# Patient Record
Sex: MALE | Race: WHITE | NOT HISPANIC OR LATINO | Employment: OTHER | URBAN - METROPOLITAN AREA
[De-identification: names, ages, dates, MRNs, and addresses within clinical notes are randomized per-mention and may not be internally consistent; named-entity substitution may affect disease eponyms.]

---

## 2017-08-11 RX ORDER — ACETAMINOPHEN 500 MG
500-1000 TABLET ORAL EVERY 6 HOURS PRN
COMMUNITY
End: 2021-01-15

## 2017-08-11 RX ORDER — MV-MINS/FOLIC/LYCOPENE/GINKGO 400-300MCG
1 TABLET ORAL DAILY
COMMUNITY
End: 2021-01-15

## 2017-08-22 ASSESSMENT — PAIN SCALES - GENERAL: PAINLEVEL_OUTOF10: ASSUMED PAIN PRESENT

## 2017-08-23 ENCOUNTER — HOSPITAL ENCOUNTER (INPATIENT)
Facility: MEDICAL CENTER | Age: 52
LOS: 2 days | DRG: 657 | End: 2017-08-25
Attending: UROLOGY | Admitting: UROLOGY
Payer: MEDICAID

## 2017-08-23 PROBLEM — C64.9 RENAL CARCINOMA (HCC): Status: ACTIVE | Noted: 2017-08-23

## 2017-08-23 LAB
ABO GROUP BLD: NORMAL
ABO GROUP BLD: NORMAL
BLD GP AB SCN SERPL QL: NORMAL
RH BLD: NORMAL

## 2017-08-23 PROCEDURE — 86850 RBC ANTIBODY SCREEN: CPT

## 2017-08-23 PROCEDURE — 160002 HCHG RECOVERY MINUTES (STAT): Performed by: UROLOGY

## 2017-08-23 PROCEDURE — 160036 HCHG PACU - EA ADDL 30 MINS PHASE I: Performed by: UROLOGY

## 2017-08-23 PROCEDURE — A9270 NON-COVERED ITEM OR SERVICE: HCPCS | Performed by: UROLOGY

## 2017-08-23 PROCEDURE — 160029 HCHG SURGERY MINUTES - 1ST 30 MINS LEVEL 4: Performed by: UROLOGY

## 2017-08-23 PROCEDURE — 88307 TISSUE EXAM BY PATHOLOGIST: CPT | Mod: 59

## 2017-08-23 PROCEDURE — 700102 HCHG RX REV CODE 250 W/ 637 OVERRIDE(OP): Performed by: UROLOGY

## 2017-08-23 PROCEDURE — A9270 NON-COVERED ITEM OR SERVICE: HCPCS

## 2017-08-23 PROCEDURE — 700102 HCHG RX REV CODE 250 W/ 637 OVERRIDE(OP)

## 2017-08-23 PROCEDURE — 700101 HCHG RX REV CODE 250: Performed by: UROLOGY

## 2017-08-23 PROCEDURE — 160009 HCHG ANES TIME/MIN: Performed by: UROLOGY

## 2017-08-23 PROCEDURE — 700111 HCHG RX REV CODE 636 W/ 250 OVERRIDE (IP)

## 2017-08-23 PROCEDURE — 700101 HCHG RX REV CODE 250

## 2017-08-23 PROCEDURE — 0TT00ZZ RESECTION OF RIGHT KIDNEY, OPEN APPROACH: ICD-10-PCS | Performed by: UROLOGY

## 2017-08-23 PROCEDURE — 160035 HCHG PACU - 1ST 60 MINS PHASE I: Performed by: UROLOGY

## 2017-08-23 PROCEDURE — 06C00ZZ EXTIRPATION OF MATTER FROM INFERIOR VENA CAVA, OPEN APPROACH: ICD-10-PCS | Performed by: UROLOGY

## 2017-08-23 PROCEDURE — 501838 HCHG SUTURE GENERAL: Performed by: UROLOGY

## 2017-08-23 PROCEDURE — 500698 HCHG HEMOCLIP, MEDIUM: Performed by: UROLOGY

## 2017-08-23 PROCEDURE — 0TB60ZZ EXCISION OF RIGHT URETER, OPEN APPROACH: ICD-10-PCS | Performed by: UROLOGY

## 2017-08-23 PROCEDURE — 700105 HCHG RX REV CODE 258: Performed by: UROLOGY

## 2017-08-23 PROCEDURE — 770006 HCHG ROOM/CARE - MED/SURG/GYN SEMI*

## 2017-08-23 PROCEDURE — 501498 HCHG SURG-I-LOOP, MAXI (BLUE): Performed by: UROLOGY

## 2017-08-23 PROCEDURE — 500697 HCHG HEMOCLIP, LARGE (ORANGE): Performed by: UROLOGY

## 2017-08-23 PROCEDURE — 0GB30ZZ EXCISION OF RIGHT ADRENAL GLAND, OPEN APPROACH: ICD-10-PCS | Performed by: UROLOGY

## 2017-08-23 PROCEDURE — 160041 HCHG SURGERY MINUTES - EA ADDL 1 MIN LEVEL 4: Performed by: UROLOGY

## 2017-08-23 PROCEDURE — 86901 BLOOD TYPING SEROLOGIC RH(D): CPT

## 2017-08-23 PROCEDURE — 700111 HCHG RX REV CODE 636 W/ 250 OVERRIDE (IP): Performed by: UROLOGY

## 2017-08-23 PROCEDURE — 07BD0ZZ EXCISION OF AORTIC LYMPHATIC, OPEN APPROACH: ICD-10-PCS | Performed by: UROLOGY

## 2017-08-23 PROCEDURE — 160048 HCHG OR STATISTICAL LEVEL 1-5: Performed by: UROLOGY

## 2017-08-23 PROCEDURE — 500122 HCHG BOVIE, BLADE: Performed by: UROLOGY

## 2017-08-23 PROCEDURE — A6402 STERILE GAUZE <= 16 SQ IN: HCPCS | Performed by: UROLOGY

## 2017-08-23 PROCEDURE — 502647 HCHG SEALANT-FIBRIN EVICEL 5 ML: Performed by: UROLOGY

## 2017-08-23 PROCEDURE — 501561 HCHG TRAY, EPIDURAL SINGLE SHOT: Performed by: UROLOGY

## 2017-08-23 PROCEDURE — 86900 BLOOD TYPING SEROLOGIC ABO: CPT

## 2017-08-23 PROCEDURE — 502704 HCHG DEVICE, LIGASURE IMPACT: Performed by: UROLOGY

## 2017-08-23 PROCEDURE — 501445 HCHG STAPLER, SKIN DISP: Performed by: UROLOGY

## 2017-08-23 RX ORDER — SCOLOPAMINE TRANSDERMAL SYSTEM 1 MG/1
1 PATCH, EXTENDED RELEASE TRANSDERMAL
Status: DISCONTINUED | OUTPATIENT
Start: 2017-08-23 | End: 2017-08-25 | Stop reason: HOSPADM

## 2017-08-23 RX ORDER — SODIUM CHLORIDE, SODIUM LACTATE, POTASSIUM CHLORIDE, CALCIUM CHLORIDE 600; 310; 30; 20 MG/100ML; MG/100ML; MG/100ML; MG/100ML
INJECTION, SOLUTION INTRAVENOUS CONTINUOUS
Status: DISCONTINUED | OUTPATIENT
Start: 2017-08-23 | End: 2017-08-25 | Stop reason: HOSPADM

## 2017-08-23 RX ORDER — DIPHENHYDRAMINE HYDROCHLORIDE 50 MG/ML
25 INJECTION INTRAMUSCULAR; INTRAVENOUS EVERY 6 HOURS PRN
Status: DISCONTINUED | OUTPATIENT
Start: 2017-08-23 | End: 2017-08-23

## 2017-08-23 RX ORDER — LIDOCAINE AND PRILOCAINE 25; 25 MG/G; MG/G
1 CREAM TOPICAL
Status: COMPLETED | OUTPATIENT
Start: 2017-08-23 | End: 2017-08-23

## 2017-08-23 RX ORDER — LIDOCAINE HYDROCHLORIDE 10 MG/ML
INJECTION, SOLUTION INFILTRATION; PERINEURAL
Status: COMPLETED
Start: 2017-08-23 | End: 2017-08-23

## 2017-08-23 RX ORDER — DEXTROSE MONOHYDRATE, SODIUM CHLORIDE, AND POTASSIUM CHLORIDE 50; 1.49; 4.5 G/1000ML; G/1000ML; G/1000ML
INJECTION, SOLUTION INTRAVENOUS CONTINUOUS
Status: DISCONTINUED | OUTPATIENT
Start: 2017-08-23 | End: 2017-08-25 | Stop reason: HOSPADM

## 2017-08-23 RX ORDER — DIPHENHYDRAMINE HCL 25 MG
25 TABLET ORAL NIGHTLY PRN
Status: DISCONTINUED | OUTPATIENT
Start: 2017-08-23 | End: 2017-08-25 | Stop reason: HOSPADM

## 2017-08-23 RX ORDER — NALOXONE HYDROCHLORIDE 0.4 MG/ML
0.1 INJECTION, SOLUTION INTRAMUSCULAR; INTRAVENOUS; SUBCUTANEOUS
Status: DISCONTINUED | OUTPATIENT
Start: 2017-08-23 | End: 2017-08-25 | Stop reason: HOSPADM

## 2017-08-23 RX ORDER — LIDOCAINE HYDROCHLORIDE 10 MG/ML
0.5 INJECTION, SOLUTION INFILTRATION; PERINEURAL
Status: COMPLETED | OUTPATIENT
Start: 2017-08-23 | End: 2017-08-23

## 2017-08-23 RX ORDER — ACETAMINOPHEN 500 MG
TABLET ORAL
Status: DISPENSED
Start: 2017-08-23 | End: 2017-08-23

## 2017-08-23 RX ORDER — FAMOTIDINE 20 MG/1
20 TABLET, FILM COATED ORAL EVERY 12 HOURS
Status: DISCONTINUED | OUTPATIENT
Start: 2017-08-23 | End: 2017-08-25 | Stop reason: HOSPADM

## 2017-08-23 RX ORDER — DEXAMETHASONE SODIUM PHOSPHATE 4 MG/ML
4 INJECTION, SOLUTION INTRA-ARTICULAR; INTRALESIONAL; INTRAMUSCULAR; INTRAVENOUS; SOFT TISSUE
Status: DISCONTINUED | OUTPATIENT
Start: 2017-08-23 | End: 2017-08-25 | Stop reason: HOSPADM

## 2017-08-23 RX ORDER — ONDANSETRON 2 MG/ML
4 INJECTION INTRAMUSCULAR; INTRAVENOUS EVERY 4 HOURS PRN
Status: DISCONTINUED | OUTPATIENT
Start: 2017-08-23 | End: 2017-08-23

## 2017-08-23 RX ORDER — ONDANSETRON 2 MG/ML
4 INJECTION INTRAMUSCULAR; INTRAVENOUS EVERY 6 HOURS PRN
Status: DISCONTINUED | OUTPATIENT
Start: 2017-08-23 | End: 2017-08-25 | Stop reason: HOSPADM

## 2017-08-23 RX ORDER — HALOPERIDOL 5 MG/ML
1 INJECTION INTRAMUSCULAR EVERY 6 HOURS PRN
Status: DISCONTINUED | OUTPATIENT
Start: 2017-08-23 | End: 2017-08-25 | Stop reason: HOSPADM

## 2017-08-23 RX ORDER — OXYCODONE HCL 10 MG/1
TABLET, FILM COATED, EXTENDED RELEASE ORAL
Status: DISPENSED
Start: 2017-08-23 | End: 2017-08-23

## 2017-08-23 RX ORDER — GABAPENTIN 300 MG/1
CAPSULE ORAL
Status: DISPENSED
Start: 2017-08-23 | End: 2017-08-23

## 2017-08-23 RX ORDER — AMOXICILLIN 250 MG
2 CAPSULE ORAL
Status: DISCONTINUED | OUTPATIENT
Start: 2017-08-23 | End: 2017-08-25 | Stop reason: HOSPADM

## 2017-08-23 RX ADMIN — POTASSIUM CHLORIDE, DEXTROSE MONOHYDRATE AND SODIUM CHLORIDE: 150; 5; 450 INJECTION, SOLUTION INTRAVENOUS at 17:10

## 2017-08-23 RX ADMIN — CEFAZOLIN SODIUM 1 G: 1 INJECTION, SOLUTION INTRAVENOUS at 21:32

## 2017-08-23 RX ADMIN — CEFAZOLIN SODIUM 1 G: 1 INJECTION, SOLUTION INTRAVENOUS at 17:41

## 2017-08-23 RX ADMIN — FAMOTIDINE 20 MG: 20 TABLET, FILM COATED ORAL at 21:32

## 2017-08-23 RX ADMIN — LIDOCAINE HYDROCHLORIDE 0.5 ML: 10 INJECTION, SOLUTION INFILTRATION; PERINEURAL at 06:30

## 2017-08-23 RX ADMIN — SODIUM CHLORIDE, SODIUM LACTATE, POTASSIUM CHLORIDE, CALCIUM CHLORIDE: 600; 310; 30; 20 INJECTION, SOLUTION INTRAVENOUS at 06:30

## 2017-08-23 ASSESSMENT — LIFESTYLE VARIABLES
HAVE PEOPLE ANNOYED YOU BY CRITICIZING YOUR DRINKING: NO
EVER_SMOKED: YES
ON A TYPICAL DAY WHEN YOU DRINK ALCOHOL HOW MANY DRINKS DO YOU HAVE: 2
EVER HAD A DRINK FIRST THING IN THE MORNING TO STEADY YOUR NERVES TO GET RID OF A HANGOVER: NO
HOW MANY TIMES IN THE PAST YEAR HAVE YOU HAD 5 OR MORE DRINKS IN A DAY: 2
TOTAL SCORE: 1
TOTAL SCORE: 1
ALCOHOL_USE: YES
EVER FELT BAD OR GUILTY ABOUT YOUR DRINKING: NO
HAVE YOU EVER FELT YOU SHOULD CUT DOWN ON YOUR DRINKING: YES
CONSUMPTION TOTAL: POSITIVE
AVERAGE NUMBER OF DAYS PER WEEK YOU HAVE A DRINK CONTAINING ALCOHOL: 2
TOTAL SCORE: 1

## 2017-08-23 ASSESSMENT — PATIENT HEALTH QUESTIONNAIRE - PHQ9
SUM OF ALL RESPONSES TO PHQ9 QUESTIONS 1 AND 2: 0
SUM OF ALL RESPONSES TO PHQ QUESTIONS 1-9: 0
2. FEELING DOWN, DEPRESSED, IRRITABLE, OR HOPELESS: NOT AT ALL
1. LITTLE INTEREST OR PLEASURE IN DOING THINGS: NOT AT ALL

## 2017-08-23 ASSESSMENT — PAIN SCALES - GENERAL
PAINLEVEL_OUTOF10: 2
PAINLEVEL_OUTOF10: 0
PAINLEVEL_OUTOF10: 2
PAINLEVEL_OUTOF10: 0
PAINLEVEL_OUTOF10: 2
PAINLEVEL_OUTOF10: 3
PAINLEVEL_OUTOF10: 0
PAINLEVEL_OUTOF10: 3
PAINLEVEL_OUTOF10: 3
PAINLEVEL_OUTOF10: 2
PAINLEVEL_OUTOF10: 0
PAINLEVEL_OUTOF10: 2
PAINLEVEL_OUTOF10: 0
PAINLEVEL_OUTOF10: 2
PAINLEVEL_OUTOF10: 2
PAINLEVEL_OUTOF10: 4
PAINLEVEL_OUTOF10: 0
PAINLEVEL_OUTOF10: 3
PAINLEVEL_OUTOF10: 0
PAINLEVEL_OUTOF10: 2
PAINLEVEL_OUTOF10: 3
PAINLEVEL_OUTOF10: 0
PAINLEVEL_OUTOF10: 2

## 2017-08-23 NOTE — OR SURGEON
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    Anesthesiologist/Type of Anesthesia:  Anesthesiologist: Alexandre Luna D.O./General    Surgical Staff:  Circulator: Elidia Child R.N.  Relief Circulator: Chung Lemons R.N.  Scrub Person: Bradford Clarke    Specimens:  Right kidney with surrounding lymph nodes    Estimated Blood Loss: Less than 100 mL    Findings: No gross adenopathy    Complications: None        8/23/2017 10:52 AM Héctor Mims

## 2017-08-23 NOTE — IP AVS SNAPSHOT
8/25/2017    Zachery Heath  [address]  [place] [address]    Dear Zachery:    [facility] wants to ensure your discharge home is safe and you or your loved ones have had all of your questions answered regarding your care after you leave the hospital.    Below is a list of resources and contact information should you have any questions regarding your hospital stay, follow-up instructions, or active medical symptoms.    Questions or Concerns Regarding… Contact   Medical Questions Related to Your Discharge  (7 days a week, 8am-5pm) Contact a Nurse Care Coordinator   [phone]   Medical Questions Not Related to Your Discharge  (24 hours a day / 7 days a week)  Contact the Nurse Health Line   [phone]    Medications or Discharge Instructions Refer to your discharge packet   or contact your [facility] Primary Care Provider   [phone]   Follow-up Appointment(s) Schedule your appointment via [employer]   or contact Scheduling [phone]   Billing Review your statement via [employer]  or contact Billing [phone]   Medical Records Review your records via [employer]   or contact Medical Records [phone]     You may receive a telephone call within two days of discharge. This call is to make certain you understand your discharge instructions and have the opportunity to have any questions answered. You can also easily access your medical information, test results and upcoming appointments via the [employer] free online health management tool. You can learn more and sign up at [employer]. For assistance setting up your [employer] account, please call [phone].    Once again, we want to ensure your discharge home is safe and that you have a clear understanding of any next steps in your care. If you have any questions or concerns, please do not hesitate to contact us, we are here for you. Thank you for choosing [facility] for your healthcare needs.    Sincerely,    Your [facility] Healthcare Team

## 2017-08-23 NOTE — IP AVS SNAPSHOT
Hallspot Access Code: Activation code not generated  Current Hallspot Status: Patient Declined    Your email address is not on file at [facility].  Email Addresses are required for you to sign up for Hallspot, please contact [phone] to verify your personal information and to provide your email address prior to attempting to register for Hallspot.    Zachery Heath  [address]  [place] [address]    path intelligencet  A secure, online tool to manage your health information     [facility]’s Hallspot® is a secure, online tool that connects you to your personalized health information from the privacy of your home -- day or night - making it very easy for you to manage your healthcare. Once the activation process is completed, you can even access your medical information using the Hallspot machelle, which is available for free in the Apple Machelle store or Google Play store.     To learn more about Hallspot, visit [email or web address]    There are two levels of access available (as shown below):   My Chart Features  [facility] Primary Care Doctor [facility]  Specialists [facility]  Urgent  Care Non-[facility] Primary Care Doctor   Email your healthcare team securely and privately 24/7 X X X    Manage appointments: schedule your next appointment; view details of past/upcoming appointments X      Request prescription refills. X      View recent personal medical records, including lab and immunizations X X X X   View health record, including health history, allergies, medications X X X X   Read reports about your outpatient visits, procedures, consult and ER notes X X X X   See your discharge summary, which is a recap of your hospital and/or ER visit that includes your diagnosis, lab results, and care plan X X  X     How to register for path intelligencet:  Once your e-mail address has been verified, follow the following steps to sign up for path intelligencet.     1. Go to  [email or web address]  2. Click on the Sign Up Now box, which takes you to the New  Member Sign Up page. You will need to provide the following information:  a. Enter your [employer] Access Code exactly as it appears at the top of this page. (You will not need to use this code after you’ve completed the sign-up process. If you do not sign up before the expiration date, you must request a new code.)   b. Enter your date of birth.   c. Enter your home email address.   d. Click Submit, and follow the next screen’s instructions.  3. Create a [email or web address] ID. This will be your DefenCall login ID and cannot be changed, so think of one that is secure and easy to remember.  4. Create a DefenCall password. You can change your password at any time.  5. Enter your Password Reset Question and Answer. This can be used at a later time if you forget your password.   6. Enter your e-mail address. This allows you to receive e-mail notifications when new information is available in DefenCall.  7. Click Sign Up. You can now view your health information.    For assistance activating your [employer] account, call [phone]

## 2017-08-23 NOTE — OR NURSING
Pt A&O. Resting comfortably in gurney, eating ice chips. Pt reports mild pain that is tolerable with epidural infusion. Good strength in all extremities, however pt reports numbness from T4-L5. VSS.   Report called to PACU II and pt transferred. Pt's brother notified of transfer.

## 2017-08-23 NOTE — H&P
DATE OF SERVICE:  08/23/2017    REASON FOR ADMISSION:  Right radical nephrectomy.    HISTORY OF PRESENT ILLNESS:  This 52-year-old male has recently been diagnosed   with a large right-sided renal mass.  The mass is a solid and enhancing mass   presumed to be a renal cell carcinoma.  He has been evaluated with CT scan and   with MRI.  The CT scan showed a large right inferior to lower pole renal mass   with what appeared to be a thrombus in the renal vein.  There was question of   extension into the inferior vena cava.  This was subsequently pursued with an   MRI of the abdomen.  That was completed at [facility].  The   MRI confirmed a solid enhancing mass suspicious for renal cell carcinoma.  The   renal vein did appear to be involved and there appeared to be tumor extending   just into the inferior vena cava.    He was initially evaluated for hematuria which prompted the imaging.    PAST MEDICAL HISTORY:  No significant illnesses.    PAST SURGICAL HISTORY:  Denies.    MEDICATIONS:  None.    ALLERGIES:  None.    FAMILY HISTORY:  Diabetes mellitus, coronary artery disease, hypertension,   leukemia.    SOCIAL HISTORY:  He is , not currently employed.  Drinks alcohol   socially.  He smoked 2 packs per day for 35 years, but has quit recently.    PHYSICAL EXAMINATION:     GENERAL:  Revealed a pleasant, cooperative male in no acute distress.  LUNGS:  Clear.  CARDIAC:  Regular rate and rhythm.  ABDOMEN:  Soft, nontender.  There is a nontender mass in the right upper   quadrant.  GENITOURINARY:  Reveals no inguinal abnormalities.  There is a right-sided   hydrocele with moderate tenderness.    LABORATORY DATA:  From 814 at [facility] showed a potassium of   3.8, normal electrolytes, normal liver function tests.  Creatinine is 1.16,   hemoglobin 15.9, hematocrit 46.9, white blood cell count 8.0, platelet count   380.  Urinalysis with no sign of infection.  Other data includes a CT scan  and   MRI as above.  Chest x-ray from 07/25/2017 at [facility]   showed no significant abnormalities.  The heart was not enlarged.  There were   no effusions or signs of pulmonary masses.    IMPRESSION:  1.  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  2.  No significant other medical illnesses.  3.  Significant tobacco abuse history in the past.    PLAN:  The patient is admitted at this time for an open right radical   nephrectomy with vena caval tumor thrombectomy.  The indications, risks,   benefits, and alternatives have been discussed with the patient.  He would   like to proceed.  We will do so at his request at this time.       ____________________________________     MD BENI INFANTE / KENISHA    DD:  08/22/2017 21:12:21  DT:  08/22/2017 21:38:27    D#:  [id number]  Job#:  [id number]

## 2017-08-23 NOTE — OR NURSING
Pt incontinent of large amount of mucous, bloody stool. Dr. Mims paged and notified. Order received to continue monitoring. Will notify receiving RN once pt is transferred.

## 2017-08-23 NOTE — OP REPORT
Operative Report    Date: 8/23/2017    Surgeon: Héctor Mims    Assistant: Castro Barron    Anesthesiologist: Alexandre Luna    Pre-operative Diagnosis: Right renal mass with inferior vena cava tumor thrombus    Post-operative Diagnosis: Same    Procedure: Right radical nephrectomy (subcostal) with vena caval tumor thrombus    Indications:   Presumed right renal cell carcinoma    Findings: Right renal mass with vena caval tumor thrombus, no gross adenopathy    Procedure in detail:    The patient was taken to the operative suite. Epidural catheter was placed. Gen. anesthesia was then administered and the patient placed in a modified right flank position with the right arm supported on an airplane arm board bumps under the right scapula and pressure points padded. Cefzil and was given intravenously. Correct patient and site of surgery was confirmed. A 16 Tamazight urethral Romeo catheter was placed to gravity drainage.    After the patient was shaved and then sterilely prepped and draped a right subcostal incision was performed extending for a short distance as a chevron below the left costal margin. Subcutaneous tissues were divided. The external oblique fascia was divided and the muscle layers divided down to the peritoneum. Peritoneum was opened throughout the length of the incision. Dissection was initiated by mobilizing the right colon and duodenum. The vena cava was exposed. There was significant reaction around the ostium of the right renal vein, junction with the vena cava.    I elected to dissect inferiorly initially freeing the anterior lateral surface of the vena cava down to the origin of the right common iliac vein. Next I gain some mobility above the renal vein on the lateral surface of the vena cava.    With this mobility I was able to dissect along the lateral surface of the vena cava below the renal vein and identify the renal arteries. A lower pole renal artery was 1st encountered and this was  ligated with 0 silk ligature and divided. Further dissection cephalad was difficult so I mobilized and the inferior aspect of his fascia. I divided the gonadal vein between hemoclips. I divided the ureter between Hem-o-nir clips and then the inferior aspect of shortness fascia was divided with the LigaSure device. I carried this dissection along the lateral sidewall using the LigaSure which freed up the lower pole and lateral aspect of the kidney. This allowed me to retract the kidney more anteriorly exposing the posterior renal hilum. A 2nd renal artery was identified and similarly ligated with 0 silk ligature. I was unable to freely mobilize the further superolateral attachments of the kidney and posterior filmy attachments using the LigaSure device.    With the mobility of the kidney established I was unable to retract inferolaterally and exposed the lateral surface of the vena cava above the renal vein. The adrenal vein was identified and ligated with a 3-0 silk ligature and clipped as well. This was then divided. Further fibroareolar attachments to the lateral and posterolateral aspect of the vena cava were taken between hemoclips.    This left the specimen attached only by the renal vein and some attachments posterior to the renal vein. Posteriorattachments were taken down between Hem-o-nir clips.    I was then able to retract the kidney laterally. The tumor vein thrombus within the vena cava was palpable. I was able to place a Satinsky clamp around the origin of the renal vein completely enclosing the tumor thrombus. I then incised the renal vein/IVC junction with Carmichael scissors. The tumor thrombus was brought out through this incision and I was able to then incise the posterior wall of the renal vein junction with the vena cava freeing the specimen with the tumor thrombus intact.    The vena cava was inspected. There was no residual thrombus. This was a free-floating thrombus.    The vena cava was then  repaired using a running 4-0 Prolene suture. Satinsky clamp was removed. There was no bleeding from the vena caval repair.    The retroperitoneum was liberally irrigated with saline. Small sites of bleeding were either coagulated or controlled with hemoclips. AdvaSeal was then placed over the area of the lymph node dissection along the lateral surface of the vena cava and the vena caval repair.    Pre-caval lymph nodes were removed and submitted as a separate specimen.    Bowels were laid back into its anatomic position. The greater omentum was draped over this. Sponge instrument and needle counts were reported as correct. The peritoneum was closed with a running 0 Vicryl suture and fascial layers were repaired with interrupted #1 Vicryl figure-of-eight sutures. Subcutaneous tissues were irrigated. Skin was closed with staples. No intraperitoneal or retroperitoneal drains were left.     EBL: Less than 100 mL    UOP: 200 mL    Drains: Romeo catheter    Dispo: Stable to postanesthesia care unit

## 2017-08-23 NOTE — H&P
DATE OF SERVICE:  08/23/2017    REASON FOR ADMISSION:  Right radical nephrectomy.    HISTORY OF PRESENT ILLNESS:  This 52-year-old male has recently been diagnosed   with a large right-sided renal mass.  The mass is a solid and enhancing mass   presumed to be a renal cell carcinoma.  He has been evaluated with CT scan and   with MRI.  The CT scan showed a large right inferior to lower pole renal mass   with what appeared to be a thrombus in the renal vein.  There was question of   extension into the inferior vena cava.  This was subsequently pursued with an   MRI of the abdomen.  That was completed at [facility].  The   MRI confirmed a solid enhancing mass suspicious for renal cell carcinoma.  The   renal vein did appear to be involved and there appeared to be tumor extending   just into the inferior vena cava.    He was initially evaluated for hematuria which prompted the imaging.    PAST MEDICAL HISTORY:  No significant illnesses.    PAST SURGICAL HISTORY:  Denies.    MEDICATIONS:  None.    ALLERGIES:  None.    FAMILY HISTORY:  Diabetes mellitus, coronary artery disease, hypertension,   leukemia.    SOCIAL HISTORY:  He is , not currently employed.  Drinks alcohol   socially.  He smoked 2 packs per day for 35 years, but has quit recently.    PHYSICAL EXAMINATION:     GENERAL:  Revealed a pleasant, cooperative male in no acute distress.  LUNGS:  Clear.  CARDIAC:  Regular rate and rhythm.  ABDOMEN:  Soft, nontender.  There is a nontender mass in the right upper   quadrant.  GENITOURINARY:  Reveals no inguinal abnormalities.  There is a right-sided   hydrocele with moderate tenderness.    LABORATORY DATA:  From 814 at [facility] showed a potassium of   3.8, normal electrolytes, normal liver function tests.  Creatinine is 1.16,   hemoglobin 15.9, hematocrit 46.9, white blood cell count 8.0, platelet count   380.  Urinalysis with no sign of infection.  Other data includes a CT scan  and   MRI as above.  Chest x-ray from 07/25/2017 at [facility]   showed no significant abnormalities.  The heart was not enlarged.  There were   no effusions or signs of pulmonary masses.    IMPRESSION:  1.  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  2.  No significant other medical illnesses.  3.  Significant tobacco abuse history in the past.    PLAN:  The patient is admitted at this time for an open right radical   nephrectomy with vena caval tumor thrombectomy.  The indications, risks,   benefits, and alternatives have been discussed with the patient.  He would   like to proceed.  We will do so at his request at this time.       ____________________________________     MD BENI INFANTE / KENISHA    DD:  08/22/2017 21:12:21  DT:  08/22/2017 21:38:27    D#:  [id number]  Job#:  [id number]

## 2017-08-23 NOTE — IP AVS SNAPSHOT
" Home Care Instructions                                                                                                                  Name:Zachery Heath  Medical Record Number:[id number]  CSN: [id number]    YOB: 1965   Age: 52 y.o.  Sex: male  HT:1.727 m (5' 8\") WT: 64.3 kg (141 lb 12.1 oz)          Admit Date: 8/23/2017     Discharge Date:   Today's Date: 8/25/2017  Attending Doctor:  Héctor Mims M.D.                  Allergies:  Peanut-derived            Discharge Instructions       Discharge Instructions    Discharged to home by car with relative. Discharged via walking, hospital escort: Refused.  Special equipment needed: Not Applicable    Be sure to schedule a follow-up appointment with your primary care doctor or any specialists as instructed.     Discharge Plan:   Diet Plan: Discussed  Activity Level: Discussed  Smoking Cessation Offered: Patient Counseled  Confirmed Follow up Appointment: Patient to Call and Schedule Appointment  Confirmed Symptoms Management: Discussed  Medication Reconciliation Updated: Yes  Influenza Vaccine Indication: Patient Refuses    I understand that a diet low in cholesterol, fat, and sodium is recommended for good health. Unless I have been given specific instructions below for another diet, I accept this instruction as my diet prescription.   Other diet: Regular    Special Instructions: None    · Is patient discharged on Warfarin / Coumadin?   No     · Is patient Post Blood Transfusion?  No    Open Nephrectomy, Care After  Refer to this sheet in the next few weeks. These instructions provide you with information abput caring for yourself after your procedure. Your health care provider may also give you more specific instructions. Your treatment has been planned according to current medical practices, but problems sometimes occur. Call your health care provider if you have any problems or questions after your procedure.  WHAT TO EXPECT AFTER THE " PROCEDURE  After your procedure, it is common to have:  · Pain in your side and belly (abdomen). Pain may be worse when you cough or breathe deeply.  · Sore and weak muscles. It may be difficult to get up if you are sitting or lying down.  · Trouble finding a comfortable sleeping position.  · Numbness in the area around your incision.  HOME CARE INSTRUCTIONS  · Take medicines only as directed by your health care provider.  · Do not drive or operate heavy machinery while taking pain medicine.  · Do not take baths, swim, or use a hot tub until your health care provider approves.  · There are many different ways to close and cover an incision, including stitches (sutures), skin glue, and adhesive strips. Follow your health care provider's instructions about:  ¨ Incision care.  ¨ Bandage (dressing) changes and removal.  ¨ Incision closure removal.  · Check your incision every day for signs of infection. Watch for:  ¨ Redness, swelling, or pain.  ¨ Fluid, blood, or pus.  · It may help to:  ¨ Hold a pillow over your belly if you need to cough.  ¨ Sleep in a chair until your muscles are stronger and you are less sore.  · Do your deep breathing exercises as directed by your health care provider.  · Try to walk a little bit every day as directed by your health care provider. Avoid any heavy activity. Ask your health care provider when you can return to your normal activities, such as work.  · Do not lift anything that is heavier than a gallon of milk until your health care provider approves.  · Keep all follow-up visits as directed by your health care provider. This is important.  SEEK MEDICAL CARE IF:  · You have a fever.  · Your pain is not controlled with medicine.  · You have a cough.  · You are short of breath.  · You vomit.  · You have constipation or diarrhea.  SEEK IMMEDIATE MEDICAL CARE IF:  · You have severe pain.  · You have chest pain.  · You have difficulty breathing.  · You have redness, swelling, or pain in  your incision area.  · You have fluid, blood, or pus coming from your incision.  · You are unable to urinate.     This information is not intended to replace advice given to you by your health care provider. Make sure you discuss any questions you have with your health care provider.     Document Released: 07/07/2006 Document Revised: 01/08/2016 Document Reviewed: 08/26/2015  [employer] Interactive Patient Education ©2016 [employer]      Depression / Suicide Risk    As you are discharged from this [facility] facility, it is important to learn how to keep safe from harming yourself.    Recognize the warning signs:  · Abrupt changes in personality, positive or negative- including increase in energy   · Giving away possessions  · Change in eating patterns- significant weight changes-  positive or negative  · Change in sleeping patterns- unable to sleep or sleeping all the time   · Unwillingness or inability to communicate  · Depression  · Unusual sadness, discouragement and loneliness  · Talk of wanting to die  · Neglect of personal appearance   · Rebelliousness- reckless behavior  · Withdrawal from people/activities they love  · Confusion- inability to concentrate     If you or a loved one observes any of these behaviors or has concerns about self-harm, here's what you can do:  · Talk about it- your feelings and reasons for harming yourself  · Remove any means that you might use to hurt yourself (examples: pills, rope, extension cords, firearm)  · Get professional help from the community (Mental Health, Substance Abuse, psychological counseling)  · Do not be alone:Call your Safe Contact- someone whom you trust who will be there for you.  · Call your local CRISIS HOTLINE 014-6932 or 403-119-7986  · Call your local Children's Mobile Crisis Response Team [place] (198) 712-4502 or www.AVdirect  · Call the toll free National Suicide Prevention Hotlines   · National Suicide Prevention Lifeline 248-161-DPAC  (6347)  · [facility] 800-SUICIDE (539-2111)        Follow-up Information     1. Follow up with Héctor Mims M.D.. Go in 1 week.    Specialty:  Urology    Why:  For wound re-check    Contact information    [address]  [place] [id number] [phone]           Discharge Medication Instructions:    Below are the medications your physician expects you to take upon discharge:    Review all your home medications and newly ordered medications with your doctor and/or pharmacist. Follow medication instructions as directed by your doctor and/or pharmacist.    Please keep your medication list with you and share with your physician.               Medication List      START taking these medications        Instructions    Morning Afternoon Evening Bedtime    docusate sodium 100 MG Caps   Commonly known as:  COLACE        Take 1 Cap by mouth 2 times a day.   Dose:  100 mg                        oxycodone-acetaminophen  MG Tabs   Last time this was given:  1 Tab on 8/25/2017 11:40 AM   Commonly known as:  PERCOCET-10        Take 1 Tab by mouth every four hours as needed for Moderate Pain.   Dose:  1 Tab                          CONTINUE taking these medications        Instructions    Morning Afternoon Evening Bedtime    acetaminophen 500 MG Tabs   Commonly known as:  TYLENOL        Take 500-1,000 mg by mouth every 6 hours as needed.   Dose:  500-1000 mg                        ALBUTEROL INH        Inhale 1 Puff by mouth as needed.   Dose:  1 Puff                        ONE-A-DAY MENS 50+ ADVANTAGE Tabs        Take 1 Tab by mouth every day.   Dose:  1 Tab                          STOP taking these medications     CIPRO PO                    Where to Get Your Medications      Information about where to get these medications is not yet available     ! Ask your nurse or doctor about these medications    - docusate sodium 100 MG Caps  - oxycodone-acetaminophen  MG Tabs            Instructions            Diet / Nutrition:    Follow any diet instructions given to you by your doctor or the dietician, including how much salt (sodium) you are allowed each day.    If you are overweight, talk to your doctor about a weight reduction plan.    Activity:    Remain physically active following your doctor's instructions about exercise and activity.    Rest often.     Any time you become even a little tired or short of breath, SIT DOWN and rest.    Worsening Symptoms:    Report any of the following signs and symptoms to the doctor's office immediately:    *Pain of jaw, arm, or neck  *Chest pain not relieved by medication                               *Dizziness or loss of consciousness  *Difficulty breathing even when at rest   *More tired than usual                                       *Bleeding drainage or swelling of surgical site  *Swelling of feet, ankles, legs or stomach                 *Fever (>100ºF)  *Pink or blood tinged sputum  *Weight gain (3lbs/day or 5lbs /week)           *Shock from internal defibrillator (if applicable)  *Palpitations or irregular heartbeats                *Cool and/or numb extremities    Stroke Awareness    Common Risk Factors for Stroke include:    Age  Atrial Fibrillation  Carotid Artery Stenosis  Diabetes Mellitus  Excessive alcohol consumption  High blood pressure  Overweight   Physical inactivity  Smoking    Warning signs and symptoms of a stroke include:    *Sudden numbness or weakness of the face, arm or leg (especially on one side of the body).  *Sudden confusion, trouble speaking or understanding.  *Sudden trouble seeing in one or both eyes.  *Sudden trouble walking, dizziness, loss of balance or coordination.Sudden severe headache with no known cause.    It is very important to get treatment quickly when a stroke occurs. If you experience any of the above warning signs, call 911 immediately.                   Disclaimer         Quit Smoking / Tobacco Use:    I understand the use  of any tobacco products increases my chance of suffering from future heart disease or stroke and could cause other illnesses which may shorten my life. Quitting the use of tobacco products is the single most important thing I can do to improve my health. For further information on smoking / tobacco cessation call a Toll Free Quit Line at 1-254.923.5377 (*National Cancer [place]) or 1-328.485.7856 (American Lung Association) or you can access the web based program at www.lungusa.org.    [place] Tobacco Users Help Line:  (491) 963-1614       Toll Free: 1-423.643.8075  Quit Tobacco Program [facility] (735)612-1338    Crisis Hotline:    [place] Crisis Hotline:  2-587-YUTIKEE or 1-217.527.2686    [place] Crisis Hotline:    1-952.889.4737 or 830-790-9765    Discharge Survey:   Thank you for choosing [facility]. We hope we did everything we could to make your hospital stay a pleasant one. You may be receiving a phone survey and we would appreciate your time and participation in answering the questions. Your input is very valuable to us in our efforts to improve our service to our patients and their families.        My signature on this form indicates that:    1. I have reviewed and understand the above information.  2. My questions regarding this information have been answered to my satisfaction.  3. I have formulated a plan with my discharge nurse to obtain my prescribed medications for home.                  Disclaimer         __________________________________                     __________       ________                       Patient Signature                                                 Date                    Time

## 2017-08-23 NOTE — IP AVS SNAPSHOT
" <p align=\"LEFT\"><IMG SRC=\"[email or web address]\" alt=\"Image\" WIDTH=\"50%\" HEIGHT=\"200\" BORDER=\"\"></p>                   Name:Zachery Heath  Medical Record Number:[id number]  CSN: [id number]    YOB: 1965   Age: 52 y.o.  Sex: male  HT:1.727 m (5' 8\") WT: 64.3 kg (141 lb 12.1 oz)          Admit Date: 8/23/2017     Discharge Date:   Today's Date: 8/25/2017  Attending Doctor:  Héctor Mims M.D.                  Allergies:  Peanut-derived          Follow-up Information     1. Follow up with Héctor Mims M.D.. Go in 1 week.    Specialty:  Urology    Why:  For wound re-check    Contact information    [address]  [facility] [address] [phone]           Medication List      Take these Medications        Instructions    acetaminophen 500 MG Tabs   Commonly known as:  TYLENOL    Take 500-1,000 mg by mouth every 6 hours as needed.   Dose:  500-1000 mg       ALBUTEROL INH    Inhale 1 Puff by mouth as needed.   Dose:  1 Puff       docusate sodium 100 MG Caps   Commonly known as:  COLACE    Take 1 Cap by mouth 2 times a day.   Dose:  100 mg       ONE-A-DAY MENS 50+ ADVANTAGE Tabs    Take 1 Tab by mouth every day.   Dose:  1 Tab       oxycodone-acetaminophen  MG Tabs   Commonly known as:  PERCOCET-10    Take 1 Tab by mouth every four hours as needed for Moderate Pain.   Dose:  1 Tab         "

## 2017-08-24 LAB
ANION GAP SERPL CALC-SCNC: 6 MMOL/L (ref 0–11.9)
BUN SERPL-MCNC: 14 MG/DL (ref 8–22)
CALCIUM SERPL-MCNC: 8.5 MG/DL (ref 8.5–10.5)
CHLORIDE SERPL-SCNC: 102 MMOL/L (ref 96–112)
CO2 SERPL-SCNC: 26 MMOL/L (ref 20–33)
CREAT SERPL-MCNC: 1.26 MG/DL (ref 0.5–1.4)
ERYTHROCYTE [DISTWIDTH] IN BLOOD BY AUTOMATED COUNT: 49.9 FL (ref 35.9–50)
GFR SERPL CREATININE-BSD FRML MDRD: 60 ML/MIN/1.73 M 2
GLUCOSE SERPL-MCNC: 130 MG/DL (ref 65–99)
HCT VFR BLD AUTO: 36.2 % (ref 42–52)
HGB BLD-MCNC: 12.2 G/DL (ref 14–18)
MCH RBC QN AUTO: 32.2 PG (ref 27–33)
MCHC RBC AUTO-ENTMCNC: 33.7 G/DL (ref 33.7–35.3)
MCV RBC AUTO: 95.5 FL (ref 81.4–97.8)
PLATELET # BLD AUTO: 319 K/UL (ref 164–446)
PMV BLD AUTO: 8.8 FL (ref 9–12.9)
POTASSIUM SERPL-SCNC: 4.9 MMOL/L (ref 3.6–5.5)
RBC # BLD AUTO: 3.79 M/UL (ref 4.7–6.1)
SODIUM SERPL-SCNC: 134 MMOL/L (ref 135–145)
WBC # BLD AUTO: 10.2 K/UL (ref 4.8–10.8)

## 2017-08-24 PROCEDURE — 80048 BASIC METABOLIC PNL TOTAL CA: CPT

## 2017-08-24 PROCEDURE — 85027 COMPLETE CBC AUTOMATED: CPT

## 2017-08-24 PROCEDURE — 700102 HCHG RX REV CODE 250 W/ 637 OVERRIDE(OP): Performed by: NURSE PRACTITIONER

## 2017-08-24 PROCEDURE — 700111 HCHG RX REV CODE 636 W/ 250 OVERRIDE (IP): Performed by: NURSE PRACTITIONER

## 2017-08-24 PROCEDURE — A9270 NON-COVERED ITEM OR SERVICE: HCPCS | Performed by: NURSE PRACTITIONER

## 2017-08-24 PROCEDURE — 700102 HCHG RX REV CODE 250 W/ 637 OVERRIDE(OP): Performed by: UROLOGY

## 2017-08-24 PROCEDURE — 700101 HCHG RX REV CODE 250: Performed by: UROLOGY

## 2017-08-24 PROCEDURE — 770006 HCHG ROOM/CARE - MED/SURG/GYN SEMI*

## 2017-08-24 PROCEDURE — 36415 COLL VENOUS BLD VENIPUNCTURE: CPT

## 2017-08-24 PROCEDURE — A9270 NON-COVERED ITEM OR SERVICE: HCPCS | Performed by: UROLOGY

## 2017-08-24 RX ORDER — OXYCODONE AND ACETAMINOPHEN 10; 325 MG/1; MG/1
1 TABLET ORAL EVERY 4 HOURS PRN
Status: DISCONTINUED | OUTPATIENT
Start: 2017-08-24 | End: 2017-08-25 | Stop reason: HOSPADM

## 2017-08-24 RX ADMIN — ENOXAPARIN SODIUM 40 MG: 100 INJECTION SUBCUTANEOUS at 20:14

## 2017-08-24 RX ADMIN — POTASSIUM CHLORIDE, DEXTROSE MONOHYDRATE AND SODIUM CHLORIDE: 150; 5; 450 INJECTION, SOLUTION INTRAVENOUS at 17:55

## 2017-08-24 RX ADMIN — POTASSIUM CHLORIDE, DEXTROSE MONOHYDRATE AND SODIUM CHLORIDE: 150; 5; 450 INJECTION, SOLUTION INTRAVENOUS at 09:56

## 2017-08-24 RX ADMIN — FAMOTIDINE 20 MG: 20 TABLET, FILM COATED ORAL at 09:00

## 2017-08-24 RX ADMIN — FAMOTIDINE 20 MG: 20 TABLET, FILM COATED ORAL at 20:13

## 2017-08-24 RX ADMIN — POTASSIUM CHLORIDE, DEXTROSE MONOHYDRATE AND SODIUM CHLORIDE: 150; 5; 450 INJECTION, SOLUTION INTRAVENOUS at 05:05

## 2017-08-24 RX ADMIN — OXYCODONE HYDROCHLORIDE AND ACETAMINOPHEN 1 TABLET: 10; 325 TABLET ORAL at 11:35

## 2017-08-24 RX ADMIN — OXYCODONE HYDROCHLORIDE AND ACETAMINOPHEN 1 TABLET: 10; 325 TABLET ORAL at 15:33

## 2017-08-24 RX ADMIN — OXYCODONE HYDROCHLORIDE AND ACETAMINOPHEN 1 TABLET: 10; 325 TABLET ORAL at 20:13

## 2017-08-24 ASSESSMENT — PAIN SCALES - GENERAL
PAINLEVEL_OUTOF10: 6
PAINLEVEL_OUTOF10: ASSUMED PAIN PRESENT
PAINLEVEL_OUTOF10: 0
PAINLEVEL_OUTOF10: 6
PAINLEVEL_OUTOF10: 0
PAINLEVEL_OUTOF10: 4
PAINLEVEL_OUTOF10: ASSUMED PAIN PRESENT
PAINLEVEL_OUTOF10: ASSUMED PAIN PRESENT
PAINLEVEL_OUTOF10: 5

## 2017-08-24 NOTE — CARE PLAN
Problem: Communication  Goal: The ability to communicate needs accurately and effectively will improve  POC discussed with pt this shift.    Problem: Pain Management  Goal: Pain level will decrease to patient’s comfort goal  Pain assessed, PCEA utilized as needed, educated pt regarding use and s/e

## 2017-08-24 NOTE — PROGRESS NOTES
"Updated Shiloh Silver that patients epidural catheter was removed and asked for prn pain medication ordered.  Also updated ogden was removed prior to epidural removal, \"ok just bladder scan if trouble to void\".  Updated that lovenox was ok to give per Dr. Hager anesthesiology this evening.  States she will order later.   "

## 2017-08-24 NOTE — PROGRESS NOTES
Dr Hager at bedside, patient's epidural was removed.  Medication was removed from the PCEA pump and wasted with another RN.  Patient describes pain as 2/10 at this time, educated on notifying RN when pain begins to increase.  Continuous IV fluids were moved to IV in the patient's left hand to ease movement, and IV bag replaced. Patient's SCDs were placed back on his legs, CNA asked to find a SCD machine.  Encouraged patient to ambulate and sit up to the chair for all of his meals.  Bed is locked and in lowest position, call light is within reach.  All needs are met at this time.

## 2017-08-24 NOTE — DISCHARGE PLANNING
I received an order to discuss the patient's insurance and financial services with him.  I went to his room and talked with him.  He was concerned about his hospital bill.  I told him that Medicaid FFS will be billed and that he should not get an additional bill.  That seemed to be his biggest concern.  He also said that he filed for Social Security Disability about a year ago and still hasn't heard anything.  He has a social security  on the case.  I told him that it could take another year before he gets an answer but I encouraged him to make sure to get the information from this hospitalization to his .  He said he has already signed a Release of Information and included Social Security Disability.  I told him not to assume that Social Security Disability will ask for the information just because he has them listed, that his  needs to get the information sent to them.  He said he didn't know that and he will make sure that his  sends Social Security Disability all the information from this hospitalization.

## 2017-08-24 NOTE — DISCHARGE PLANNING
The patient told me that when he is discharged he will need a ride home through [facility], as he has Medicaid.  He lives in [place].

## 2017-08-24 NOTE — PROGRESS NOTES
Patient voided 125mL of leni urine.   Patient has been drinking water and walking miller.   PO pain control effective at this time.   IS max pull 1500

## 2017-08-24 NOTE — PROGRESS NOTES
"Urology Progress Note    Post op Day # 1. 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  No flatus. Nursing removed ogden catheter at 0530 this AM. Epidural was removed later in the morning. Still has not urinated. PVR <200cc. Ambulating. Pain controlled with oral medication.    O:   Blood pressure 123/76, pulse 87, temperature 35.9 °C (96.6 °F), resp. rate 17, height 1.727 m (5' 8\"), weight 64.3 kg (141 lb 12.1 oz), SpO2 91 %.  Recent Labs      08/24/17   0203   SODIUM  134*   POTASSIUM  4.9   CHLORIDE  102   CO2  26   GLUCOSE  130*   BUN  14   CREATININE  1.26   CALCIUM  8.5     Recent Labs      08/24/17   0203   WBC  10.2   RBC  3.79*   HEMOGLOBIN  12.2*   HEMATOCRIT  36.2*   MCV  95.5   MCH  32.2   MCHC  33.7   RDW  49.9   PLATELETCT  319   MPV  8.8*         Intake/Output Summary (Last 24 hours) at 08/24/17 1421  Last data filed at 08/24/17 1310   Gross per 24 hour   Intake   2030 ml   Output    875 ml   Net   1155 ml       Exam:  Abdomen soft, benign.  Incisions clean, dry, intact.         A/P:    Active Hospital Problems    Diagnosis   • Renal carcinoma (CMS-HCC) [C64.9]       Stable.   Ambulate, IS.  Labs in AM  Ok per anesthesiologist to start lovenox this pm  If unable to urinate place ogden catheter  "

## 2017-08-24 NOTE — PROGRESS NOTES
Received pt resting well in bed, stable with no apparent distress noted, no AMS/A&Ox4, VSS. Denied pain on initial encounter, PCA used as needed, educated pt regarding PCEA use and s/e and possibility of transitioning to oral pain meds sooner. Breathing even/unlabored, noted LCTAB, on RA/sats at low 90s, educated pt regarding IS use, able to pull 2000/denied SOB. Abdomen slightly tender s/p sx, (+)normoactiveBSx4, (-)N/V, per pt last BM was PTA/unable to pass flatus at this time. Dressing to sx incision C/D/I. PIV intact/patent x IVFat 125, epidural in place. Educated pt regarding early ambulation to increase GI motility. Romeo in-situ and draining to gravity. Care provided. Needs attended. No concerns at this time. Will continue with POC.

## 2017-08-24 NOTE — PROGRESS NOTES
Patient arrived to unit via gurney.  Slid over by self.     Epidural in place, scant bloody drainage around site.   Verified rate with Charge RN Yoselin.   Educated patient on epidural pain control.   Romeo in place per active order.   Tolerating clears at this time.   Dressing to abdomen with sang shadowing at top.   Cold pack to abdomen.   States pain controlled with epidural and cold pack, at 3 which is his comfort level.   IVF running per mar   in place per orders.- sat >90% on RA.   Looking for SCD and called for one.     Educated on phone, unit policies, call light, white board, call before fall, how to report concerns, non smoking policy, & plan of care.  Patient verbalizes understanding.   Call light and belongings within reach.   Brother at bedside, updated on plan of care.   All questions answered.   Phone at bedside, denies having home medications, denied safe keeping and password.

## 2017-08-24 NOTE — DISCHARGE PLANNING
Care Transition Team Assessment    Met with pt at bedside, pt states he will discharge home with no needs. Pt has family in area and drives.    Information Source  Orientation : Oriented x 4  Information Given By: Patient  Informant's Name: Zachery Heath  Who is responsible for making decisions for patient? : Patient    Readmission Evaluation  Is this a readmission?: No    Elopement Risk  Legal Hold: No  Ambulatory or Self Mobile in Wheelchair: Yes  Disoriented: No  Psychiatric Symptoms: None  History of Wandering: No  Elopement this Admit: No  Vocalizing Wanting to Leave: No  Displays Behaviors, Body Language Wanting to Leave: No-Not at Risk for Elopement  Elopement Risk: Not at Risk for Elopement    Interdisciplinary Discharge Planning  Does Admitting Nurse Feel This Could be a Complex Discharge?: No  Primary Care Physician: jenny  Lives with - Patient's Self Care Capacity: Alone and Able to Care For Self, Other (Comments) (family available)  Patient or legal guardian wants to designate a caregiver (see row info): No  Support Systems: Family Member(s)  Housing / Facility: Mobile Home, Other (Comments) (2 stairs)  Able to Return to Previous ADL's: No  Mobility Issues: No  Prior Services: None  Patient Expects to be Discharged to:: home  Assistance Needed: Yes  Durable Medical Equipment: Not Applicable    Discharge Preparedness  What is your plan after discharge?: Home with help  What are your discharge supports?: Parent  Prior Functional Level: Ambulatory, Drives Self, Independent with Activities of Daily Living, Independent with Medication Management  Difficulity with ADLs: None  Difficulity with IADLs: None    Functional Assesment  Prior Functional Level: Ambulatory, Drives Self, Independent with Activities of Daily Living, Independent with Medication Management    Finances  Financial Barriers to Discharge: No  Prescription Coverage: Yes ([facility])    Vision / Hearing Impairment  Vision  Impairment : Yes  Right Eye Vision: Wears Glasses  Left Eye Vision: Wears Glasses  Hearing Impairment : No    Values / Beliefs / Concerns  Values / Beliefs Concerns : No  Spiritual Requests During Hospitalization: No    Advance Directive  Advance Directive?: None  Advance Directive offered?: AD Booklet refused    Domestic Abuse  Have you ever been the victim of abuse or violence?: No  Physical Abuse or Sexual Abuse: No  Verbal Abuse or Emotional Abuse: No  Possible Abuse Reported to:: Not Applicable    Psychological Assessment  History of Substance Abuse: None  History of Psychiatric Problems: No  Non-compliant with Treatment: No  Newly Diagnosed Illness: No    Discharge Risks or Barriers  Discharge risks or barriers?: No    Anticipated Discharge Information  Anticipated discharge disposition: Home  Discharge Address: [address] [address]   Discharge Contact Phone Number: [phone]

## 2017-08-25 VITALS
WEIGHT: 141.76 LBS | DIASTOLIC BLOOD PRESSURE: 102 MMHG | HEART RATE: 70 BPM | BODY MASS INDEX: 21.48 KG/M2 | RESPIRATION RATE: 18 BRPM | SYSTOLIC BLOOD PRESSURE: 139 MMHG | OXYGEN SATURATION: 91 % | HEIGHT: 68 IN | TEMPERATURE: 97.6 F

## 2017-08-25 LAB
ALBUMIN SERPL BCP-MCNC: 3.4 G/DL (ref 3.2–4.9)
ALBUMIN/GLOB SERPL: 1.3 G/DL
ALP SERPL-CCNC: 69 U/L (ref 30–99)
ALT SERPL-CCNC: 19 U/L (ref 2–50)
ANION GAP SERPL CALC-SCNC: 7 MMOL/L (ref 0–11.9)
AST SERPL-CCNC: 46 U/L (ref 12–45)
BASOPHILS # BLD AUTO: 0.9 % (ref 0–1.8)
BASOPHILS # BLD: 0.05 K/UL (ref 0–0.12)
BILIRUB SERPL-MCNC: 0.6 MG/DL (ref 0.1–1.5)
BUN SERPL-MCNC: 11 MG/DL (ref 8–22)
CALCIUM SERPL-MCNC: 8.8 MG/DL (ref 8.5–10.5)
CHLORIDE SERPL-SCNC: 101 MMOL/L (ref 96–112)
CO2 SERPL-SCNC: 26 MMOL/L (ref 20–33)
CREAT SERPL-MCNC: 1.13 MG/DL (ref 0.5–1.4)
EOSINOPHIL # BLD AUTO: 0.16 K/UL (ref 0–0.51)
EOSINOPHIL NFR BLD: 2.7 % (ref 0–6.9)
ERYTHROCYTE [DISTWIDTH] IN BLOOD BY AUTOMATED COUNT: 48.1 FL (ref 35.9–50)
GFR SERPL CREATININE-BSD FRML MDRD: >60 ML/MIN/1.73 M 2
GLOBULIN SER CALC-MCNC: 2.6 G/DL (ref 1.9–3.5)
GLUCOSE SERPL-MCNC: 98 MG/DL (ref 65–99)
HCT VFR BLD AUTO: 38.3 % (ref 42–52)
HGB BLD-MCNC: 12.8 G/DL (ref 14–18)
IMM GRANULOCYTES # BLD AUTO: 0.01 K/UL (ref 0–0.11)
IMM GRANULOCYTES NFR BLD AUTO: 0.2 % (ref 0–0.9)
LYMPHOCYTES # BLD AUTO: 1.12 K/UL (ref 1–4.8)
LYMPHOCYTES NFR BLD: 19.1 % (ref 22–41)
MCH RBC QN AUTO: 31.4 PG (ref 27–33)
MCHC RBC AUTO-ENTMCNC: 33.4 G/DL (ref 33.7–35.3)
MCV RBC AUTO: 93.9 FL (ref 81.4–97.8)
MONOCYTES # BLD AUTO: 0.43 K/UL (ref 0–0.85)
MONOCYTES NFR BLD AUTO: 7.3 % (ref 0–13.4)
NEUTROPHILS # BLD AUTO: 4.09 K/UL (ref 1.82–7.42)
NEUTROPHILS NFR BLD: 69.8 % (ref 44–72)
NRBC # BLD AUTO: 0 K/UL
NRBC BLD AUTO-RTO: 0 /100 WBC
PLATELET # BLD AUTO: 289 K/UL (ref 164–446)
PMV BLD AUTO: 8.9 FL (ref 9–12.9)
POTASSIUM SERPL-SCNC: 4.6 MMOL/L (ref 3.6–5.5)
PROT SERPL-MCNC: 6 G/DL (ref 6–8.2)
RBC # BLD AUTO: 4.08 M/UL (ref 4.7–6.1)
SODIUM SERPL-SCNC: 134 MMOL/L (ref 135–145)
WBC # BLD AUTO: 5.9 K/UL (ref 4.8–10.8)

## 2017-08-25 PROCEDURE — 700101 HCHG RX REV CODE 250: Performed by: UROLOGY

## 2017-08-25 PROCEDURE — A9270 NON-COVERED ITEM OR SERVICE: HCPCS | Performed by: UROLOGY

## 2017-08-25 PROCEDURE — 85025 COMPLETE CBC W/AUTO DIFF WBC: CPT

## 2017-08-25 PROCEDURE — 80053 COMPREHEN METABOLIC PANEL: CPT

## 2017-08-25 PROCEDURE — A9270 NON-COVERED ITEM OR SERVICE: HCPCS | Performed by: NURSE PRACTITIONER

## 2017-08-25 PROCEDURE — 700102 HCHG RX REV CODE 250 W/ 637 OVERRIDE(OP): Performed by: NURSE PRACTITIONER

## 2017-08-25 PROCEDURE — 700102 HCHG RX REV CODE 250 W/ 637 OVERRIDE(OP): Performed by: UROLOGY

## 2017-08-25 PROCEDURE — 36415 COLL VENOUS BLD VENIPUNCTURE: CPT

## 2017-08-25 RX ORDER — OXYCODONE AND ACETAMINOPHEN 10; 325 MG/1; MG/1
1 TABLET ORAL EVERY 4 HOURS PRN
Qty: 20 TAB | Refills: 0 | Status: ON HOLD | OUTPATIENT
Start: 2017-08-25 | End: 2021-01-15

## 2017-08-25 RX ORDER — DOCUSATE SODIUM 100 MG/1
100 CAPSULE, LIQUID FILLED ORAL 2 TIMES DAILY
Qty: 60 CAP | Refills: 0 | Status: ON HOLD | OUTPATIENT
Start: 2017-08-25 | End: 2021-07-29

## 2017-08-25 RX ADMIN — FAMOTIDINE 20 MG: 20 TABLET, FILM COATED ORAL at 09:52

## 2017-08-25 RX ADMIN — OXYCODONE HYDROCHLORIDE AND ACETAMINOPHEN 1 TABLET: 10; 325 TABLET ORAL at 05:51

## 2017-08-25 RX ADMIN — OXYCODONE HYDROCHLORIDE AND ACETAMINOPHEN 1 TABLET: 10; 325 TABLET ORAL at 11:40

## 2017-08-25 RX ADMIN — POTASSIUM CHLORIDE, DEXTROSE MONOHYDRATE AND SODIUM CHLORIDE: 150; 5; 450 INJECTION, SOLUTION INTRAVENOUS at 05:50

## 2017-08-25 ASSESSMENT — PAIN SCALES - GENERAL
PAINLEVEL_OUTOF10: 3
PAINLEVEL_OUTOF10: ASSUMED PAIN PRESENT
PAINLEVEL_OUTOF10: 4

## 2017-08-25 NOTE — PROGRESS NOTES
Doing very well  +flatus, BM  No n/v.  Anxious to go home  DC home  Office to arrange f/u  Discharge summary dictated    Shiloh Silver APRN

## 2017-08-25 NOTE — DISCHARGE INSTRUCTIONS
Discharge Instructions    Discharged to home by car with relative. Discharged via walking, hospital escort: Refused.  Special equipment needed: Not Applicable    Be sure to schedule a follow-up appointment with your primary care doctor or any specialists as instructed.     Discharge Plan:   Diet Plan: Discussed  Activity Level: Discussed  Smoking Cessation Offered: Patient Counseled  Confirmed Follow up Appointment: Patient to Call and Schedule Appointment  Confirmed Symptoms Management: Discussed  Medication Reconciliation Updated: Yes  Influenza Vaccine Indication: Patient Refuses    I understand that a diet low in cholesterol, fat, and sodium is recommended for good health. Unless I have been given specific instructions below for another diet, I accept this instruction as my diet prescription.   Other diet: Regular    Special Instructions: None    · Is patient discharged on Warfarin / Coumadin?   No     · Is patient Post Blood Transfusion?  No    Open Nephrectomy, Care After  Refer to this sheet in the next few weeks. These instructions provide you with information abput caring for yourself after your procedure. Your health care provider may also give you more specific instructions. Your treatment has been planned according to current medical practices, but problems sometimes occur. Call your health care provider if you have any problems or questions after your procedure.  WHAT TO EXPECT AFTER THE PROCEDURE  After your procedure, it is common to have:  · Pain in your side and belly (abdomen). Pain may be worse when you cough or breathe deeply.  · Sore and weak muscles. It may be difficult to get up if you are sitting or lying down.  · Trouble finding a comfortable sleeping position.  · Numbness in the area around your incision.  HOME CARE INSTRUCTIONS  · Take medicines only as directed by your health care provider.  · Do not drive or operate heavy machinery while taking pain medicine.  · Do not take baths, swim,  or use a hot tub until your health care provider approves.  · There are many different ways to close and cover an incision, including stitches (sutures), skin glue, and adhesive strips. Follow your health care provider's instructions about:  ¨ Incision care.  ¨ Bandage (dressing) changes and removal.  ¨ Incision closure removal.  · Check your incision every day for signs of infection. Watch for:  ¨ Redness, swelling, or pain.  ¨ Fluid, blood, or pus.  · It may help to:  ¨ Hold a pillow over your belly if you need to cough.  ¨ Sleep in a chair until your muscles are stronger and you are less sore.  · Do your deep breathing exercises as directed by your health care provider.  · Try to walk a little bit every day as directed by your health care provider. Avoid any heavy activity. Ask your health care provider when you can return to your normal activities, such as work.  · Do not lift anything that is heavier than a gallon of milk until your health care provider approves.  · Keep all follow-up visits as directed by your health care provider. This is important.  SEEK MEDICAL CARE IF:  · You have a fever.  · Your pain is not controlled with medicine.  · You have a cough.  · You are short of breath.  · You vomit.  · You have constipation or diarrhea.  SEEK IMMEDIATE MEDICAL CARE IF:  · You have severe pain.  · You have chest pain.  · You have difficulty breathing.  · You have redness, swelling, or pain in your incision area.  · You have fluid, blood, or pus coming from your incision.  · You are unable to urinate.     This information is not intended to replace advice given to you by your health care provider. Make sure you discuss any questions you have with your health care provider.     Document Released: 07/07/2006 Document Revised: 01/08/2016 Document Reviewed: 08/26/2015  [employer] Interactive Patient Education ©2016 [employer]      Depression / Suicide Risk    As you are discharged from this [facility], [place],  it is important to learn how to keep safe from harming yourself.    Recognize the warning signs:  · Abrupt changes in personality, positive or negative- including increase in energy   · Giving away possessions  · Change in eating patterns- significant weight changes-  positive or negative  · Change in sleeping patterns- unable to sleep or sleeping all the time   · Unwillingness or inability to communicate  · Depression  · Unusual sadness, discouragement and loneliness  · Talk of wanting to die  · Neglect of personal appearance   · Rebelliousness- reckless behavior  · Withdrawal from people/activities they love  · Confusion- inability to concentrate     If you or a loved one observes any of these behaviors or has concerns about self-harm, here's what you can do:  · Talk about it- your feelings and reasons for harming yourself  · Remove any means that you might use to hurt yourself (examples: pills, rope, extension cords, firearm)  · Get professional help from the community (Mental Health, Substance Abuse, psychological counseling)  · Do not be alone:Call your Safe Contact- someone whom you trust who will be there for you.  · Call your local CRISIS HOTLINE 576-9974 or 923-394-7508  · Call your local Children's Mobile Crisis Response Team [place] (980) 857-6424 or www.vMobo  · Call the toll free National Suicide Prevention Hotlines   · National Suicide Prevention Lifeline 676-177-JZTQ (6230)  · National Hope Line Network 800-SUICIDE (819-1587)

## 2017-08-25 NOTE — PROGRESS NOTES
At baseline this shift, no apparent distress, no AMS/A&Ox4, VSS. C/o pain on initial encounter at 6/10, medicated per MAR. Breathing even/unlabored, noted dim lung sounds, on RA/denied SOB, reminded pt to use IS frequently, smoking cessation edu done. Abdomen tender s/p sx, (+)hyperBSx4, tolerating current diet well, per pt able to pass flatus, no BM yet at this time. Sx dressing in place, C/D/I. PIV intact/patent x IVF at 125, encouraged pt to increase clear fluid intake. Independent/ambulatory/upself/steady gait. Care provided. Needs attended. No concerns at this time.

## 2017-08-25 NOTE — PROGRESS NOTES
"Report received from NOC shift RN. Patient is A/O X 4 and on room air. Lung sounds diminished. VSS. /102 mmHg  Pulse 70  Temp(Src) 36.4 °C (97.6 °F)  Resp 18  Ht 1.727 m (5' 8\")  Wt 64.3 kg (141 lb 12.1 oz)  BMI 21.56 kg/m2  SpO2 91% Labs reviewed. BS normoactive X 4. Patient denies abdominal pain at this time. Lateral abdominal incision present. Dressing requires change. +BM. +void. Patient ambulating independently. Patient tolerating regular diet. No reports of N&V. PIV on right FA flushed and SL. Call light at bedside.     "

## 2017-08-25 NOTE — PROGRESS NOTES
Discussed discharge and follow up instructions with patient Patient given discharge scripts. PIVs and  ID band removed from patient. Patient was able to ambulate to vehicle.

## 2017-08-25 NOTE — CARE PLAN
Problem: Pain Management  Goal: Pain level will decrease to patient’s comfort goal  Outcome: PROGRESSING AS EXPECTED    08/25/17 0551 08/25/17 1002   OTHER   Nurse Pain Scale 0 - 10  4 --    Non Verbal Scale  --  Calm;Unlabored Breathing         Problem: Urinary Elimination:  Goal: Ability to reestablish a normal urinary elimination pattern will improve  Outcome: PROGRESSING AS EXPECTED  Patient voiding without issue. 350 mL at 0750.     Problem: Mobility  Goal: Risk for activity intolerance will decrease  Outcome: PROGRESSING AS EXPECTED  Intervention: Encourage patient to increase activity level in collaboration with Interdisciplinary Team  Patient ambulating independently and easily around unit ad deshawn.

## 2017-08-25 NOTE — CARE PLAN
Problem: Venous Thromboembolism (VTW)/Deep Vein Thrombosis (DVT) Prevention:  Goal: Patient will participate in Venous Thrombosis (VTE)/Deep Vein Thrombosis (DVT)Prevention Measures  DVT prophylaxis given, encouraged pt to ambulate frequently.    Problem: Pain Management  Goal: Pain level will decrease to patient’s comfort goal  Pain assessed, medicated per MAR.

## 2017-08-25 NOTE — PROGRESS NOTES
Anesthesia Pain Service    Patient is POD # 1 from a right nephrectomy with Dr Mims.  When seen on the floor today he is with his nurse and claims to be quite comfortable.  He states that he would like to try things without the epidural and desires it to be removed.  He asked me if I thought it was reasonable to which I answered in the positive.  We discussed the PO and IV alternatives and adjuncts to the epidural medicine and he still wished to proceed with the removal which was accomplished without incident.  The epidural insertion site was clean and dry.  The anesthesia pain service will sign off this patient but remain immediately available should any need arise.    Scooter Hager MD

## 2017-08-26 NOTE — DISCHARGE SUMMARY
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    SURGICAL PROCEDURE:  Right radical nephrectomy with vena caval tumor thrombus.    SURGEON:  Manny Mims MD    OPERATIVE COURSE:  Please see the operative report.    HOSPITAL COURSE:  He has done extremely well postoperatively.  He is   ambulating in the miller without difficulty, tolerating a regular diet.  He has   had a bowel movement and pain is controlled.  No nausea, vomiting.  No fever   or chills.    PHYSICAL EXAMINATION:  VITAL SIGNS:  Temperature is 97.6, pulse 70, respirations 18, blood pressure   139/102.  GENERAL:  He is awake, alert and oriented, in no acute distress.  Dressing is   clean, dry, and intact.    LABORATORY DATA:  WBC is 5.9, hemoglobin 12.8, hematocrit 38.3.  Sodium 134,   potassium 4.6, BUN 11, creatinine 1.13.    ASSESSMENT AND PLAN:  He will discharge home today with a prescription for   pain medication and stool softeners.  We discussed activity and diet along   with taking the stool softeners to not become constipated.  Our office will   arrange follow up with Dr. Mims.       ____________________________________     MAIRA HALL / KENISHA    DD:  08/25/2017 11:41:49  DT:  08/25/2017 22:49:02    D#:  [id number]  Job#:  [id number]    cc: MANNY MIMS MD

## 2020-12-30 ENCOUNTER — HOSPITAL ENCOUNTER (OUTPATIENT)
Dept: RADIOLOGY | Facility: MEDICAL CENTER | Age: 55
End: 2020-12-30
Payer: MEDICAID

## 2021-01-08 RX ORDER — SODIUM CHLORIDE 9 MG/ML
INJECTION, SOLUTION INTRAVENOUS CONTINUOUS
Status: CANCELLED | OUTPATIENT
Start: 2021-01-15

## 2021-01-12 ENCOUNTER — PRE-ADMISSION TESTING (OUTPATIENT)
Dept: ADMISSIONS | Facility: MEDICAL CENTER | Age: 56
End: 2021-01-12
Attending: UROLOGY
Payer: MEDICAID

## 2021-01-12 DIAGNOSIS — Z01.812 PRE-OPERATIVE LABORATORY EXAMINATION: ICD-10-CM

## 2021-01-12 LAB
COVID ORDER STATUS COVID19: NORMAL
ERYTHROCYTE [DISTWIDTH] IN BLOOD BY AUTOMATED COUNT: 52.8 FL (ref 35.9–50)
HCT VFR BLD AUTO: 46.3 % (ref 42–52)
HGB BLD-MCNC: 16.9 G/DL (ref 14–18)
INR PPP: 0.95 (ref 0.87–1.13)
MCH RBC QN AUTO: 38.9 PG (ref 27–33)
MCHC RBC AUTO-ENTMCNC: 36.5 G/DL (ref 33.7–35.3)
MCV RBC AUTO: 106.4 FL (ref 81.4–97.8)
PLATELET # BLD AUTO: 273 K/UL (ref 164–446)
PMV BLD AUTO: 8.2 FL (ref 9–12.9)
PROTHROMBIN TIME: 13 SEC (ref 12–14.6)
RBC # BLD AUTO: 4.35 M/UL (ref 4.7–6.1)
SARS-COV-2 RNA RESP QL NAA+PROBE: NOTDETECTED
SPECIMEN SOURCE: NORMAL
WBC # BLD AUTO: 8.1 K/UL (ref 4.8–10.8)

## 2021-01-12 PROCEDURE — 85610 PROTHROMBIN TIME: CPT

## 2021-01-12 PROCEDURE — 36415 COLL VENOUS BLD VENIPUNCTURE: CPT

## 2021-01-12 PROCEDURE — U0003 INFECTIOUS AGENT DETECTION BY NUCLEIC ACID (DNA OR RNA); SEVERE ACUTE RESPIRATORY SYNDROME CORONAVIRUS 2 (SARS-COV-2) (CORONAVIRUS DISEASE [COVID-19]), AMPLIFIED PROBE TECHNIQUE, MAKING USE OF HIGH THROUGHPUT TECHNOLOGIES AS DESCRIBED BY CMS-2020-01-R: HCPCS

## 2021-01-12 PROCEDURE — U0005 INFEC AGEN DETEC AMPLI PROBE: HCPCS

## 2021-01-12 PROCEDURE — 85027 COMPLETE CBC AUTOMATED: CPT

## 2021-01-12 PROCEDURE — C9803 HOPD COVID-19 SPEC COLLECT: HCPCS

## 2021-01-12 RX ORDER — SERTRALINE HYDROCHLORIDE 100 MG/1
100 TABLET, FILM COATED ORAL DAILY
COMMUNITY
Start: 2020-12-28

## 2021-01-14 NOTE — OR NURSING
COVID-19 Pre-Surgery Screenin. Do you have an undiagnosed respiratory illness or symptoms such as coughing or sneezing? **NO*     • Onset of Sx: *NO**    • Acute vs. chronic respiratory illness: *PT HAS COPD**     2. Do you have an unexplained fever greater than 100.4 degrees Fahrenheit or 38 degrees Celsius? *NO**  ?  3. Have you had direct exposure to a patient who tested positive for Covid-19? **NO*    4. Patient informed of current visitation and mask policies by this RN.  BOTH PATIENT AND ACCOMPANIED ADULT ARE AWARE OF MASK AND VISITOR POLICY.

## 2021-01-15 ENCOUNTER — APPOINTMENT (OUTPATIENT)
Dept: RADIOLOGY | Facility: MEDICAL CENTER | Age: 56
End: 2021-01-15
Attending: INTERNAL MEDICINE
Payer: MEDICAID

## 2021-01-15 ENCOUNTER — APPOINTMENT (OUTPATIENT)
Dept: RADIOLOGY | Facility: MEDICAL CENTER | Age: 56
End: 2021-01-15
Attending: UROLOGY
Payer: MEDICAID

## 2021-01-15 ENCOUNTER — HOSPITAL ENCOUNTER (OUTPATIENT)
Facility: MEDICAL CENTER | Age: 56
End: 2021-01-15
Attending: UROLOGY | Admitting: RADIOLOGY
Payer: MEDICAID

## 2021-01-15 ENCOUNTER — APPOINTMENT (OUTPATIENT)
Dept: RADIOLOGY | Facility: MEDICAL CENTER | Age: 56
End: 2021-01-15
Attending: EMERGENCY MEDICINE
Payer: MEDICAID

## 2021-01-15 ENCOUNTER — HOSPITAL ENCOUNTER (EMERGENCY)
Facility: MEDICAL CENTER | Age: 56
End: 2021-01-15
Attending: EMERGENCY MEDICINE | Admitting: INTERNAL MEDICINE
Payer: MEDICAID

## 2021-01-15 VITALS
TEMPERATURE: 97 F | OXYGEN SATURATION: 100 % | DIASTOLIC BLOOD PRESSURE: 97 MMHG | RESPIRATION RATE: 24 BRPM | SYSTOLIC BLOOD PRESSURE: 127 MMHG | BODY MASS INDEX: 19.01 KG/M2 | WEIGHT: 125.44 LBS | HEIGHT: 68 IN | HEART RATE: 83 BPM

## 2021-01-15 VITALS
OXYGEN SATURATION: 94 % | DIASTOLIC BLOOD PRESSURE: 85 MMHG | BODY MASS INDEX: 21.98 KG/M2 | RESPIRATION RATE: 22 BRPM | SYSTOLIC BLOOD PRESSURE: 121 MMHG | WEIGHT: 145 LBS | HEART RATE: 100 BPM | TEMPERATURE: 97.6 F | HEIGHT: 68 IN

## 2021-01-15 DIAGNOSIS — J90 PLEURAL EFFUSION ON RIGHT: ICD-10-CM

## 2021-01-15 DIAGNOSIS — R91.8 LUNG MASS: ICD-10-CM

## 2021-01-15 PROBLEM — E87.1 HYPONATREMIA: Status: ACTIVE | Noted: 2021-01-15

## 2021-01-15 LAB
ALBUMIN SERPL BCP-MCNC: 4.2 G/DL (ref 3.2–4.9)
ALBUMIN/GLOB SERPL: 1.6 G/DL
ALP SERPL-CCNC: 141 U/L (ref 30–99)
ALT SERPL-CCNC: 20 U/L (ref 2–50)
ANION GAP SERPL CALC-SCNC: 20 MMOL/L (ref 7–16)
APPEARANCE FLD: NORMAL
APTT PPP: 28.9 SEC (ref 24.7–36)
AST SERPL-CCNC: 31 U/L (ref 12–45)
BASOPHILS # BLD AUTO: 0.3 % (ref 0–1.8)
BASOPHILS # BLD: 0.02 K/UL (ref 0–0.12)
BILIRUB SERPL-MCNC: 0.7 MG/DL (ref 0.1–1.5)
BODY FLD TYPE: NORMAL
BUN SERPL-MCNC: 12 MG/DL (ref 8–22)
CALCIUM SERPL-MCNC: 9.2 MG/DL (ref 8.5–10.5)
CHLORIDE SERPL-SCNC: 87 MMOL/L (ref 96–112)
CO2 SERPL-SCNC: 20 MMOL/L (ref 20–33)
COLOR FLD: NORMAL
CREAT SERPL-MCNC: 0.82 MG/DL (ref 0.5–1.4)
CSF COMMENTS 1658: NORMAL
CYTOLOGY REG CYTOL: NORMAL
EOSINOPHIL # BLD AUTO: 0.05 K/UL (ref 0–0.51)
EOSINOPHIL NFR BLD: 0.7 % (ref 0–6.9)
ERYTHROCYTE [DISTWIDTH] IN BLOOD BY AUTOMATED COUNT: 52 FL (ref 35.9–50)
GLOBULIN SER CALC-MCNC: 2.7 G/DL (ref 1.9–3.5)
GLUCOSE FLD-MCNC: 105 MG/DL
GLUCOSE SERPL-MCNC: 109 MG/DL (ref 65–99)
GRAM STN SPEC: NORMAL
HCT VFR BLD AUTO: 46.9 % (ref 42–52)
HGB BLD-MCNC: 17.1 G/DL (ref 14–18)
IMM GRANULOCYTES # BLD AUTO: 0.05 K/UL (ref 0–0.11)
IMM GRANULOCYTES NFR BLD AUTO: 0.7 % (ref 0–0.9)
INR PPP: 0.92 (ref 0.87–1.13)
LDH FLD L TO P-CCNC: 273 U/L
LDH SERPL L TO P-CCNC: 246 U/L (ref 107–266)
LYMPHOCYTES # BLD AUTO: 0.54 K/UL (ref 1–4.8)
LYMPHOCYTES NFR BLD: 7.9 % (ref 22–41)
LYMPHOCYTES NFR FLD: 11 %
MCH RBC QN AUTO: 37.6 PG (ref 27–33)
MCHC RBC AUTO-ENTMCNC: 36.5 G/DL (ref 33.7–35.3)
MCV RBC AUTO: 103.1 FL (ref 81.4–97.8)
MESOTHL CELL NFR FLD: 1 %
MONOCYTES # BLD AUTO: 0.72 K/UL (ref 0–0.85)
MONOCYTES NFR BLD AUTO: 10.5 % (ref 0–13.4)
MONONUC CELLS NFR FLD: 1 %
NEUTROPHILS # BLD AUTO: 5.47 K/UL (ref 1.82–7.42)
NEUTROPHILS NFR BLD: 79.9 % (ref 44–72)
NEUTROPHILS NFR FLD: 87 %
NRBC # BLD AUTO: 0 K/UL
NRBC BLD-RTO: 0 /100 WBC
PH FLD: 8 [PH]
PLATELET # BLD AUTO: 224 K/UL (ref 164–446)
PMV BLD AUTO: 8.4 FL (ref 9–12.9)
POTASSIUM SERPL-SCNC: 3.6 MMOL/L (ref 3.6–5.5)
PROT FLD-MCNC: 5.6 G/DL
PROT SERPL-MCNC: 6.9 G/DL (ref 6–8.2)
PROT SERPL-MCNC: 7 G/DL (ref 6–8.2)
PROTHROMBIN TIME: 12.6 SEC (ref 12–14.6)
RBC # BLD AUTO: 4.55 M/UL (ref 4.7–6.1)
RBC # FLD: NORMAL CELLS/UL
SARS-COV-2 RDRP RESP QL NAA+PROBE: NOTDETECTED
SARS-COV-2 RNA RESP QL NAA+PROBE: NOTDETECTED
SIGNIFICANT IND 70042: NORMAL
SITE SITE: NORMAL
SODIUM SERPL-SCNC: 127 MMOL/L (ref 135–145)
SOURCE SOURCE: NORMAL
SPECIMEN SOURCE: NORMAL
SPECIMEN SOURCE: NORMAL
WBC # BLD AUTO: 6.9 K/UL (ref 4.8–10.8)
WBC # FLD: 1572 CELLS/UL

## 2021-01-15 PROCEDURE — 82945 GLUCOSE OTHER FLUID: CPT

## 2021-01-15 PROCEDURE — 84155 ASSAY OF PROTEIN SERUM: CPT | Mod: XU

## 2021-01-15 PROCEDURE — 84157 ASSAY OF PROTEIN OTHER: CPT

## 2021-01-15 PROCEDURE — 89051 BODY FLUID CELL COUNT: CPT

## 2021-01-15 PROCEDURE — 700102 HCHG RX REV CODE 250 W/ 637 OVERRIDE(OP): Performed by: INTERNAL MEDICINE

## 2021-01-15 PROCEDURE — 32555 ASPIRATE PLEURA W/ IMAGING: CPT | Mod: LT

## 2021-01-15 PROCEDURE — A9270 NON-COVERED ITEM OR SERVICE: HCPCS | Performed by: INTERNAL MEDICINE

## 2021-01-15 PROCEDURE — 85025 COMPLETE CBC W/AUTO DIFF WBC: CPT

## 2021-01-15 PROCEDURE — 700105 HCHG RX REV CODE 258: Performed by: NURSE PRACTITIONER

## 2021-01-15 PROCEDURE — 87070 CULTURE OTHR SPECIMN AEROBIC: CPT

## 2021-01-15 PROCEDURE — U0003 INFECTIOUS AGENT DETECTION BY NUCLEIC ACID (DNA OR RNA); SEVERE ACUTE RESPIRATORY SYNDROME CORONAVIRUS 2 (SARS-COV-2) (CORONAVIRUS DISEASE [COVID-19]), AMPLIFIED PROBE TECHNIQUE, MAKING USE OF HIGH THROUGHPUT TECHNOLOGIES AS DESCRIBED BY CMS-2020-01-R: HCPCS

## 2021-01-15 PROCEDURE — 87075 CULTR BACTERIA EXCEPT BLOOD: CPT

## 2021-01-15 PROCEDURE — 87205 SMEAR GRAM STAIN: CPT

## 2021-01-15 PROCEDURE — 71045 X-RAY EXAM CHEST 1 VIEW: CPT

## 2021-01-15 PROCEDURE — 88341 IMHCHEM/IMCYTCHM EA ADD ANTB: CPT | Mod: 91

## 2021-01-15 PROCEDURE — 83615 LACTATE (LD) (LDH) ENZYME: CPT | Mod: 91

## 2021-01-15 PROCEDURE — 88305 TISSUE EXAM BY PATHOLOGIST: CPT

## 2021-01-15 PROCEDURE — 88112 CYTOPATH CELL ENHANCE TECH: CPT

## 2021-01-15 PROCEDURE — 85730 THROMBOPLASTIN TIME PARTIAL: CPT

## 2021-01-15 PROCEDURE — 99284 EMERGENCY DEPT VISIT MOD MDM: CPT

## 2021-01-15 PROCEDURE — 87186 SC STD MICRODIL/AGAR DIL: CPT

## 2021-01-15 PROCEDURE — U0004 COV-19 TEST NON-CDC HGH THRU: HCPCS

## 2021-01-15 PROCEDURE — 99253 IP/OBS CNSLTJ NEW/EST LOW 45: CPT | Performed by: INTERNAL MEDICINE

## 2021-01-15 PROCEDURE — 80053 COMPREHEN METABOLIC PANEL: CPT

## 2021-01-15 PROCEDURE — 36415 COLL VENOUS BLD VENIPUNCTURE: CPT

## 2021-01-15 PROCEDURE — 87077 CULTURE AEROBIC IDENTIFY: CPT

## 2021-01-15 PROCEDURE — 88342 IMHCHEM/IMCYTCHM 1ST ANTB: CPT

## 2021-01-15 PROCEDURE — U0005 INFEC AGEN DETEC AMPLI PROBE: HCPCS

## 2021-01-15 PROCEDURE — 85610 PROTHROMBIN TIME: CPT

## 2021-01-15 PROCEDURE — 83986 ASSAY PH BODY FLUID NOS: CPT

## 2021-01-15 PROCEDURE — 99223 1ST HOSP IP/OBS HIGH 75: CPT | Performed by: INTERNAL MEDICINE

## 2021-01-15 RX ORDER — ONDANSETRON 4 MG/1
4 TABLET, ORALLY DISINTEGRATING ORAL EVERY 4 HOURS PRN
Status: DISCONTINUED | OUTPATIENT
Start: 2021-01-15 | End: 2021-01-15 | Stop reason: HOSPADM

## 2021-01-15 RX ORDER — PROMETHAZINE HYDROCHLORIDE 25 MG/1
12.5-25 TABLET ORAL EVERY 4 HOURS PRN
Status: DISCONTINUED | OUTPATIENT
Start: 2021-01-15 | End: 2021-01-15 | Stop reason: HOSPADM

## 2021-01-15 RX ORDER — POLYETHYLENE GLYCOL 3350 17 G/17G
1 POWDER, FOR SOLUTION ORAL
Status: DISCONTINUED | OUTPATIENT
Start: 2021-01-15 | End: 2021-01-15 | Stop reason: HOSPADM

## 2021-01-15 RX ORDER — SODIUM CHLORIDE 9 MG/ML
INJECTION, SOLUTION INTRAVENOUS CONTINUOUS
Status: DISCONTINUED | OUTPATIENT
Start: 2021-01-15 | End: 2021-01-15 | Stop reason: HOSPADM

## 2021-01-15 RX ORDER — PROMETHAZINE HYDROCHLORIDE 25 MG/1
12.5-25 SUPPOSITORY RECTAL EVERY 4 HOURS PRN
Status: DISCONTINUED | OUTPATIENT
Start: 2021-01-15 | End: 2021-01-15 | Stop reason: HOSPADM

## 2021-01-15 RX ORDER — SODIUM CHLORIDE 9 MG/ML
500 INJECTION, SOLUTION INTRAVENOUS
Status: DISCONTINUED | OUTPATIENT
Start: 2021-01-15 | End: 2021-01-15 | Stop reason: HOSPADM

## 2021-01-15 RX ORDER — MIDAZOLAM HYDROCHLORIDE 1 MG/ML
INJECTION INTRAMUSCULAR; INTRAVENOUS
Status: DISCONTINUED
Start: 2021-01-15 | End: 2021-01-15 | Stop reason: HOSPADM

## 2021-01-15 RX ORDER — FLUMAZENIL 0.1 MG/ML
INJECTION INTRAVENOUS
Status: DISCONTINUED
Start: 2021-01-15 | End: 2021-01-15 | Stop reason: HOSPADM

## 2021-01-15 RX ORDER — ACETAMINOPHEN 325 MG/1
650 TABLET ORAL EVERY 6 HOURS PRN
Status: DISCONTINUED | OUTPATIENT
Start: 2021-01-15 | End: 2021-01-15 | Stop reason: HOSPADM

## 2021-01-15 RX ORDER — MIDAZOLAM HYDROCHLORIDE 1 MG/ML
.5-2 INJECTION INTRAMUSCULAR; INTRAVENOUS PRN
Status: DISCONTINUED | OUTPATIENT
Start: 2021-01-15 | End: 2021-01-15 | Stop reason: HOSPADM

## 2021-01-15 RX ORDER — AMOXICILLIN 250 MG
2 CAPSULE ORAL 2 TIMES DAILY
Status: DISCONTINUED | OUTPATIENT
Start: 2021-01-15 | End: 2021-01-15 | Stop reason: HOSPADM

## 2021-01-15 RX ORDER — ONDANSETRON 2 MG/ML
4 INJECTION INTRAMUSCULAR; INTRAVENOUS EVERY 4 HOURS PRN
Status: DISCONTINUED | OUTPATIENT
Start: 2021-01-15 | End: 2021-01-15 | Stop reason: HOSPADM

## 2021-01-15 RX ORDER — HEPARIN SODIUM 5000 [USP'U]/ML
5000 INJECTION, SOLUTION INTRAVENOUS; SUBCUTANEOUS EVERY 8 HOURS
Status: DISCONTINUED | OUTPATIENT
Start: 2021-01-15 | End: 2021-01-15 | Stop reason: HOSPADM

## 2021-01-15 RX ORDER — ALBUTEROL SULFATE 90 UG/1
1-2 AEROSOL, METERED RESPIRATORY (INHALATION) EVERY 4 HOURS PRN
COMMUNITY
End: 2021-01-27

## 2021-01-15 RX ORDER — SERTRALINE HYDROCHLORIDE 100 MG/1
100 TABLET, FILM COATED ORAL DAILY
Status: DISCONTINUED | OUTPATIENT
Start: 2021-01-15 | End: 2021-01-15 | Stop reason: HOSPADM

## 2021-01-15 RX ORDER — BISACODYL 10 MG
10 SUPPOSITORY, RECTAL RECTAL
Status: DISCONTINUED | OUTPATIENT
Start: 2021-01-15 | End: 2021-01-15 | Stop reason: HOSPADM

## 2021-01-15 RX ORDER — ONDANSETRON 2 MG/ML
4 INJECTION INTRAMUSCULAR; INTRAVENOUS PRN
Status: DISCONTINUED | OUTPATIENT
Start: 2021-01-15 | End: 2021-01-15 | Stop reason: HOSPADM

## 2021-01-15 RX ORDER — NALOXONE HYDROCHLORIDE 0.4 MG/ML
INJECTION, SOLUTION INTRAMUSCULAR; INTRAVENOUS; SUBCUTANEOUS
Status: DISCONTINUED
Start: 2021-01-15 | End: 2021-01-15 | Stop reason: HOSPADM

## 2021-01-15 RX ORDER — PROCHLORPERAZINE EDISYLATE 5 MG/ML
5-10 INJECTION INTRAMUSCULAR; INTRAVENOUS EVERY 4 HOURS PRN
Status: DISCONTINUED | OUTPATIENT
Start: 2021-01-15 | End: 2021-01-15 | Stop reason: HOSPADM

## 2021-01-15 RX ADMIN — SODIUM CHLORIDE: 9 INJECTION, SOLUTION INTRAVENOUS at 06:58

## 2021-01-15 RX ADMIN — SERTRALINE HYDROCHLORIDE 100 MG: 100 TABLET ORAL at 12:56

## 2021-01-15 ASSESSMENT — ENCOUNTER SYMPTOMS
NEUROLOGICAL NEGATIVE: 1
STRIDOR: 0
PALPITATIONS: 0
RESPIRATORY NEGATIVE: 1
COUGH: 0
EYE REDNESS: 0
FEVER: 0
DIARRHEA: 0
WEIGHT LOSS: 0
EYE PAIN: 0
EYE DISCHARGE: 0
VOMITING: 0
ABDOMINAL PAIN: 0
NAUSEA: 0
CONSTITUTIONAL NEGATIVE: 1
FOCAL WEAKNESS: 0
SEIZURES: 0
NERVOUS/ANXIOUS: 0
DIZZINESS: 0
BACK PAIN: 0
NECK PAIN: 0
INSOMNIA: 0
GASTROINTESTINAL NEGATIVE: 1
MUSCULOSKELETAL NEGATIVE: 1
BLURRED VISION: 0
SHORTNESS OF BREATH: 1
MYALGIAS: 0
DEPRESSION: 0
HEADACHES: 0
SPUTUM PRODUCTION: 0
EYES NEGATIVE: 1
ORTHOPNEA: 0
HEARTBURN: 0
CHILLS: 0
CARDIOVASCULAR NEGATIVE: 1
PSYCHIATRIC NEGATIVE: 1

## 2021-01-15 ASSESSMENT — LIFESTYLE VARIABLES
CONSUMPTION TOTAL: NEGATIVE
EVER HAD A DRINK FIRST THING IN THE MORNING TO STEADY YOUR NERVES TO GET RID OF A HANGOVER: NO
AVERAGE NUMBER OF DAYS PER WEEK YOU HAVE A DRINK CONTAINING ALCOHOL: 0
TOTAL SCORE: 0
ON A TYPICAL DAY WHEN YOU DRINK ALCOHOL HOW MANY DRINKS DO YOU HAVE: 0
TOTAL SCORE: 0
EVER FELT BAD OR GUILTY ABOUT YOUR DRINKING: NO
TOTAL SCORE: 0
HAVE YOU EVER FELT YOU SHOULD CUT DOWN ON YOUR DRINKING: NO
HOW MANY TIMES IN THE PAST YEAR HAVE YOU HAD 5 OR MORE DRINKS IN A DAY: 0
DO YOU DRINK ALCOHOL: NO
HAVE PEOPLE ANNOYED YOU BY CRITICIZING YOUR DRINKING: NO

## 2021-01-15 ASSESSMENT — PAIN DESCRIPTION - PAIN TYPE: TYPE: ACUTE PAIN

## 2021-01-15 NOTE — ASSESSMENT & PLAN NOTE
New onset  History of renal cell carcinoma in the past  Concerning for malignancy related  I ordered ultrasound-guided thoracentesis  Pleural fluid analysis: Ordered  Pulmonary following  If positive for malignancy, to consult oncology

## 2021-01-15 NOTE — H&P
History and Physical    Date: 1/15/2021    PCP: Luis Carlos Wall M.D.      CC: Bilateralpulmonary nodules    HPI: This is a 55 y.o. male who is presenting with above    Past Medical History:   Diagnosis Date   • Cancer (HCC) 7/2017    right kidney   • COPD (chronic obstructive pulmonary disease) (HCC)     mild   • Emphysema of lung (HCC)    • Hypertension    • Pain     right testicle, bladder   • Psychiatric problem     anxiety w/MRI; claustrophobic   • Urinary bladder disorder     bloody urine due to cancer       Past Surgical History:   Procedure Laterality Date   • NEPHRECTOMY RADICAL Right 8/23/2017    Procedure: NEPHRECTOMY WITH TUMOR THROMBUS RESECTION ;  Surgeon: Héctor Mims M.D.;  Location: SURGERY [facility];  Service:        Current Facility-Administered Medications   Medication Dose Route Frequency Provider Last Rate Last Admin   • NS infusion   Intravenous Continuous Nadiya BECCA Son, A.P.N. 125 mL/hr at 01/15/21 0658 New Bag at 01/15/21 0658   • NS (BOLUS) infusion 500 mL  500 mL Intravenous Once PRN Dotnae Randall M.D.       • fentaNYL (SUBLIMAZE) injection 12.5-50 mcg  12.5-50 mcg Intravenous PRN Dontae Randall M.D.       • midazolam (VERSED) injection 0.5-2 mg  0.5-2 mg Intravenous PRN Dontae Randall M.D.       • ondansetron (ZOFRAN) syringe/vial injection 4 mg  4 mg Intravenous PRN Dontae Randall M.D.            Social History     Socioeconomic History   • Marital status:      Spouse name: Not on file   • Number of children: Not on file   • Years of education: Not on file   • Highest education level: Not on file   Occupational History   • Not on file   Social Needs   • Financial resource strain: Not on file   • Food insecurity     Worry: Not on file     Inability: Not on file   • Transportation needs     Medical: Not on file     Non-medical: Not on file   Tobacco Use   • Smoking status: Current Every Day Smoker     Packs/day: 0.25     Years: 30.00     Pack years: 7.50      Types: Cigarettes   • Smokeless tobacco: Never Used   • Tobacco comment: 2 cig/day   Substance and Sexual Activity   • Alcohol use: Yes     Comment: 2-3 per week   • Drug use: Yes     Types: Inhaled     Comment: marijuana- last dose 5 days ago, uses occasionally   • Sexual activity: Not on file   Lifestyle   • Physical activity     Days per week: Not on file     Minutes per session: Not on file   • Stress: Not on file   Relationships   • Social connections     Talks on phone: Not on file     Gets together: Not on file     Attends [employer] service: Not on file     Active member of club or organization: Not on file     Attends meetings of clubs or organizations: Not on file     Relationship status: Not on file   • Intimate partner violence     Fear of current or ex partner: Not on file     Emotionally abused: Not on file     Physically abused: Not on file     Forced sexual activity: Not on file   Other Topics Concern   • Not on file   Social History Narrative   • Not on file       History reviewed. No pertinent family history.    Allergies:  Patient has no known allergies.    Review of Systems:  Negative except for chronic dyspnea    Physical Exam   Constitutional: He is oriented to person, place, and time. He appears well-developed and well-nourished.   HENT:   Head: Normocephalic and atraumatic.   Eyes: Conjunctivae are normal. Right eye exhibits no discharge. Left eye exhibits no discharge. No scleral icterus.   Neck: Neck supple.   Cardiovascular: Regular rhythm.   Pulmonary/Chest: Effort normal. No respiratory distress.   Abdominal: Soft.   Neurological: He is alert and oriented to person, place, and time.   Skin: Skin is warm and dry.   Psychiatric: He has a normal mood and affect. His behavior is normal. Judgment and thought content normal.       Vital Signs  Blood Pressure: 130/93   Temperature: 36.1 °C (97 °F)   Pulse: 92   Respiration: (!) 24   Pulse Oximetry: 98 %        Labs:  Recent Labs      01/12/21  0919   WBC 8.1   RBC 4.35*   HEMOGLOBIN 16.9   HEMATOCRIT 46.3   .4*   MCH 38.9*   MCHC 36.5*   RDW 52.8*   PLATELETCT 273   MPV 8.2*         Recent Labs     01/12/21  0919   INR 0.95     Recent Labs     01/12/21 0919   INR 0.95       Radiology:  No orders to display             Assessment and Plan:This is a 55 y.o. here for LEFT lung nodule biopsy under CT

## 2021-01-15 NOTE — CONSULTS
Pulmonary Consultation    Date of consult: 1/15/2021    Referring Physician  Won Farrar M.D.    Reason for Consultation  Pleural effusion    History of Presenting Illness  55 y.o. male who presented 1/15/2021 with COPD on 1-2 l of oxygen,  hx of right renal carcinoma dx in 2017 and underwent a right radical nephrectomy with vena caval thrombus.  Found to have a pulmonary nodule on the left and was referred for IR bx but was tachypneac and found to have a large left pleural effusion.  Underwent drainage by IR later this am and feeling better and on RA    Code Status  Full Code    Review of Systems  Review of Systems   Constitutional: Negative.    HENT: Negative.    Eyes: Negative.    Respiratory: Negative.    Cardiovascular: Negative.    Gastrointestinal: Negative.    Genitourinary: Negative.    Musculoskeletal: Negative.    Skin: Negative.    Neurological: Negative.    Endo/Heme/Allergies: Negative.    Psychiatric/Behavioral: Negative.        Past Medical History   has a past medical history of Cancer (HCC) (7/2017), COPD (chronic obstructive pulmonary disease) (HCC), Emphysema of lung (HCC), Hypertension, Pain, Psychiatric problem, and Urinary bladder disorder.    Surgical History   has a past surgical history that includes nephrectomy radical (Right, 8/23/2017).    Family History  family history is not on file.    Social History   reports that he has been smoking cigarettes. He has a 7.50 pack-year smoking history. He has never used smokeless tobacco. He reports current alcohol use. He reports current drug use. Drug: Inhaled.    Medications  Home Medications     Reviewed by Malachi Gallardo (Pharmacy Tech) on 01/15/21 at 0942  Med List Status: Complete   Medication Last Dose Status   albuterol 108 (90 Base) MCG/ACT Aero Soln inhalation aerosol FEW DAYS AGO Active   docusate sodium (COLACE) 100 MG Cap NOT TAKING Active   sertraline (ZOLOFT) 100 MG Tab 1/14/2021 Active              Current Facility-Administered  Medications   Medication Dose Route Frequency Provider Last Rate Last Admin   • sertraline (Zoloft) tablet 100 mg  100 mg Oral DAILY Won Farrar M.D.   100 mg at 01/15/21 1256   • senna-docusate (PERICOLACE or SENOKOT S) 8.6-50 MG per tablet 2 Tab  2 Tab Oral BID Won Farrar M.D.        And   • polyethylene glycol/lytes (MIRALAX) PACKET 1 Packet  1 Packet Oral QDAY PRN Won Farrar M.D.        And   • magnesium hydroxide (MILK OF MAGNESIA) suspension 30 mL  30 mL Oral QDAY PRN Won Farrar M.D.        And   • bisacodyl (DULCOLAX) suppository 10 mg  10 mg Rectal QDAY PRN Won Farrar M.D.       • heparin injection 5,000 Units  5,000 Units Subcutaneous Q8HRS Won Farrar M.D.       • acetaminophen (Tylenol) tablet 650 mg  650 mg Oral Q6HRS PRN Won Farrar M.D.       • ondansetron (ZOFRAN) syringe/vial injection 4 mg  4 mg Intravenous Q4HRS PRN Won Farrar M.D.       • ondansetron (ZOFRAN ODT) dispertab 4 mg  4 mg Oral Q4HRS PRN Won Farrar M.D.       • promethazine (PHENERGAN) tablet 12.5-25 mg  12.5-25 mg Oral Q4HRS PRN Won Farrar M.D.       • promethazine (PHENERGAN) suppository 12.5-25 mg  12.5-25 mg Rectal Q4HRS PRN Won Farrar M.D.       • prochlorperazine (COMPAZINE) injection 5-10 mg  5-10 mg Intravenous Q4HRS PRN Won Farrar M.D.         Current Outpatient Medications   Medication Sig Dispense Refill   • albuterol 108 (90 Base) MCG/ACT Aero Soln inhalation aerosol Inhale 1-2 Puffs every four hours as needed for Shortness of Breath.     • sertraline (ZOLOFT) 100 MG Tab Take 100 mg by mouth every day.     • docusate sodium (COLACE) 100 MG Cap Take 1 Cap by mouth 2 times a day. (Patient not taking: Reported on 1/15/2021) 60 Cap 0       Allergies  No Known Allergies    Vital Signs last 24 hours  Temp:  [36.3 °C (97.3 °F)-36.4 °C (97.6 °F)] 36.4 °C (97.6 °F)  Pulse:  [] 100  Resp:  [18-25] 22  BP: (116-135)/(85-99) 121/85  SpO2:  [92 %-96 %] 94 %    Physical Exam  Physical Exam  Constitutional:       Appearance:  Normal appearance.   HENT:      Head: Normocephalic.      Mouth/Throat:      Mouth: Mucous membranes are moist.   Eyes:      Extraocular Movements: Extraocular movements intact.      Pupils: Pupils are equal, round, and reactive to light.   Cardiovascular:      Rate and Rhythm: Normal rate.      Pulses: Normal pulses.   Pulmonary:      Effort: Pulmonary effort is normal. No respiratory distress.      Breath sounds: Normal breath sounds. No wheezing.   Abdominal:      General: Abdomen is flat.      Palpations: Abdomen is soft.   Musculoskeletal: Normal range of motion.   Skin:     General: Skin is warm and dry.   Neurological:      General: No focal deficit present.      Mental Status: He is alert and oriented to person, place, and time.   Psychiatric:         Mood and Affect: Mood normal.         Behavior: Behavior normal.         Thought Content: Thought content normal.         Judgment: Judgment normal.         Fluids  No intake or output data in the 24 hours ending 01/15/21 1630    Laboratory  Recent Results (from the past 48 hour(s))   CBC WITH DIFFERENTIAL    Collection Time: 01/15/21  8:33 AM   Result Value Ref Range    WBC 6.9 4.8 - 10.8 K/uL    RBC 4.55 (L) 4.70 - 6.10 M/uL    Hemoglobin 17.1 14.0 - 18.0 g/dL    Hematocrit 46.9 42.0 - 52.0 %    .1 (H) 81.4 - 97.8 fL    MCH 37.6 (H) 27.0 - 33.0 pg    MCHC 36.5 (H) 33.7 - 35.3 g/dL    RDW 52.0 (H) 35.9 - 50.0 fL    Platelet Count 224 164 - 446 K/uL    MPV 8.4 (L) 9.0 - 12.9 fL    Neutrophils-Polys 79.90 (H) 44.00 - 72.00 %    Lymphocytes 7.90 (L) 22.00 - 41.00 %    Monocytes 10.50 0.00 - 13.40 %    Eosinophils 0.70 0.00 - 6.90 %    Basophils 0.30 0.00 - 1.80 %    Immature Granulocytes 0.70 0.00 - 0.90 %    Nucleated RBC 0.00 /100 WBC    Neutrophils (Absolute) 5.47 1.82 - 7.42 K/uL    Lymphs (Absolute) 0.54 (L) 1.00 - 4.80 K/uL    Monos (Absolute) 0.72 0.00 - 0.85 K/uL    Eos (Absolute) 0.05 0.00 - 0.51 K/uL    Baso (Absolute) 0.02 0.00 - 0.12 K/uL     Immature Granulocytes (abs) 0.05 0.00 - 0.11 K/uL    NRBC (Absolute) 0.00 K/uL   Comp Metabolic Panel    Collection Time: 01/15/21  8:33 AM   Result Value Ref Range    Sodium 127 (L) 135 - 145 mmol/L    Potassium 3.6 3.6 - 5.5 mmol/L    Chloride 87 (L) 96 - 112 mmol/L    Co2 20 20 - 33 mmol/L    Anion Gap 20.0 (H) 7.0 - 16.0    Glucose 109 (H) 65 - 99 mg/dL    Bun 12 8 - 22 mg/dL    Creatinine 0.82 0.50 - 1.40 mg/dL    Calcium 9.2 8.5 - 10.5 mg/dL    AST(SGOT) 31 12 - 45 U/L    ALT(SGPT) 20 2 - 50 U/L    Alkaline Phosphatase 141 (H) 30 - 99 U/L    Total Bilirubin 0.7 0.1 - 1.5 mg/dL    Albumin 4.2 3.2 - 4.9 g/dL    Total Protein 6.9 6.0 - 8.2 g/dL    Globulin 2.7 1.9 - 3.5 g/dL    A-G Ratio 1.6 g/dL   Prothrombin Time    Collection Time: 01/15/21  8:33 AM   Result Value Ref Range    PT 12.6 12.0 - 14.6 sec    INR 0.92 0.87 - 1.13   APTT    Collection Time: 01/15/21  8:33 AM   Result Value Ref Range    APTT 28.9 24.7 - 36.0 sec   ESTIMATED GFR    Collection Time: 01/15/21  8:33 AM   Result Value Ref Range    GFR If African American >60 >60 mL/min/1.73 m 2    GFR If Non African American >60 >60 mL/min/1.73 m 2   LDH    Collection Time: 01/15/21  8:33 AM   Result Value Ref Range    LDH Total 246 107 - 266 U/L   Total Protein    Collection Time: 01/15/21  8:33 AM   Result Value Ref Range    Total Protein 7.0 6.0 - 8.2 g/dL   SARS-CoV-2 PCR (24 hour In-House): Collect NP swab in [facility]    Collection Time: 01/15/21 10:00 AM    Specimen: Respirate   Result Value Ref Range    SARS-CoV-2 Source NP Swab     SARS-CoV-2 by PCR NotDetected    SARS-CoV-2, PCR (In-House)    Collection Time: 01/15/21 10:00 AM   Result Value Ref Range    SARS-CoV-2 Source Nasal Swab     SARS-CoV-2 (RdRp gene) NotDetected    Cytology    Collection Time: 01/15/21  1:08 PM   Result Value Ref Range    Cytology Reg See Path Report        Imaging  cxr post thoracentesis   RLL nodule and all effusion is drained. No pneumothorax    Assessment/Plan  Pleural  effusion on left- (present on admission)  Assessment & Plan  Concern for malignancy especially in light of hx of renal ca  Underwent a thoracentesis this am (Was going to have a CT guided bx but then a large pleural effusion was seen)  Will arrange follow up in pulmonary clinic for results Likely Monday pm versus Tuesday  D/w Dr. Farrar      Discussed patient with Dr. Farrar

## 2021-01-15 NOTE — ED PROVIDER NOTES
ED Provider Note    CHIEF COMPLAINT  Chief Complaint   Patient presents with   • Sent by MD     From outpatient CT scan in this ER for increased L pleural effusion since last scan possibly mid december. Pt was in CT for a biopsy of nodules. Procedure was not started. Sent here for possible intervention for effusion. Covid negative three days ago, hx kidney cancer with removal X 1 and COPD, home O2 1-2L baseline, not requiring extra O2 at this time.        HPI  Zachery Heath is a 55 y.o. male PMH right renal cancer s/p nephrectomy, who presents with tachypnea and significant shortness of breath.  He was brought in from outpatient pulmonology, was undergoing CT-guided biopsy of new lung nodules, but when CT was taken for procedure was noted to have significantly large left-sided pleural effusion and transferred to the ED for evaluation.    Per patient, has history of COPD, developed worsening shortness of breath over the past month associated with cough productive of significant amount of mucus.  He has been evaluated several times for this and was on home oxygen at 1-2 L, this was when pulmonary nodules were noted on outside imaging.  Last imaging was performed 12/30 with no evidence of pleural effusion.  Patient denies fever, chills, weight loss, chest pain, lightheadedness, dizziness, nausea/vomiting, abdominal pain.      ALLERGIES  No Known Allergies    CURRENT MEDICATIONS  Home Medications     Reviewed by Malachi Gallardo (Pharmacy Tech) on 01/15/21 at 0942  Med List Status: Complete   Medication Last Dose Status   albuterol 108 (90 Base) MCG/ACT Aero Soln inhalation aerosol FEW DAYS AGO Active   docusate sodium (COLACE) 100 MG Cap NOT TAKING Active   sertraline (ZOLOFT) 100 MG Tab 1/14/2021 Active                PAST MEDICAL HISTORY   has a past medical history of Cancer (HCC) (7/2017), COPD (chronic obstructive pulmonary disease) (HCC), Emphysema of lung (HCC), Hypertension, Pain, Psychiatric problem,  "and Urinary bladder disorder.    SURGICAL HISTORY   has a past surgical history that includes nephrectomy radical (Right, 8/23/2017).    SOCIAL HISTORY  Social History     Tobacco Use   • Smoking status: Current Every Day Smoker     Packs/day: 0.25     Years: 30.00     Pack years: 7.50     Types: Cigarettes   • Smokeless tobacco: Never Used   • Tobacco comment: 2 cig/day   Substance and Sexual Activity   • Alcohol use: Yes     Comment: 2-3 per week   • Drug use: Yes     Types: Inhaled     Comment: marijuana- last dose 5 days ago, uses occasionally   • Sexual activity: Not on file       Family Hx:  Denies contributory family history      REVIEW OF SYSTEMS  See HPI for further details.  All other systems are negative except as above in HPI.    PHYSICAL EXAM  VITAL SIGNS: /98   Pulse 91   Temp 36.3 °C (97.3 °F) (Temporal)   Resp (!) 22   Ht 1.727 m (5' 8\")   Wt 65.8 kg (145 lb)   SpO2 96%   BMI 22.05 kg/m²     General:  WDWN, nontoxic appearing in moderate acute distress; A+Ox3; V/S as above   Skin: warm and dry; good color; no rash  HEENT: NCAT; EOMs intact; PERRL; no scleral icterus   Neck: FROM; no meningismus, no LAD  Cardiovascular: Tachycardic heart rate and regular rhythm.  No murmurs, rubs, or gallops; pulses 2+ bilaterally radially.  Lungs: Diminished breath sounds over left middle and lower lobes, otherwise clear to auscultation, mild end expiratory wheezing throughout audible lung fields.  Abdomen: BS present; soft; mild midepigastric tenderness to palpation, ND; no rebound, guarding, or rigidity.  No organomegaly or pulsatile mass; no CVAT   Extremities: ARGUETA x 4; no e/o trauma; no pedal edema; neg Charis's  Neurologic: CNs III-XII grossly intact; speech clear; distal sensation intact; strength 5/5 UE/LEs  Psychiatric: Appropriate affect, normal mood    LABS  Results for orders placed or performed during the hospital encounter of 01/15/21   CBC WITH DIFFERENTIAL   Result Value Ref Range    WBC " 6.9 4.8 - 10.8 K/uL    RBC 4.55 (L) 4.70 - 6.10 M/uL    Hemoglobin 17.1 14.0 - 18.0 g/dL    Hematocrit 46.9 42.0 - 52.0 %    .1 (H) 81.4 - 97.8 fL    MCH 37.6 (H) 27.0 - 33.0 pg    MCHC 36.5 (H) 33.7 - 35.3 g/dL    RDW 52.0 (H) 35.9 - 50.0 fL    Platelet Count 224 164 - 446 K/uL    MPV 8.4 (L) 9.0 - 12.9 fL    Neutrophils-Polys 79.90 (H) 44.00 - 72.00 %    Lymphocytes 7.90 (L) 22.00 - 41.00 %    Monocytes 10.50 0.00 - 13.40 %    Eosinophils 0.70 0.00 - 6.90 %    Basophils 0.30 0.00 - 1.80 %    Immature Granulocytes 0.70 0.00 - 0.90 %    Nucleated RBC 0.00 /100 WBC    Neutrophils (Absolute) 5.47 1.82 - 7.42 K/uL    Lymphs (Absolute) 0.54 (L) 1.00 - 4.80 K/uL    Monos (Absolute) 0.72 0.00 - 0.85 K/uL    Eos (Absolute) 0.05 0.00 - 0.51 K/uL    Baso (Absolute) 0.02 0.00 - 0.12 K/uL    Immature Granulocytes (abs) 0.05 0.00 - 0.11 K/uL    NRBC (Absolute) 0.00 K/uL   Comp Metabolic Panel   Result Value Ref Range    Sodium 127 (L) 135 - 145 mmol/L    Potassium 3.6 3.6 - 5.5 mmol/L    Chloride 87 (L) 96 - 112 mmol/L    Co2 20 20 - 33 mmol/L    Anion Gap 20.0 (H) 7.0 - 16.0    Glucose 109 (H) 65 - 99 mg/dL    Bun 12 8 - 22 mg/dL    Creatinine 0.82 0.50 - 1.40 mg/dL    Calcium 9.2 8.5 - 10.5 mg/dL    AST(SGOT) 31 12 - 45 U/L    ALT(SGPT) 20 2 - 50 U/L    Alkaline Phosphatase 141 (H) 30 - 99 U/L    Total Bilirubin 0.7 0.1 - 1.5 mg/dL    Albumin 4.2 3.2 - 4.9 g/dL    Total Protein 6.9 6.0 - 8.2 g/dL    Globulin 2.7 1.9 - 3.5 g/dL    A-G Ratio 1.6 g/dL   Prothrombin Time   Result Value Ref Range    PT 12.6 12.0 - 14.6 sec    INR 0.92 0.87 - 1.13   APTT   Result Value Ref Range    APTT 28.9 24.7 - 36.0 sec   SARS-CoV-2 PCR (24 hour In-House): Collect NP swab in VTM    Specimen: Respirate   Result Value Ref Range    SARS-CoV-2 Source NP Swab    ESTIMATED GFR   Result Value Ref Range    GFR If African American >60 >60 mL/min/1.73 m 2    GFR If Non African American >60 >60 mL/min/1.73 m 2   SARS-CoV-2, PCR (In-House)   Result  Value Ref Range    SARS-CoV-2 Source Nasal Swab     SARS-CoV-2 (RdRp gene) NotDetected            IMAGING  CT Chest for aborted CT lung biopsy demonstrating moderate-large R pleural effusion ~50% of lung space.    PROCEDURES  Will need thoracentesis with Pulm or IR    MEDICAL RECORD  I have reviewed patient's medical record and pertinent results are listed below.      COURSE & MEDICAL DECISION MAKING  I have reviewed any medical record information, laboratory studies and radiographic results as noted.    Zachery Heath is a 55 y.o. male who presents complaining of shortness of breath and cough with CT imaging evidence of significant left-sided pleural effusion occupying approximately 50% lung volume.  Patient was evaluated at bedside after urgent transport from outpatient procedure.  In moderate acute distress with tachypnea and shortness of breath, but able to talk and communicate effectively.    Large pleural effusion has developed over the last 2 weeks, as outside imaging performed 12/30 demonstrated no pleural effusion.  Primary concern is metastatic renal cancer to the lungs, causing malignant pleural effusion.  DDx also includes infectious source, cardiac source, GI source; however these are less likely due to patient is afebrile, effusion is unilateral.  Patient will need thoracentesis with fluid studies - call placed for pulmonology.  Eval with CBC, CMP, coag studies ordered.    Appropriate PPE was worn at all times while interacting with the patient, including goggles, N95 mask, and surgical mask.    When room air saturation measured, patient maintain saturation 95% greater in room air, so patient was left off of oxygen.    Pt was re-evaluated at 09:55.  Discussed with pulmonology, they recommend for admission and thoracentesis with them or IR.  Pulmonology will consult on the case.  Discussed plan of care with patient, who is still short of breath, but no longer is tachypneic.  He is understanding of  plan.    Labs significant for hyponatremia, hypochloremia, AG, elevated alk phos.  Not consistent with infectious etiology.  Hospitalist accepted admission for management and workup.    10:56 AM  Discussed with Dr. Farrar who agrees to hospitalize the patient.      FINAL IMPRESSION  1. Pleural effusion on right           I independently evaluated the patient and repeated the important components of the history and physical. I discussed the management with the resident. I have reviewed and agree with the pertinent clinical information above including history, exam, study findings, and recommendations.     Electronically signed by: Vesna Calderón M.D., 1/15/2021 10:57 AM

## 2021-01-15 NOTE — PROCEDURES
Patient brought from ER to US via WC     Left Thoracentesis with 1840cc fluid removed from Left back   Patient tolerated procedure well     Patient ret to ER R12   S/w TYRESE Almendarez  for hand off

## 2021-01-15 NOTE — OR SURGEON
Immediate Post- Operative Note        PostOp Diagnosis: pulmonary nodules, new moderate LEFT pleural effusion      Procedure(s): limited chest CT  No biopsy performed  Needs further medical evaluation  Discussed with pt and he appreciates the situation  Please feel free to contact IR if thoracentesis or other therapy required      Estimated Blood Loss: Less than 5 ml        Complications: None            1/15/2021     8:23 AM     Dontae Randall M.D.

## 2021-01-15 NOTE — DISCHARGE SUMMARY
Discharge Summary    CHIEF COMPLAINT ON ADMISSION  Chief Complaint   Patient presents with   • Sent by MD     From outpatient CT scan in this ER for increased L pleural effusion since last scan possibly mid december. Pt was in CT for a biopsy of nodules. Procedure was not started. Sent here for possible intervention for effusion. Covid negative three days ago, hx kidney cancer with removal X 1 and COPD, home O2 1-2L baseline, not requiring extra O2 at this time.        Reason for Admission  Other     Admission Date  1/15/2021    CODE STATUS  Full Code    HPI & HOSPITAL COURSE  55 y.o. male with past medical history of renal cell carcinoma who presented 1/15/2021 with shortness of breath.  The patient was initiated to have biopsy of her pulmonary nodule and he was found to have new left-sided pleural effusion and the procedure was postponed and he was referred to ER for further evaluation and management.  He has a history of COPD and use 1 to 2 L of oxygen at home.  In the ER pulmonary was consulted also he had a thoracentesis done and able to have 1.8 L of pleural fluid.  Currently the patient is feeling much better after the procedure.  Pulmonologist saw the patient and recommended that the patient can be discharged and follow-up with them as an outpatient and they will follow patient for outpatient follow-up visit.  I saw and examined the patient upon discharge.  Please call [phone] to schedule PCP appointment for patient.    Required specialty appointments include:     As below    Therefore, he is discharged in fair and stable condition to home with close outpatient follow-up.    The patient met 2-midnight criteria for an inpatient stay at the time of discharge.    Discharge Date  01/15/21      FOLLOW UP ITEMS POST DISCHARGE      DISCHARGE DIAGNOSES  Active Problems:    Renal carcinoma (HCC) POA: Yes    Pleural effusion on left POA: Yes    Hyponatremia POA: Yes  Resolved Problems:    * No resolved hospital  problems. *      FOLLOW UP  No future appointments.  Luis Carlos Wall M.D.  [address]  [place] [address] [phone]    In 1 week      Marques Krause M.D.  [address]  [address]  [place] [id number] [phone]    In 1 week        MEDICATIONS ON DISCHARGE     Medication List      CONTINUE taking these medications      Instructions   albuterol 108 (90 Base) MCG/ACT Aers inhalation aerosol   Inhale 1-2 Puffs every four hours as needed for Shortness of Breath.  Dose: 1-2 Puff     docusate sodium 100 MG Caps  Commonly known as: COLACE   Take 1 Cap by mouth 2 times a day.  Dose: 100 mg     sertraline 100 MG Tabs  Commonly known as: Zoloft   Take 100 mg by mouth every day.  Dose: 100 mg            Allergies  No Known Allergies    DIET  Orders Placed This Encounter   Procedures   • Diet Order Diet: Regular     Standing Status:   Standing     Number of Occurrences:   1     Order Specific Question:   Diet:     Answer:   Regular [1]       ACTIVITY  As tolerated.  Weight bearing as tolerated    CONSULTATIONS  pulmonary    PROCEDURES      LABORATORY  Lab Results   Component Value Date    SODIUM 127 (L) 01/15/2021    POTASSIUM 3.6 01/15/2021    CHLORIDE 87 (L) 01/15/2021    CO2 20 01/15/2021    GLUCOSE 109 (H) 01/15/2021    BUN 12 01/15/2021    CREATININE 0.82 01/15/2021        Lab Results   Component Value Date    WBC 6.9 01/15/2021    HEMOGLOBIN 17.1 01/15/2021    HEMATOCRIT 46.9 01/15/2021    PLATELETCT 224 01/15/2021        Total time of the discharge process exceeds 56 minutes.

## 2021-01-15 NOTE — ED NOTES
Pt resting in bed, VSS on 1L NC baseline, GCS 15, NAD, updated POC to admit, will continue to monitor.

## 2021-01-15 NOTE — ED NOTES
Per hospitalist and pulmonology, pt no longer needs to be admitted and will be discharged from the ER, awaiting dc paperwork. Called Wilian XIONG to update and she is contacting bed control.

## 2021-01-15 NOTE — ED TRIAGE NOTES
"Chief Complaint   Patient presents with   • Sent by MD     From outpatient CT scan in this ER for increased L pleural effusion since last scan possibly mid december. Pt was in CT for a biopsy of nodules. Procedure was not started. Sent here for possible intervention for effusion. Covid negative three days ago, hx kidney cancer with removal X 1 and COPD, home O2 1-2L baseline, not requiring extra O2 at this time.      Pt arrives by RN from CT for above complaint. VSS on RA, GCS 15, NAD.    Denies all s/sx of covid, denies recent travel, denies fevers.    /97   Pulse 91   Temp 36.3 °C (97.3 °F) (Temporal)   Resp (!) 24   Ht 1.727 m (5' 8\")   Wt 65.8 kg (145 lb)   SpO2 94%   BMI 22.05 kg/m²      "

## 2021-01-15 NOTE — OR SURGEON
EXAMINATION:  1/15/2021 11:33 AM    HISTORY/REASON FOR EXAM:  Left pleural effusion         TECHNIQUE/EXAM DESCRIPTION:   Ultrasound-guided thoracentesis.    Indication:  LEFT pleural fluid collection.    COMPARISON:  None    PROCEDURE:     Informed consent was obtained. A timeout was taken. A left pleural effusion was localized with real-time ultrasound guidance. The left posterior chest wall was prepped and draped in a sterile manner. Following local anesthesia with 1% lidocaine, a 5 [place] Yueh pigtail catheter was advanced into the pleural space with trocar technique and pleural fluid was drained. The patient tolerated the procedure well without evidence of complication. A post thoracentesis chest radiograph is forthcoming.    FINDINGS:         Fluid was sent to the laboratory. Specimen 40 mL. No blood loss.    Fluid character: bloody    IMPRESSION:  1. Ultrasound guided left sided diagnostic and therapeutic thoracentesis.    2. 1800 mL of fluid withdrawn.

## 2021-01-15 NOTE — ED NOTES
Pt resting in bed, VSS on RA, placed on 1L for comfort r/t COPD and home O2, GCS 15, waiting from POC from MD, will continue to monitor.

## 2021-01-15 NOTE — ED NOTES
X-ray at bedside, pt resting in bed, NAD, VSS on 1L NC for comfort, GCS 15. Pt aware awaiting POC from pulmonology.

## 2021-01-15 NOTE — DISCHARGE PLANNING
Care Transition Team Assessment  Met with patient for ctt assessment. Lives alone in [address]. Has assist if needed at discharge.  Has a cane and walker at home. Only uses the cane once in awhile when needed.       Information Source  Orientation : Oriented x 4  Information Given By: Patient  Informant's Name: Zachery  Who is responsible for making decisions for patient? : Patient       Interdisciplinary Discharge Planning  Primary Care Physician: Luis Carlos Wall   Lives with - Patient's Self Care Capacity: Alone and Able to Care For Self  Durable Medical Equipment: Walker, Other - Specify(cane)    Discharge Preparedness  What is your plan after discharge?: Uncertain - pending medical team collaboration  What are your discharge supports?: Parent    Finances  Financial Barriers to Discharge: No  Prescription Coverage: Yes

## 2021-01-15 NOTE — H&P
Hospital Medicine History & Physical Note    Date of Service  1/15/2021    Primary Care Physician  Luis Carlos Wall M.D.    Consultants  pulmonary    Code Status  Full Code    Chief Complaint  Chief Complaint   Patient presents with   • Sent by MD     From outpatient CT scan in this ER for increased L pleural effusion since last scan possibly mid december. Pt was in CT for a biopsy of nodules. Procedure was not started. Sent here for possible intervention for effusion. Covid negative three days ago, hx kidney cancer with removal X 1 and COPD, home O2 1-2L baseline, not requiring extra O2 at this time.        History of Presenting Illness  55 y.o. male with past medical history of renal cell carcinoma who presented 1/15/2021 with shortness of breath.  The patient was initiated to have biopsy of her pulmonary nodule and he was found to have new left-sided pleural effusion and the procedure was postponed and he was referred to ER for further evaluation and management.  He has a history of COPD and use 1 to 2 L of oxygen at home.  In the ER pulmonary was consulted and they will follow the patient along during hospitalization.  He will be admitted for further management of his new onset left pleural effusion.    Review of Systems  Review of Systems   Constitutional: Negative for chills, fever and weight loss.   HENT: Negative for congestion and nosebleeds.    Eyes: Negative for blurred vision, pain, discharge and redness.   Respiratory: Positive for shortness of breath. Negative for cough, sputum production and stridor.    Cardiovascular: Negative for chest pain, palpitations and orthopnea.   Gastrointestinal: Negative for abdominal pain, diarrhea, heartburn, nausea and vomiting.   Genitourinary: Negative for dysuria, frequency and urgency.   Musculoskeletal: Negative for back pain, myalgias and neck pain.   Skin: Negative for itching and rash.   Neurological: Negative for dizziness, focal weakness, seizures and headaches.    Psychiatric/Behavioral: Negative for depression. The patient is not nervous/anxious and does not have insomnia.        Past Medical History   has a past medical history of Cancer (HCC) (7/2017), COPD (chronic obstructive pulmonary disease) (HCC), Emphysema of lung (HCC), Hypertension, Pain, Psychiatric problem, and Urinary bladder disorder.    Surgical History   has a past surgical history that includes nephrectomy radical (Right, 8/23/2017).     Family History  Reviewed and no pertinent family history    Social History   reports that he has been smoking cigarettes. He has a 7.50 pack-year smoking history. He has never used smokeless tobacco. He reports current alcohol use. He reports current drug use. Drug: Inhaled.    Allergies  No Known Allergies    Medications  Prior to Admission Medications   Prescriptions Last Dose Informant Patient Reported? Taking?   albuterol 108 (90 Base) MCG/ACT Aero Soln inhalation aerosol FEW DAYS AGO at PRN Patient Yes Yes   Sig: Inhale 1-2 Puffs every four hours as needed for Shortness of Breath.   docusate sodium (COLACE) 100 MG Cap NOT TAKING at NOT TAKING Patient No No   Sig: Take 1 Cap by mouth 2 times a day.   Patient not taking: Reported on 1/15/2021   sertraline (ZOLOFT) 100 MG Tab 1/14/2021 at AM Patient Yes No   Sig: Take 100 mg by mouth every day.      Facility-Administered Medications: None       Physical Exam  Temp:  [36.3 °C (97.3 °F)] 36.3 °C (97.3 °F)  Pulse:  [84-91] 91  Resp:  [22-24] 22  BP: (132-135)/(94-98) 132/98  SpO2:  [94 %-96 %] 96 %    Physical Exam  Vitals signs reviewed.   Constitutional:       General: He is not in acute distress.     Appearance: Normal appearance.   HENT:      Head: Normocephalic and atraumatic.      Nose: No congestion or rhinorrhea.   Eyes:      Extraocular Movements: Extraocular movements intact.      Pupils: Pupils are equal, round, and reactive to light.   Neck:      Musculoskeletal: Normal range of motion and neck supple.    Cardiovascular:      Rate and Rhythm: Normal rate and regular rhythm.      Pulses: Normal pulses.   Pulmonary:      Effort: Pulmonary effort is normal. No respiratory distress.      Comments: Decrease breath sound on the left  Abdominal:      General: Bowel sounds are normal. There is no distension.      Palpations: Abdomen is soft.      Tenderness: There is no abdominal tenderness.   Musculoskeletal:         General: No swelling or tenderness.   Skin:     General: Skin is warm.      Findings: No erythema.   Neurological:      General: No focal deficit present.      Mental Status: He is alert and oriented to person, place, and time.         Laboratory:  Recent Labs     01/15/21  0833   WBC 6.9   RBC 4.55*   HEMOGLOBIN 17.1   HEMATOCRIT 46.9   .1*   MCH 37.6*   MCHC 36.5*   RDW 52.0*   PLATELETCT 224   MPV 8.4*     Recent Labs     01/15/21  0833   SODIUM 127*   POTASSIUM 3.6   CHLORIDE 87*   CO2 20   GLUCOSE 109*   BUN 12   CREATININE 0.82   CALCIUM 9.2     Recent Labs     01/15/21  0833   ALTSGPT 20   ASTSGOT 31   ALKPHOSPHAT 141*   TBILIRUBIN 0.7   GLUCOSE 109*     Recent Labs     01/15/21  0833   APTT 28.9   INR 0.92     No results for input(s): NTPROBNP in the last 72 hours.      No results for input(s): TROPONINT in the last 72 hours.    Imaging:  DX-CHEST-PORTABLE (1 VIEW)   Final Result         1.  Left pleural effusion is identified.      2.  Opacification of volume loss are noted in the left lung base. Collapse of the left lower pulmonary lobe could be present. Endobronchial lesion such as carcinoma is a potential underlying cause. Pneumonia is also possible.      US-THORACENTESIS PUNCTURE LEFT    (Results Pending)         Assessment/Plan:  I anticipate this patient will require at least two midnights for appropriate medical management, necessitating inpatient admission.    Hyponatremia- (present on admission)  Assessment & Plan  Likely related to SIADH from possible underlying malignancy  Follow  CMP in the morning    Pleural effusion on left- (present on admission)  Assessment & Plan  New onset  History of renal cell carcinoma in the past  Concerning for malignancy related  I ordered ultrasound-guided thoracentesis  Pleural fluid analysis: Ordered  Pulmonary following  If positive for malignancy, to consult oncology    Renal carcinoma (HCC)- (present on admission)  Assessment & Plan  History of right nephrectomy

## 2021-01-16 NOTE — ASSESSMENT & PLAN NOTE
Concern for malignancy especially in light of hx of renal ca  Underwent a thoracentesis this am (Was going to have a CT guided bx but then a large pleural effusion was seen)  Will arrange follow up in pulmonary clinic for results Likely Monday pm versus Tuesday  D/w Dr. Farrar

## 2021-01-16 NOTE — ED NOTES
There is a full patient chart/binder left here in the ED that looks like it was for surgery. In patient chart to find where to return it. Called Pre-op/Post-op to let them know we have it here at the [facility] desk.

## 2021-01-18 ENCOUNTER — TELEPHONE (OUTPATIENT)
Dept: SLEEP MEDICINE | Facility: MEDICAL CENTER | Age: 56
End: 2021-01-18

## 2021-01-18 DIAGNOSIS — J90 PLEURAL EFFUSION ON LEFT: ICD-10-CM

## 2021-01-18 NOTE — TELEPHONE ENCOUNTER
Called and spoke with pt. Pt doesn't want to come into [facility]. Pt lives in [place]. Offered pt if he would like to do a virtual appt. Pt declined.  Offered pt a telemedicine appt. Pt agreed.   (pt will be seeing his PCP the end of this week or beginning of next week)    Pended Order [place].       Pt would like to know if you would call him with his results of his Thoracentesis. Pt said he is feeling much better.

## 2021-01-18 NOTE — TELEPHONE ENCOUNTER
Marques Krause M.D.  Pinky Whitley, Med Ass't             That's fine for trena to see    Previous Messages    ----- Message -----   From: Pinky Whitley, Med Ass't   Sent: 1/16/2021   1:04 PM PST   To: Pinky Osborn, Med Ass't, *   Subject: RE: followup virtual                             Hi Dr Matt,   The only provider that has an opening is Trena Cabrera PA-C.  Would it be okay to schedule pt to see her? Or MD?   I can talk to Dr Hussein to see if I can Overbook.   ThanksPinky   ----- Message -----   From: Marques Krause M.D.   Sent: 1/15/2021   4:30 PM PST   To: Pinky Osborn, Med Ass't   Subject: followup virtual                                 Hi   Pt had a thoracentesis on Friday and needs virtual follow up of results in the afternoon if anyone is available I hope   If no openings thendorina scott can we talk to Jovita about adding him on for Tuesday but still call pt as he will be quite nervous.   Thanks   Marques

## 2021-01-19 NOTE — ED NOTES
ED Positive Culture Follow-up/Notification Note:    Date: 1/20/2021     Patient seen in the ED on 1/15/2021 for  tachypnea and shortness of breath. Sent by pulmonology for pleural effusion noted on CT.     1. Pleural effusion on right       Discharge Medication List as of 1/15/2021  3:25 PM          Allergies: Patient has no known allergies.     Vitals:    01/15/21 1300 01/15/21 1400 01/15/21 1500 01/15/21 1501   BP: 131/99 116/85 121/85    Pulse: 85 87  100   Resp: 18 (!) 25  (!) 22   Temp:    36.4 °C (97.6 °F)   TempSrc:    Temporal   SpO2:  92%  94%   Weight:       Height:           Final cultures:   Results     Procedure Component Value Units Date/Time    Fluid Culture W/Gram Stain (pleural fluid) [[id number]]  (Abnormal)  (Susceptibility) Collected: 01/15/21 1210    Order Status: Completed Specimen: Body Fluid from Pleural Fluid Updated: 01/20/21 0957     Significant Indicator POS     Source BF     Site Thoracentesis Fluid     Culture Result Growth noted after further incubation, see below for  organism identification.       Gram Stain Result Few WBCs.  No organisms seen.       Culture Result Staphylococcus epidermidis  Isolated from enrichment broth only, please correlate with  clinical condition.        Staphylococcus saccharolyticus  Isolated from enrichment broth only, please correlate with  clinical condition.      Susceptibility     Staphylococcus epidermidis (1)     Antibiotic Interpretation Microscan Method Status    Amoxicillin/CA [*]  Sensitive <=4/2 mcg/mL OLGA Final    Azithromycin Resistant >4 mcg/mL OLGA Final    Ceftriaxone [*]  Sensitive <=8 mcg/mL OLGA Final    Clindamycin Sensitive <=0.5 mcg/mL OLGA Final    Cefazolin Sensitive <=8 mcg/mL OLGA Final    Ciprofloxacin [*]  Sensitive <=1 mcg/mL OLGA Final    Daptomycin Sensitive <=1 mcg/mL OLGA Final    Erythromycin Resistant >4 mcg/mL OLGA Final    Gentamicin [*]  Sensitive <=4 mcg/mL OLGA Final    Ampicillin/sulbactam Sensitive <=8/4 mcg/mL OLGA Final     "Inducible Clindamycin Test [*]  Negative <=4/0.5 mcg/mL OLGA Final    Vancomycin Sensitive <=0.5 mcg/mL OLGA Final    Levofloxacin [*]  Sensitive <=1 mcg/mL OLGA Final    Linezolid [*]  Sensitive 4 mcg/mL OLGA Final    Meropenem [*]  Sensitive <=4 mcg/mL OLGA Final    Oxacillin Sensitive <=0.25 mcg/mL OLGA Final    Penicillin [*]  Sensitive <=0.03 mcg/mL OLGA Final    Pip/Tazobactam Sensitive <=4 mcg/mL OLGA Final    Rifampin [*]  Sensitive <=1 mcg/mL OLGA Final    Trimeth/Sulfa Sensitive <=0.5/9.5 mcg/mL OLGA Final    Tetracycline Sensitive <=4 mcg/mL OLGA Final           [*]   Suppressed Antibiotic                 Anaerobic Culture (pleural fluid) [[id number]] Collected: 01/15/21 1210    Order Status: Completed Specimen: Body Fluid from Pleural Fluid Updated: 01/20/21 0957     Significant Indicator NEG     Source BF     Site Thoracentesis Fluid     Culture Result No Anaerobes isolated.    GRAM STAIN [[id number]] Collected: 01/15/21 1210    Order Status: Completed Specimen: Body Fluid Updated: 01/15/21 1940     Significant Indicator .     Source BF     Site Thoracentesis Fluid     Gram Stain Result Few WBCs.  No organisms seen.      SARS-CoV-2 PCR (24 hour In-House): Collect NP swab in VTM [[id number]] Collected: 01/15/21 1000    Order Status: Completed Specimen: Respirate Updated: 01/15/21 1609     SARS-CoV-2 Source NP Swab     SARS-CoV-2 by PCR NotDetected     Comment: PATIENTS: Important information regarding your results and instructions can  be found at [email or web address]   \"After your  Covid-19 Test\"  [facility] providers: PLEASE REFER TO DE-ESCALATION AND RETESTING PROTOCOL  on [email or web address]  **The TaqPath COVID-19 SARS-CoV-2 test has been made available for use under  the Emergency Use Authorization (EUA) only.         Narrative:      Have you been in close contact with a person who is suspected  or known to be positive for COVID-19 within the last 30 days  (e.g. last seen that person < 30 " "days ago)->Unknown    SARS-CoV-2, PCR (In-House) [[id number]] Collected: 01/15/21 1000    Order Status: Completed Updated: 01/15/21 1034     SARS-CoV-2 Source Nasal Swab     SARS-CoV-2 (RdRp gene) NotDetected     Comment: PATIENTS: Important information regarding your results and instructions can  be found at [email or web address]   \"After your  Covid-19 Test\"  [facility] providers: PLEASE REFER TO DE-ESCALATION AND RETESTING PROTOCOL  on [email or web address]  **The Abbott ID Now COVID-19 Test has been made available for use under the  Emergency Use Authorization (EUA) only.         Narrative:      Have you been in close contact with a person who is suspected  or known to be positive for COVID-19 within the last 30 days  (e.g. last seen that person < 30 days ago)->Unknown          Plan:   Thoracentesis preformed in ED. Exudative based on fluid studies (Protein ratio 0.8 LDH ratio 1.1). Malignancy vs infection, patient does have hx of renal cancer.   Patient not discharged on any antibiotics, likely contaminate given two different species of coagulase negative staph.   Pulmonology has followed up with patient, they are suggesting it possibly malignant. No changes to therapy at this time.   Cole Bentley, PharmD    "

## 2021-01-20 DIAGNOSIS — J90 PLEURAL EFFUSION ON LEFT: ICD-10-CM

## 2021-01-20 LAB
BACTERIA FLD AEROBE CULT: ABNORMAL
BACTERIA SPEC ANAEROBE CULT: NORMAL
GRAM STN SPEC: ABNORMAL
SIGNIFICANT IND 70042: ABNORMAL
SIGNIFICANT IND 70042: NORMAL
SITE SITE: ABNORMAL
SITE SITE: NORMAL
SOURCE SOURCE: ABNORMAL
SOURCE SOURCE: NORMAL

## 2021-01-20 NOTE — PROGRESS NOTES
Called and updated patient about his left pleural fluid cytology showing atypical cells. Discussed about the possibility of being malignant given his CT chest findings and remote history of renal cell carcinoma. At this time, will obtain repeat CXR and if the left pleural effusion recur then will proceed with repeat thoracentesis to increased the diagnostic yield. If pleural effusion is small then will proceed with Veran navigational bronchoscopy. He verbalized understanding, however, he is unable to come in for a regular office clinic visit or do any form virtual clinic visit but agrees to get all the testing as above.     Kirby Hussein MD   Pulmonary Critical Care   [facility]

## 2021-01-27 ENCOUNTER — HOSPITAL ENCOUNTER (INPATIENT)
Facility: MEDICAL CENTER | Age: 56
LOS: 2 days | DRG: 186 | End: 2021-01-29
Attending: EMERGENCY MEDICINE | Admitting: HOSPITALIST
Payer: MEDICAID

## 2021-01-27 ENCOUNTER — APPOINTMENT (OUTPATIENT)
Dept: RADIOLOGY | Facility: MEDICAL CENTER | Age: 56
DRG: 186 | End: 2021-01-27
Attending: EMERGENCY MEDICINE
Payer: MEDICAID

## 2021-01-27 ENCOUNTER — HOSPITAL ENCOUNTER (OUTPATIENT)
Dept: RADIOLOGY | Facility: MEDICAL CENTER | Age: 56
End: 2021-01-27
Payer: MEDICAID

## 2021-01-27 DIAGNOSIS — R06.02 SHORTNESS OF BREATH: ICD-10-CM

## 2021-01-27 DIAGNOSIS — J90 PLEURAL EFFUSION: ICD-10-CM

## 2021-01-27 DIAGNOSIS — J43.9 PULMONARY EMPHYSEMA, UNSPECIFIED EMPHYSEMA TYPE (HCC): ICD-10-CM

## 2021-01-27 PROBLEM — R91.1 PULMONARY NODULE: Status: ACTIVE | Noted: 2021-01-27

## 2021-01-27 PROBLEM — Z72.0 TOBACCO ABUSE: Status: ACTIVE | Noted: 2021-01-27

## 2021-01-27 PROBLEM — J44.9 COPD (CHRONIC OBSTRUCTIVE PULMONARY DISEASE) (HCC): Status: ACTIVE | Noted: 2021-01-27

## 2021-01-27 LAB
ALBUMIN SERPL BCP-MCNC: 3.7 G/DL (ref 3.2–4.9)
ALBUMIN/GLOB SERPL: 1.3 G/DL
ALP SERPL-CCNC: 168 U/L (ref 30–99)
ALT SERPL-CCNC: 17 U/L (ref 2–50)
AMYLASE FLD-CCNC: 38 U/L
ANION GAP SERPL CALC-SCNC: 21 MMOL/L (ref 7–16)
APPEARANCE FLD: NORMAL
APTT PPP: 28.5 SEC (ref 24.7–36)
AST SERPL-CCNC: 26 U/L (ref 12–45)
BASOPHILS # BLD AUTO: 0.8 % (ref 0–1.8)
BASOPHILS # BLD: 0.07 K/UL (ref 0–0.12)
BILIRUB SERPL-MCNC: 0.6 MG/DL (ref 0.1–1.5)
BLOOD CULTURE HOLD CXBCH: NORMAL
BLOOD CULTURE HOLD CXBCH: NORMAL
BODY FLD TYPE: NORMAL
BUN SERPL-MCNC: 8 MG/DL (ref 8–22)
CALCIUM SERPL-MCNC: 9.1 MG/DL (ref 8.5–10.5)
CHLORIDE SERPL-SCNC: 96 MMOL/L (ref 96–112)
CO2 SERPL-SCNC: 16 MMOL/L (ref 20–33)
COLOR FLD: NORMAL
CREAT SERPL-MCNC: 0.74 MG/DL (ref 0.5–1.4)
CSF COMMENTS 1658: NORMAL
CYTOLOGY REG CYTOL: NORMAL
EKG IMPRESSION: NORMAL
EOSINOPHIL # BLD AUTO: 0.13 K/UL (ref 0–0.51)
EOSINOPHIL NFR BLD: 1.4 % (ref 0–6.9)
ERYTHROCYTE [DISTWIDTH] IN BLOOD BY AUTOMATED COUNT: 54.1 FL (ref 35.9–50)
GLOBULIN SER CALC-MCNC: 2.8 G/DL (ref 1.9–3.5)
GLUCOSE FLD-MCNC: 70 MG/DL
GLUCOSE SERPL-MCNC: 67 MG/DL (ref 65–99)
HCT VFR BLD AUTO: 44.3 % (ref 42–52)
HGB BLD-MCNC: 15.6 G/DL (ref 14–18)
HISTIOCYTES NFR FLD: 23 %
IMM GRANULOCYTES # BLD AUTO: 0.06 K/UL (ref 0–0.11)
IMM GRANULOCYTES NFR BLD AUTO: 0.7 % (ref 0–0.9)
INR PPP: 0.93 (ref 0.87–1.13)
LACTATE BLD-SCNC: 1 MMOL/L (ref 0.5–2)
LDH FLD L TO P-CCNC: 247 U/L
LYMPHOCYTES # BLD AUTO: 0.87 K/UL (ref 1–4.8)
LYMPHOCYTES NFR BLD: 9.5 % (ref 22–41)
LYMPHOCYTES NFR FLD: 46 %
MCH RBC QN AUTO: 38 PG (ref 27–33)
MCHC RBC AUTO-ENTMCNC: 35.2 G/DL (ref 33.7–35.3)
MCV RBC AUTO: 107.8 FL (ref 81.4–97.8)
MESOTHL CELL NFR FLD: 4 %
MONOCYTES # BLD AUTO: 0.81 K/UL (ref 0–0.85)
MONOCYTES NFR BLD AUTO: 8.9 % (ref 0–13.4)
MONONUC CELLS NFR FLD: 24 %
NEUTROPHILS # BLD AUTO: 7.19 K/UL (ref 1.82–7.42)
NEUTROPHILS NFR BLD: 78.7 % (ref 44–72)
NEUTROPHILS NFR FLD: 3 %
NRBC # BLD AUTO: 0 K/UL
NRBC BLD-RTO: 0 /100 WBC
NT-PROBNP SERPL IA-MCNC: 210 PG/ML (ref 0–125)
PH FLD: 7 [PH]
PLATELET # BLD AUTO: 373 K/UL (ref 164–446)
PMV BLD AUTO: 8.4 FL (ref 9–12.9)
POTASSIUM SERPL-SCNC: 4.2 MMOL/L (ref 3.6–5.5)
PROT FLD-MCNC: 4 G/DL
PROT SERPL-MCNC: 6.5 G/DL (ref 6–8.2)
PROTHROMBIN TIME: 12.7 SEC (ref 12–14.6)
RBC # BLD AUTO: 4.11 M/UL (ref 4.7–6.1)
RBC # FLD: NORMAL CELLS/UL
SODIUM SERPL-SCNC: 133 MMOL/L (ref 135–145)
TROPONIN T SERPL-MCNC: 17 NG/L (ref 6–19)
WBC # BLD AUTO: 9.1 K/UL (ref 4.8–10.8)
WBC # FLD: 281 CELLS/UL

## 2021-01-27 PROCEDURE — 93005 ELECTROCARDIOGRAM TRACING: CPT | Performed by: EMERGENCY MEDICINE

## 2021-01-27 PROCEDURE — 83615 LACTATE (LD) (LDH) ENZYME: CPT

## 2021-01-27 PROCEDURE — 83605 ASSAY OF LACTIC ACID: CPT

## 2021-01-27 PROCEDURE — 82945 GLUCOSE OTHER FLUID: CPT

## 2021-01-27 PROCEDURE — 88305 TISSUE EXAM BY PATHOLOGIST: CPT

## 2021-01-27 PROCEDURE — 93005 ELECTROCARDIOGRAM TRACING: CPT

## 2021-01-27 PROCEDURE — 88112 CYTOPATH CELL ENHANCE TECH: CPT

## 2021-01-27 PROCEDURE — 87116 MYCOBACTERIA CULTURE: CPT

## 2021-01-27 PROCEDURE — 80053 COMPREHEN METABOLIC PANEL: CPT

## 2021-01-27 PROCEDURE — 83880 ASSAY OF NATRIURETIC PEPTIDE: CPT

## 2021-01-27 PROCEDURE — 87015 SPECIMEN INFECT AGNT CONCNTJ: CPT

## 2021-01-27 PROCEDURE — 99285 EMERGENCY DEPT VISIT HI MDM: CPT

## 2021-01-27 PROCEDURE — 83986 ASSAY PH BODY FLUID NOS: CPT

## 2021-01-27 PROCEDURE — 84157 ASSAY OF PROTEIN OTHER: CPT

## 2021-01-27 PROCEDURE — 89051 BODY FLUID CELL COUNT: CPT

## 2021-01-27 PROCEDURE — 82150 ASSAY OF AMYLASE: CPT

## 2021-01-27 PROCEDURE — 4410569 US-THORACENTESIS PUNCTURE

## 2021-01-27 PROCEDURE — 87102 FUNGUS ISOLATION CULTURE: CPT

## 2021-01-27 PROCEDURE — 36415 COLL VENOUS BLD VENIPUNCTURE: CPT

## 2021-01-27 PROCEDURE — 85730 THROMBOPLASTIN TIME PARTIAL: CPT

## 2021-01-27 PROCEDURE — 87070 CULTURE OTHR SPECIMN AEROBIC: CPT

## 2021-01-27 PROCEDURE — 770006 HCHG ROOM/CARE - MED/SURG/GYN SEMI*

## 2021-01-27 PROCEDURE — 84484 ASSAY OF TROPONIN QUANT: CPT

## 2021-01-27 PROCEDURE — 87205 SMEAR GRAM STAIN: CPT

## 2021-01-27 PROCEDURE — 85610 PROTHROMBIN TIME: CPT

## 2021-01-27 PROCEDURE — 85025 COMPLETE CBC W/AUTO DIFF WBC: CPT

## 2021-01-27 PROCEDURE — 99223 1ST HOSP IP/OBS HIGH 75: CPT | Performed by: HOSPITALIST

## 2021-01-27 PROCEDURE — 71045 X-RAY EXAM CHEST 1 VIEW: CPT

## 2021-01-27 PROCEDURE — 0W9B3ZZ DRAINAGE OF LEFT PLEURAL CAVITY, PERCUTANEOUS APPROACH: ICD-10-PCS | Performed by: RADIOLOGY

## 2021-01-27 RX ORDER — ONDANSETRON 2 MG/ML
4 INJECTION INTRAMUSCULAR; INTRAVENOUS EVERY 4 HOURS PRN
Status: DISCONTINUED | OUTPATIENT
Start: 2021-01-27 | End: 2021-01-29 | Stop reason: HOSPADM

## 2021-01-27 RX ORDER — MORPHINE SULFATE 4 MG/ML
2 INJECTION, SOLUTION INTRAMUSCULAR; INTRAVENOUS
Status: DISCONTINUED | OUTPATIENT
Start: 2021-01-27 | End: 2021-01-29 | Stop reason: HOSPADM

## 2021-01-27 RX ORDER — ACETAMINOPHEN 325 MG/1
650 TABLET ORAL EVERY 6 HOURS PRN
Status: DISCONTINUED | OUTPATIENT
Start: 2021-01-27 | End: 2021-01-29 | Stop reason: HOSPADM

## 2021-01-27 RX ORDER — SERTRALINE HYDROCHLORIDE 100 MG/1
100 TABLET, FILM COATED ORAL DAILY
Status: DISCONTINUED | OUTPATIENT
Start: 2021-01-28 | End: 2021-01-29 | Stop reason: HOSPADM

## 2021-01-27 RX ORDER — PROMETHAZINE HYDROCHLORIDE 25 MG/1
12.5-25 SUPPOSITORY RECTAL EVERY 4 HOURS PRN
Status: DISCONTINUED | OUTPATIENT
Start: 2021-01-27 | End: 2021-01-29 | Stop reason: HOSPADM

## 2021-01-27 RX ORDER — LEVALBUTEROL TARTRATE 45 UG/1
1-2 AEROSOL, METERED ORAL
COMMUNITY

## 2021-01-27 RX ORDER — AMOXICILLIN 250 MG
2 CAPSULE ORAL 2 TIMES DAILY
Status: DISCONTINUED | OUTPATIENT
Start: 2021-01-28 | End: 2021-01-29 | Stop reason: HOSPADM

## 2021-01-27 RX ORDER — OXYCODONE HYDROCHLORIDE 5 MG/1
2.5 TABLET ORAL
Status: DISCONTINUED | OUTPATIENT
Start: 2021-01-27 | End: 2021-01-29 | Stop reason: HOSPADM

## 2021-01-27 RX ORDER — ONDANSETRON 4 MG/1
4 TABLET, ORALLY DISINTEGRATING ORAL EVERY 4 HOURS PRN
Status: DISCONTINUED | OUTPATIENT
Start: 2021-01-27 | End: 2021-01-29 | Stop reason: HOSPADM

## 2021-01-27 RX ORDER — PROCHLORPERAZINE EDISYLATE 5 MG/ML
5-10 INJECTION INTRAMUSCULAR; INTRAVENOUS EVERY 4 HOURS PRN
Status: DISCONTINUED | OUTPATIENT
Start: 2021-01-27 | End: 2021-01-29 | Stop reason: HOSPADM

## 2021-01-27 RX ORDER — PROMETHAZINE HYDROCHLORIDE 25 MG/1
12.5-25 TABLET ORAL EVERY 4 HOURS PRN
Status: DISCONTINUED | OUTPATIENT
Start: 2021-01-27 | End: 2021-01-29 | Stop reason: HOSPADM

## 2021-01-27 RX ORDER — LEVALBUTEROL TARTRATE 45 UG/1
1-2 AEROSOL, METERED ORAL
Status: DISCONTINUED | OUTPATIENT
Start: 2021-01-27 | End: 2021-01-27

## 2021-01-27 RX ORDER — BISACODYL 10 MG
10 SUPPOSITORY, RECTAL RECTAL
Status: DISCONTINUED | OUTPATIENT
Start: 2021-01-27 | End: 2021-01-29 | Stop reason: HOSPADM

## 2021-01-27 RX ORDER — M-VIT,TX,IRON,MINS/CALC/FOLIC 27MG-0.4MG
1 TABLET ORAL
Status: ON HOLD | COMMUNITY
End: 2021-07-29

## 2021-01-27 RX ORDER — POLYETHYLENE GLYCOL 3350 17 G/17G
1 POWDER, FOR SOLUTION ORAL
Status: DISCONTINUED | OUTPATIENT
Start: 2021-01-27 | End: 2021-01-29 | Stop reason: HOSPADM

## 2021-01-27 RX ORDER — OXYCODONE HYDROCHLORIDE 5 MG/1
5 TABLET ORAL
Status: DISCONTINUED | OUTPATIENT
Start: 2021-01-27 | End: 2021-01-29 | Stop reason: HOSPADM

## 2021-01-27 RX ORDER — ALBUTEROL SULFATE 90 UG/1
2 AEROSOL, METERED RESPIRATORY (INHALATION)
Status: DISCONTINUED | OUTPATIENT
Start: 2021-01-27 | End: 2021-01-29 | Stop reason: HOSPADM

## 2021-01-27 ASSESSMENT — PATIENT HEALTH QUESTIONNAIRE - PHQ9
1. LITTLE INTEREST OR PLEASURE IN DOING THINGS: NOT AT ALL
SUM OF ALL RESPONSES TO PHQ9 QUESTIONS 1 AND 2: 0
2. FEELING DOWN, DEPRESSED, IRRITABLE, OR HOPELESS: NOT AT ALL

## 2021-01-27 ASSESSMENT — LIFESTYLE VARIABLES
ALCOHOL_USE: YES
EVER HAD A DRINK FIRST THING IN THE MORNING TO STEADY YOUR NERVES TO GET RID OF A HANGOVER: NO
ON A TYPICAL DAY WHEN YOU DRINK ALCOHOL HOW MANY DRINKS DO YOU HAVE: 1
AVERAGE NUMBER OF DAYS PER WEEK YOU HAVE A DRINK CONTAINING ALCOHOL: 1
TOTAL SCORE: 0
HAVE YOU EVER FELT YOU SHOULD CUT DOWN ON YOUR DRINKING: NO
HAVE PEOPLE ANNOYED YOU BY CRITICIZING YOUR DRINKING: NO
TOTAL SCORE: 0
HOW MANY TIMES IN THE PAST YEAR HAVE YOU HAD 5 OR MORE DRINKS IN A DAY: 0
TOTAL SCORE: 0
EVER FELT BAD OR GUILTY ABOUT YOUR DRINKING: NO
CONSUMPTION TOTAL: NEGATIVE
DOES PATIENT WANT TO STOP DRINKING: NO

## 2021-01-27 ASSESSMENT — ENCOUNTER SYMPTOMS
ABDOMINAL PAIN: 0
BACK PAIN: 0
NERVOUS/ANXIOUS: 0
PALPITATIONS: 0
SPEECH CHANGE: 0
STRIDOR: 0
MYALGIAS: 0
CHILLS: 0
COUGH: 1
SHORTNESS OF BREATH: 1
SORE THROAT: 0
HEADACHES: 0
NAUSEA: 0
FEVER: 0

## 2021-01-27 ASSESSMENT — FIBROSIS 4 INDEX
FIB4 SCORE: 1.73
FIB4 SCORE: 0.95

## 2021-01-27 ASSESSMENT — COGNITIVE AND FUNCTIONAL STATUS - GENERAL
MOBILITY SCORE: 24
DAILY ACTIVITIY SCORE: 24
SUGGESTED CMS G CODE MODIFIER MOBILITY: CH
SUGGESTED CMS G CODE MODIFIER DAILY ACTIVITY: CH

## 2021-01-27 NOTE — ED PROVIDER NOTES
"ED Provider Note    CHIEF COMPLAINT  Chief Complaint   Patient presents with   • Shortness of Breath     Transferred from [facility] for a worsening recurrent pleural effusion \"2/3 of my Lung is full\"        HPI  Zachery Heath is a 55 y.o. male with a history of renal cancer status post right nephrectomy, hypertension, oxygen dependent COPD, recent development of a large left pleural effusion status post thoracentesis 2 weeks ago, who presents by EMS on transfer from [facility] with increasing shortness of breath over the past week.  The patient was seen at the outside hospital and chest x-ray showed a recurrent left pleural effusion.  Patient was transferred to this facility for further care.  The patient did receive a nebulizer treatment without much improvement.  The says the shortness of breath has been progressively worsening over the past week.  He denies any fever, shaking chills, sore throat, productive cough, vomiting, or diarrhea.    REVIEW OF SYSTEMS  See HPI for further details. All other systems are negative.     PAST MEDICAL HISTORY  Past Medical History:   Diagnosis Date   • Cancer (HCC) 7/2017    right kidney   • COPD (chronic obstructive pulmonary disease) ([facility])     mild   • Emphysema of lung (HCC)    • Hypertension    • Pain     right testicle, bladder   • Psychiatric problem     anxiety w/MRI; claustrophobic   • Urinary bladder disorder     bloody urine due to cancer       FAMILY HISTORY  No family history on file.    SOCIAL HISTORY  Social History     Tobacco Use   • Smoking status: Current Every Day Smoker     Packs/day: 0.25     Years: 30.00     Pack years: 7.50     Types: Cigarettes   • Smokeless tobacco: Never Used   • Tobacco comment: 2 cig/day   Substance Use Topics   • Alcohol use: Yes     Comment: 2-3 per week   • Drug use: Yes     Types: Inhaled     Comment: marijuana- last dose 5 days ago, uses occasionally      Social History     Substance and " "Sexual Activity   Drug Use Yes   • Types: Inhaled    Comment: marijuana- last dose 5 days ago, uses occasionally       SURGICAL HISTORY  Past Surgical History:   Procedure Laterality Date   • NEPHRECTOMY RADICAL Right 8/23/2017    Procedure: NEPHRECTOMY WITH TUMOR THROMBUS RESECTION ;  Surgeon: Héctor Mims M.D.;  Location: SURGERY [facility];  Service:        CURRENT MEDICATIONS  Home Medications     Reviewed by Paris Paulson ([facility]) on 01/27/21 at 1611  Med List Status: Complete    Medication Last Dose Status    docusate sodium (COLACE) 100 MG Cap Not Taking Active    levalbuterol (XOPENEX HFA) 45 MCG/ACT inhaler 1/26/2021 Active    nicotine polacrilex (NICORETTE) 4 MG gum >7 DAYS Active    sertraline (ZOLOFT) 100 MG Tab 1/27/2021 Active    therapeutic multivitamin-minerals (THERAGRAN-M) Tab >14 DAYS Active                ALLERGIES  No Known Allergies    PHYSICAL EXAM0  VITAL SIGNS: Blood Pressure 116/79   Pulse 85   Temperature 36.4 °C (97.6 °F) (Temporal)   Respiration (Abnormal) 23   Height 1.727 m (5' 8\")   Weight 67.6 kg (149 lb)   Oxygen Saturation 92%   Body Mass Index 22.66 kg/m²   Constitutional: Awake, alert, in no acute distress, Non-toxic appearance.  Appears mildly tachypneic, with some increased work of breathing.  HENT: Atraumatic. Bilateral external ears normal, mucous membranes moist, throat nonerythematous without exudates, nose is normal.  Eyes: PERRL, EOMI, conjunctiva moist, noninjected.  Neck: Nontender, Normal range of motion, No nuchal rigidity, No stridor.   Lymphatic: No lymphadenopathy noted.   Cardiovascular: Regular rate and rhythm, no murmurs, rubs, gallops.  Thorax & Lungs:  Good breath sounds on the right, diminished about three quarters up from the bases on the left, with crackles to the left base, no wheezes.  There are intercostal retractions or use of accessory muscles.    Abdomen: Bowel sounds normal, Soft, nontender, nondistended, no rebound, " guarding, masses.  Back: No CVA or spinal tenderness.  Extremities: Intact distal pulses, No edema, No tenderness.   Skin: Warm, Dry, No rashes.   Musculoskeletal: No joint swelling or tenderness.  Neurologic: Alert & oriented x 3, sensory and motor function normal. No focal deficits.   Psychiatric: Affect normal, Judgment normal, Mood normal.     EKG  EKG Interpretation:  Interpreted by me    Rhythm:  Normal sinus rhythm   Rate: 96  Intervals: Normal  Axis: normal  Ectopy: none  Conduction: normal  ST Segments: no evidence of elevation or depression  T Waves: no acute change  Q Waves: none  Clinical Impression: No acute injury or ischemic pattern.   Comparison to previous EKG: None available      LABS  Labs Reviewed   CBC WITH DIFFERENTIAL - Abnormal; Notable for the following components:       Result Value    RBC 4.11 (*)     .8 (*)     MCH 38.0 (*)     RDW 54.1 (*)     MPV 8.4 (*)     Neutrophils-Polys 78.70 (*)     Lymphocytes 9.50 (*)     Lymphs (Absolute) 0.87 (*)     All other components within normal limits    Narrative:     Indicate which anticoagulants the patient is on:->UNKNOWN   COMP METABOLIC PANEL - Abnormal; Notable for the following components:    Sodium 133 (*)     Co2 16 (*)     Anion Gap 21.0 (*)     Alkaline Phosphatase 168 (*)     All other components within normal limits    Narrative:     Indicate which anticoagulants the patient is on:->UNKNOWN   PROBRAIN NATRIURETIC PEPTIDE, NT - Abnormal; Notable for the following components:    NT-proBNP 210 (*)     All other components within normal limits    Narrative:     Indicate which anticoagulants the patient is on:->UNKNOWN   TROPONIN    Narrative:     Indicate which anticoagulants the patient is on:->UNKNOWN   LACTIC ACID   PROTHROMBIN TIME    Narrative:     Indicate which anticoagulants the patient is on:->UNKNOWN   APTT    Narrative:     Indicate which anticoagulants the patient is on:->UNKNOWN   FLUID AMYLASE   FLUID PH   FLUID TOTAL  PROTEIN   FLUID LDH   FLUID GLUCOSE   FLUID CELL COUNT   FLUID CULTURE W/GRAM STAIN   AFB CULTURE   FUNGAL CULTURE   ESTIMATED GFR    Narrative:     Indicate which anticoagulants the patient is on:->UNKNOWN   BLOOD CULTURE,HOLD   CYTOLOGY   BLOOD CULTURE,HOLD   LACTIC ACID   CYTOLOGY       All labs reviewed by me.      RADIOLOGY/PROCEDURES  DX-CHEST-PORTABLE (1 VIEW)   Final Result      1.  Negative for pneumothorax      2.  Left pleural effusion is considerably smaller after removal of 3100 mL of serosanguineous fluid      3.  Left basilar atelectasis      US-THORACENTESIS PUNCTURE LEFT   Final Result      1. Ultrasound guided left sided diagnostic and therapeutic thoracentesis.      2. 3100 mL of fluid withdrawn.      OUTSIDE IMAGES-DX CHEST   Final Result          The radiologist's interpretations of all radiological studies have been reviewed by me.        COURSE & MEDICAL DECISION MAKING  Pertinent Labs & Imaging studies reviewed. (See chart for details)  The patient presents with the above complaints.  On examination he has very diminished breath sounds on the left.  His x-ray from the outside hospital shows a very large left-sided pleural effusion.  He appears to be very symptomatic, with shortness of breath, retractions, and tripoding.  An emergent thoracentesis by interventional radiology was ordered.     CBC showed a white count 9100, hemoglobin 15.6, platelets 373, 79% polys, Chemistry shows sodium 133, CO2 16, anion gap 21, normal renal function and liver function test, lactic acid normal.  Troponin 17, proBNP 210.  INR 0.93.  Dr. Ferreira from radiology performed a ultrasound-guided thoracentesis with removal of 3100 cc of fluid.  Post thoracentesis  x-ray shows no pneumothorax, with the left pleural effusion considerably smaller than previous.    On reexamination the patient was feeling much improved.  Had good breath sounds bilaterally.  Patient will be admitted to the hospital for further observation  and treatment.  Case discussed with Dr. Aguayo.  Critical care time of 35 minutes.    FINAL IMPRESSION  1. Pleural effusion    2. Shortness of breath          Electronically signed by: Giorgi Montoya M.D., 1/27/2021 3:59 PM

## 2021-01-27 NOTE — ED TRIAGE NOTES
".  Chief Complaint   Patient presents with   • Shortness of Breath     Transferred from [facility] for a worsening recurrent pleural effusion \"2/3 of my Lung is full\"        ./100   Pulse 98   Temp 36.4 °C (97.6 °F) (Temporal)   Resp (!) 39   Ht 1.727 m (5' 8\")   Wt 67.6 kg (149 lb)   SpO2 93%   BMI 22.66 kg/m²     BIBA from [facility] for worsening pleural effusion. \"my R lung is 2/3 full\". Hx COPD on home 02 and 2L. +SOB. Denies fever/chills/sore throat/cp/cough/ha/body aches/other covid related sx\". PT w/ increased WOB. Speaks 3 -4 word sentences w/ moderate respiratory difficulty. No drooling. No obstruction. + tripoding. +pursed lip breathing. + tachypnea. Sating at 92% on 5L NC. Lung sounds: diminished throughout, greatly decreased to lower lobes. Skin warm, dry, skin color normal. Pending MD rivera. Afebrile   "

## 2021-01-28 ENCOUNTER — APPOINTMENT (OUTPATIENT)
Dept: RADIOLOGY | Facility: MEDICAL CENTER | Age: 56
DRG: 186 | End: 2021-01-28
Attending: HOSPITALIST
Payer: MEDICAID

## 2021-01-28 PROBLEM — E87.29 INCREASED ANION GAP METABOLIC ACIDOSIS: Status: ACTIVE | Noted: 2021-01-28

## 2021-01-28 LAB
ANION GAP SERPL CALC-SCNC: 12 MMOL/L (ref 7–16)
ANION GAP SERPL CALC-SCNC: 26 MMOL/L (ref 7–16)
BUN SERPL-MCNC: 8 MG/DL (ref 8–22)
BUN SERPL-MCNC: 8 MG/DL (ref 8–22)
CALCIUM SERPL-MCNC: 8.5 MG/DL (ref 8.5–10.5)
CALCIUM SERPL-MCNC: 8.8 MG/DL (ref 8.5–10.5)
CHLORIDE SERPL-SCNC: 90 MMOL/L (ref 96–112)
CHLORIDE SERPL-SCNC: 95 MMOL/L (ref 96–112)
CO2 SERPL-SCNC: 13 MMOL/L (ref 20–33)
CO2 SERPL-SCNC: 24 MMOL/L (ref 20–33)
CREAT SERPL-MCNC: 0.74 MG/DL (ref 0.5–1.4)
CREAT SERPL-MCNC: 0.82 MG/DL (ref 0.5–1.4)
CYTOLOGY REG CYTOL: NORMAL
ERYTHROCYTE [DISTWIDTH] IN BLOOD BY AUTOMATED COUNT: 52.9 FL (ref 35.9–50)
GLUCOSE SERPL-MCNC: 101 MG/DL (ref 65–99)
GLUCOSE SERPL-MCNC: 70 MG/DL (ref 65–99)
GRAM STN SPEC: NORMAL
HCT VFR BLD AUTO: 42.5 % (ref 42–52)
HGB BLD-MCNC: 15.6 G/DL (ref 14–18)
LACTATE BLD-SCNC: 1 MMOL/L (ref 0.5–2)
LACTATE BLD-SCNC: 1 MMOL/L (ref 0.5–2)
LDH SERPL L TO P-CCNC: 134 U/L (ref 107–266)
MCH RBC QN AUTO: 38.9 PG (ref 27–33)
MCHC RBC AUTO-ENTMCNC: 36.7 G/DL (ref 33.7–35.3)
MCV RBC AUTO: 106 FL (ref 81.4–97.8)
PLATELET # BLD AUTO: 311 K/UL (ref 164–446)
PMV BLD AUTO: 8.2 FL (ref 9–12.9)
POTASSIUM SERPL-SCNC: 3.9 MMOL/L (ref 3.6–5.5)
POTASSIUM SERPL-SCNC: 4 MMOL/L (ref 3.6–5.5)
RBC # BLD AUTO: 4.01 M/UL (ref 4.7–6.1)
SIGNIFICANT IND 70042: NORMAL
SITE SITE: NORMAL
SODIUM SERPL-SCNC: 126 MMOL/L (ref 135–145)
SODIUM SERPL-SCNC: 134 MMOL/L (ref 135–145)
SOURCE SOURCE: NORMAL
WBC # BLD AUTO: 10.7 K/UL (ref 4.8–10.8)

## 2021-01-28 PROCEDURE — A9270 NON-COVERED ITEM OR SERVICE: HCPCS | Performed by: HOSPITALIST

## 2021-01-28 PROCEDURE — 770006 HCHG ROOM/CARE - MED/SURG/GYN SEMI*

## 2021-01-28 PROCEDURE — 36415 COLL VENOUS BLD VENIPUNCTURE: CPT

## 2021-01-28 PROCEDURE — 99232 SBSQ HOSP IP/OBS MODERATE 35: CPT | Performed by: HOSPITALIST

## 2021-01-28 PROCEDURE — 83605 ASSAY OF LACTIC ACID: CPT | Mod: 91

## 2021-01-28 PROCEDURE — 83615 LACTATE (LD) (LDH) ENZYME: CPT

## 2021-01-28 PROCEDURE — 80048 BASIC METABOLIC PNL TOTAL CA: CPT

## 2021-01-28 PROCEDURE — 700102 HCHG RX REV CODE 250 W/ 637 OVERRIDE(OP): Performed by: HOSPITALIST

## 2021-01-28 PROCEDURE — 85027 COMPLETE CBC AUTOMATED: CPT

## 2021-01-28 PROCEDURE — 71045 X-RAY EXAM CHEST 1 VIEW: CPT

## 2021-01-28 RX ADMIN — SERTRALINE HYDROCHLORIDE 100 MG: 100 TABLET ORAL at 08:26

## 2021-01-28 ASSESSMENT — ENCOUNTER SYMPTOMS
PALPITATIONS: 0
COUGH: 0
FALLS: 0
NAUSEA: 0
ABDOMINAL PAIN: 0
NERVOUS/ANXIOUS: 0
FOCAL WEAKNESS: 0
WEIGHT LOSS: 1
INSOMNIA: 0
CHILLS: 0
DIAPHORESIS: 0
VOMITING: 0
DIZZINESS: 0
HEADACHES: 0
SPEECH CHANGE: 0
PSYCHIATRIC NEGATIVE: 1
FEVER: 0
SHORTNESS OF BREATH: 1

## 2021-01-28 NOTE — DISCHARGE PLANNING
Care Transition Team Assessment    Pt was recently admitted. Assessment copied from 1/15/21.   Met with patient for ctt assessment. Lives alone in [address]. Has assist if needed at discharge.  Has a cane and walker at home. Only uses the cane once in awhile when needed.   Information Source  Orientation : Oriented x 4  Information Given By: Patient  Informant's Name: Zachery  Who is responsible for making decisions for patient? : Patient     Interdisciplinary Discharge Planning  Primary Care Physician: Luis Carlos Wall   Lives with - Patient's Self Care Capacity: Alone and Able to Care For Self  Durable Medical Equipment: Walker, Other - Specify(cane)     Discharge Preparedness  What is your plan after discharge?: Uncertain - pending medical team collaboration  What are your discharge supports?: Parent     Finances  Financial Barriers to Discharge: No  Prescription Coverage: Yes     Elopement Risk  Legal Hold: No  Ambulatory or Self Mobile in Wheelchair: No-Not an Elopement Risk    Domestic Abuse  Have you ever been the victim of abuse or violence?: No

## 2021-01-28 NOTE — RESPIRATORY CARE
COPD EDUCATION by COPD CLINICAL EDUCATOR  1/28/2021 at 3:02 PM by Laura Osullivan, RRT     Patient interviewed by COPD education team. Patient refused COPD program at this time.

## 2021-01-28 NOTE — PROGRESS NOTES
Ultrasound arrived at [place] [id number] to assist with a diagnostic ultrasound guided thoracentesis of the LEFT chest. Dr. Ferreira consented the patient and a time out was performed. Dr. Ferreira performed the procedure and vitals were monitored by the ER throughout. 3100 mL were removed and 1230 mL were sent to the lab. A post thoracentesis chest x-ray was ordered. The patient tolerated the procedure was in a stable condition at the conclusion.

## 2021-01-28 NOTE — PROGRESS NOTES
[facility] Medicine Daily Progress Note    Date of Service  1/28/2021    Chief Complaint  55 y.o. male admitted 1/27/2021 with recurrent pleural effusion and associated SOB.     Hospital Course  No notes on file    Interval Problem Update  Feels improved after 3L fluid have been removed. No overnight events. Oxygen requirements down to 4L NC today.     Consultants/Specialty  None    Code Status  Full Code    Disposition  Anticipate home in 1-2 days.     Review of Systems  Review of Systems   Constitutional: Positive for malaise/fatigue and weight loss. Negative for chills, diaphoresis and fever.   Respiratory: Positive for shortness of breath. Negative for cough.    Cardiovascular: Negative for chest pain, palpitations and leg swelling.   Gastrointestinal: Negative for abdominal pain, nausea and vomiting.   Genitourinary: Negative for dysuria.   Musculoskeletal: Negative for falls.   Neurological: Negative for dizziness, speech change, focal weakness and headaches.   Psychiatric/Behavioral: Negative.  The patient is not nervous/anxious and does not have insomnia.    All other systems reviewed and are negative.       Physical Exam  Temp:  [36.4 °C (97.5 °F)-37.1 °C (98.7 °F)] 36.4 °C (97.5 °F)  Pulse:  [] 84  Resp:  [18-62] 18  BP: (112-151)/() 119/70  SpO2:  [91 %-96 %] 91 %    Physical Exam  Vitals signs reviewed.   Constitutional:       General: He is not in acute distress.     Appearance: He is not diaphoretic.      Interventions: Nasal cannula in place.      Comments: Chronically ill appearance   HENT:      Head: Normocephalic.      Nose: No congestion.      Mouth/Throat:      Mouth: Mucous membranes are moist.   Eyes:      General: No scleral icterus.     Extraocular Movements: Extraocular movements intact.   Neck:      Musculoskeletal: Normal range of motion. No neck rigidity.   Cardiovascular:      Rate and Rhythm: Normal rate and regular rhythm.      Pulses: Normal pulses.   Pulmonary:      Effort:  Pulmonary effort is normal. No respiratory distress.      Breath sounds: Rales present. No wheezing.   Abdominal:      General: There is no distension.      Palpations: Abdomen is soft.      Tenderness: There is no abdominal tenderness. There is no guarding.   Musculoskeletal:         General: No tenderness.      Right lower leg: No edema.      Left lower leg: No edema.   Skin:     General: Skin is dry.      Coloration: Skin is not jaundiced.   Neurological:      General: No focal deficit present.      Mental Status: He is alert and oriented to person, place, and time.   Psychiatric:         Mood and Affect: Mood normal.         Speech: Speech is delayed.         Behavior: Behavior normal. Behavior is cooperative.         Fluids  No intake or output data in the 24 hours ending 01/28/21 0743    Laboratory  Recent Labs     01/27/21  1537 01/28/21  0000   WBC 9.1 10.7   RBC 4.11* 4.01*   HEMOGLOBIN 15.6 15.6   HEMATOCRIT 44.3 42.5   .8* 106.0*   MCH 38.0* 38.9*   MCHC 35.2 36.7*   RDW 54.1* 52.9*   PLATELETCT 373 311   MPV 8.4* 8.2*     Recent Labs     01/27/21  1537 01/28/21  0000   SODIUM 133* 134*   POTASSIUM 4.2 4.0   CHLORIDE 96 95*   CO2 16* 13*   GLUCOSE 67 70   BUN 8 8   CREATININE 0.74 0.74   CALCIUM 9.1 8.8     Recent Labs     01/27/21  1537   APTT 28.5   INR 0.93               Imaging  DX-CHEST-PORTABLE (1 VIEW)   Final Result      1.  Negative for pneumothorax      2.  Left pleural effusion is considerably smaller after removal of 3100 mL of serosanguineous fluid      3.  Left basilar atelectasis      US-THORACENTESIS PUNCTURE LEFT   Final Result      1. Ultrasound guided left sided diagnostic and therapeutic thoracentesis.      2. 3100 mL of fluid withdrawn.      OUTSIDE IMAGES-DX CHEST   Final Result      DX-CHEST-LIMITED (1 VIEW)    (Results Pending)        Assessment/Plan  * Recurrent pleural effusion on left- (present on admission)  Assessment & Plan  Discussed with patient and mother that he  may still need lung nodule biopsy if path is inconclusive.  - thora from past admission showed atypical cells  - s/p 3.1L removed on   - repeat CXR pending    Increased anion gap metabolic acidosis- (present on admission)  Assessment & Plan  Unknown etiology. Blood sugar is normal.   - urinalysis for ketones pending  - urine drug screen pending  - LDH pending    Renal carcinoma (HCC)- (present on admission)  Assessment & Plan  S/P nephrectomy  - outpatient follow up with urology   - follow up throa fluid pathology    Pulmonary nodule- (present on admission)  Assessment & Plan  Current tobacco abuse and h/o renal cell carcinoma s/p resection   - had biopsy planned but reports he didn't tolerate procedure 2/2 recurrent effusion  - follow up pathology from thoracentesis    COPD (chronic obstructive pulmonary disease) (HCC)  Assessment & Plan  No acute exacerbation. With chronic respiratory failure.   - Smoking cessation encouraged  - RT per protocol  - mother reports he uses her  's concentrator  - ambulatory O2 saturation study pending; will need to set up home O2    Hyponatremia- (present on admission)  Assessment & Plan  2/2 malignancy?   - repeat tomorrow    Tobacco abuse- (present on admission)  Assessment & Plan  - Nicotine gum       VTE prophylaxis: SCDs

## 2021-01-28 NOTE — PROGRESS NOTES
Patient transferred to unit with belongings. Oxygen concentrator with patient and at bedside. Patient with home medications to be sent down to pharmacy. Patient alert x4. No complaints of pain at this time. Patient continues to be on 4L O2.  in place. Discussed plan of care with patient. No other needs at this time. Educated Patient on the use of call light. Bed alarm in use. Hourly rounding in place.

## 2021-01-28 NOTE — ASSESSMENT & PLAN NOTE
No acute exacerbation. With chronic respiratory failure.   - Smoking cessation encouraged  - RT per protocol  - mother reports he uses her  's concentrator  - ambulatory O2 saturation study pending; will need to set up home O2

## 2021-01-28 NOTE — CARE PLAN
Problem: Communication  Goal: The ability to communicate needs accurately and effectively will improve  Outcome: PROGRESSING AS EXPECTED  Pt alert x4. Pt able to communicate needs to RN. Discussed plan of care with pt.      Problem: Respiratory:  Goal: Respiratory status will improve  Outcome: PROGRESSING AS EXPECTED   Pt continues to be on 4L oxygen. Oxygen saturation greater than 90%.  in place.

## 2021-01-28 NOTE — ASSESSMENT & PLAN NOTE
Current tobacco abuse and h/o renal cell carcinoma s/p resection   - had biopsy planned but reports he didn't tolerate procedure 2/2 recurrent effusion  - follow up pathology from thoracentesis

## 2021-01-28 NOTE — PROGRESS NOTES
· 2 RN skin check complete with Rj RN.  · Devices in place PIV. Nasal cannula.  · Skin assessed under devices yes.  · Confirmed pressure ulcers found on N/A.  · New potential pressure ulcers noted on N/A. Wound consult placed? N/A. Photo uploaded? N/A.   · The following interventions are in place patient able to position self independently in bed. Pillows in use for support and positioning. Stoner foams in use for oxygen tubing.     Skin intact throughout. Thoracentesis site present on left side of back with bandage over site. No other open wounds noted upon assessment.

## 2021-01-28 NOTE — PROGRESS NOTES
Assumed care of pt at 0700 from the night shift nurse. Pt is A&Ox4, he denies pain or discomfort at this time. He declines any other needs as well. Hourly rounding is in place.

## 2021-01-28 NOTE — H&P
"Hospital Medicine History & Physical Note    Date of Service  2021    Primary Care Physician  Luis Carlos Wall M.D.    Consultants  None    Code Status  Full Code    Chief Complaint  Chief Complaint   Patient presents with   • Shortness of Breath     Transferred from [facility] for a worsening recurrent pleural effusion \"2/3 of my Lung is full\"        History of Presenting Illness  55 y.o. male tobacco smoker (trying to quit) with a hx of renal cell cancer and prior nephrectomy, COPD, htn who presented 2021 with recurrent pleural effusion.  Earlier this month he was here for pulmonary nodule biopsy but required a left sided thoracentesis on 1/15.  He went home and went to see his primary and was complaining of worsening shortness of breath and was found to have recurrent effusion and sent to [facility]. His prior pleural path was inconclusive but had atypical cells.    Review of Systems  Review of Systems   Constitutional: Negative for chills and fever.        Night sweats   HENT: Negative for sore throat.    Respiratory: Positive for cough and shortness of breath. Negative for stridor.    Cardiovascular: Negative for chest pain, palpitations and leg swelling.   Gastrointestinal: Negative for abdominal pain and nausea.   Genitourinary: Negative for dysuria.   Musculoskeletal: Negative for back pain and myalgias.   Neurological: Negative for speech change and headaches.   Psychiatric/Behavioral: The patient is not nervous/anxious.        Past Medical History   has a past medical history of Cancer (HCC) (2017), COPD (chronic obstructive pulmonary disease) ([facility]), Emphysema of lung ([facility]), Hypertension, Pain, Psychiatric problem, and Urinary bladder disorder.    Surgical History   has a past surgical history that includes nephrectomy radical (Right, 2017).     Family History  Father   Mother alive     Social History   reports that he has been smoking cigarettes. He has a 7.50 pack-year " smoking history. He has never used smokeless tobacco. He reports current alcohol use. He reports current drug use. Drug: Inhaled. .  Has two grown children.  Lives alone     Allergies  No Known Allergies    Medications  Prior to Admission Medications   Prescriptions Last Dose Informant Patient Reported? Taking?   docusate sodium (COLACE) 100 MG Cap Not Taking at Unknown time Patient No No   Sig: Take 1 Cap by mouth 2 times a day.   Patient not taking: Reported on 1/27/2021   levalbuterol (XOPENEX HFA) 45 MCG/ACT inhaler 1/26/2021 at AM Patient Yes Yes   Sig: Inhale 1-2 Puffs every morning as needed for Shortness of Breath.   nicotine polacrilex (NICORETTE) 4 MG gum >7 DAYS at UNKNOWN Patient Yes Yes   Sig: Take 4 mg by mouth as needed.   sertraline (ZOLOFT) 100 MG Tab 1/27/2021 at 0700 Patient Yes No   Sig: Take 100 mg by mouth every day.   therapeutic multivitamin-minerals (THERAGRAN-M) Tab >14 DAYS at UNKNOWN Patient Yes Yes   Sig: Take 1 Tab by mouth every day.      Facility-Administered Medications: None       Physical Exam  Temp:  [36.4 °C (97.6 °F)] 36.4 °C (97.6 °F)  Pulse:  [] 85  Resp:  [23-62] 23  BP: (116-151)/() 116/79  SpO2:  [91 %-96 %] 92 %    Physical Exam  Vitals signs reviewed.   Constitutional:       Appearance: Normal appearance. He is not diaphoretic.   HENT:      Head: Normocephalic and atraumatic.      Nose: Nose normal.      Mouth/Throat:      Mouth: Mucous membranes are moist.      Pharynx: No oropharyngeal exudate.   Eyes:      General: No scleral icterus.        Right eye: No discharge.         Left eye: No discharge.      Extraocular Movements: Extraocular movements intact.      Conjunctiva/sclera: Conjunctivae normal.   Neck:      Musculoskeletal: No muscular tenderness.   Cardiovascular:      Rate and Rhythm: Normal rate and regular rhythm.      Pulses:           Radial pulses are 2+ on the right side and 2+ on the left side.        Dorsalis pedis pulses are 2+ on  the right side and 2+ on the left side.      Heart sounds: No murmur.   Pulmonary:      Effort: Pulmonary effort is normal. No respiratory distress.      Breath sounds: Examination of the left-lower field reveals decreased breath sounds. Decreased breath sounds present. No wheezing or rales.   Abdominal:      General: Bowel sounds are normal. There is no distension.      Palpations: Abdomen is soft.   Musculoskeletal:         General: No swelling or tenderness.   Lymphadenopathy:      Cervical: No cervical adenopathy.   Skin:     Coloration: Skin is not jaundiced or pale.   Neurological:      General: No focal deficit present.      Mental Status: He is alert and oriented to person, place, and time. Mental status is at baseline.      Cranial Nerves: No cranial nerve deficit.   Psychiatric:         Mood and Affect: Mood normal.         Behavior: Behavior normal.         Laboratory:  Recent Labs     01/27/21  1537   WBC 9.1   RBC 4.11*   HEMOGLOBIN 15.6   HEMATOCRIT 44.3   .8*   MCH 38.0*   MCHC 35.2   RDW 54.1*   PLATELETCT 373   MPV 8.4*     Recent Labs     01/27/21  1537   SODIUM 133*   POTASSIUM 4.2   CHLORIDE 96   CO2 16*   GLUCOSE 67   BUN 8   CREATININE 0.74   CALCIUM 9.1     Recent Labs     01/27/21  1537   ALTSGPT 17   ASTSGOT 26   ALKPHOSPHAT 168*   TBILIRUBIN 0.6   GLUCOSE 67     Recent Labs     01/27/21  1537   APTT 28.5   INR 0.93     Recent Labs     01/27/21  1537   NTPROBNP 210*         Recent Labs     01/27/21  1537   TROPONINT 17       Imaging:  DX-CHEST-PORTABLE (1 VIEW)   Final Result      1.  Negative for pneumothorax      2.  Left pleural effusion is considerably smaller after removal of 3100 mL of serosanguineous fluid      3.  Left basilar atelectasis      US-THORACENTESIS PUNCTURE LEFT   Final Result      1. Ultrasound guided left sided diagnostic and therapeutic thoracentesis.      2. 3100 mL of fluid withdrawn.      OUTSIDE IMAGES-DX CHEST   Final Result            Assessment/Plan:  I  anticipate this patient will require at least two midnights for appropriate medical management, necessitating inpatient admission.    * Recurrent pleural effusion on left  Assessment & Plan  3.1 L removed via thoracentesis in ER  Labs sent  Recent path from 1/15/21 Pleural Fluid:     Rare atypical cells present.          The ThinPrep and cell block slides demonstrate mesothelial           cells, histiocytes, small lymphocytes and a few neutrophils.           Rare atypical mononuclear cells demonstrate small central           nucleoli. Rare atypical cells demonstrate weak positivity for           PAX-8.          See comment.     Comment: The patient has a history of a renal cell carcinoma clear cell   type, WHO/ISUP grade 2. There are rare atypical cells demonstrating   weak nuclear staining for PAX-8. The paucity of the atypical cells   precludes a definitive diagnosis to rule out malignancy. Suggest   sending any follow-up pleural effusions for cytologic evaluation.   Pertinent slides are reviewed with Dr. Dunaway with agreement on the   interpretation    Renal carcinoma (HCC)- (present on admission)  Assessment & Plan  S/P nephrectomy  Follows with urology   Recent CT scan showed pulmonary nodules and he was to have biopsy but then was discovered to have large pleural effusion earlier this month.    Pulmonary nodule  Assessment & Plan  History of smoking cigarets, hx of renal cell CA  Has recurrent pleural effusion.   Future evaluation if pleural effusion path inconclusive.    Hyponatremia- (present on admission)  Assessment & Plan  Monitor BMP  SIADH vs other etiology.  Pleural effusion but appears euvolemic to slightly hypovolemic    COPD (chronic obstructive pulmonary disease) (HCC)  Assessment & Plan  No acute exacerbation  Smoking cessation encouraged  RT per protocol  Supplemental oxygen (already set up at home for past year)    Tobacco abuse  Assessment & Plan  Nicotine gum

## 2021-01-28 NOTE — PROGRESS NOTES
Spoke to patient's mother Aury on the phone. Updated her on the plan of care and answered any questions she had.

## 2021-01-28 NOTE — CARE PLAN
Problem: Safety  Goal: Will remain free from falls  Outcome: PROGRESSING AS EXPECTED  Pt is encouraged to call, he has treaded socks on, and bedside commitment is being implemented.     Problem: Respiratory:  Goal: Respiratory status will improve  Outcome: PROGRESSING AS EXPECTED   Pt is in for a pleural effusion, he is encouraged to cough, head of bed is raised greater that 30 degrees, and he receives breathing treatment.

## 2021-01-28 NOTE — ASSESSMENT & PLAN NOTE
Discussed with patient and mother that he may still need lung nodule biopsy if path is inconclusive.  - thora from past admission showed atypical cells  - s/p 3.1L removed on 1/27  - repeat CXR pending

## 2021-01-29 VITALS
TEMPERATURE: 97.8 F | OXYGEN SATURATION: 94 % | SYSTOLIC BLOOD PRESSURE: 108 MMHG | HEART RATE: 83 BPM | BODY MASS INDEX: 19.98 KG/M2 | WEIGHT: 131.84 LBS | HEIGHT: 68 IN | DIASTOLIC BLOOD PRESSURE: 77 MMHG | RESPIRATION RATE: 18 BRPM

## 2021-01-29 PROBLEM — E87.29 INCREASED ANION GAP METABOLIC ACIDOSIS: Status: RESOLVED | Noted: 2021-01-28 | Resolved: 2021-01-29

## 2021-01-29 LAB
AMPHET UR QL SCN: NEGATIVE
APPEARANCE UR: CLEAR
BARBITURATES UR QL SCN: NEGATIVE
BENZODIAZ UR QL SCN: NEGATIVE
BILIRUB UR QL STRIP.AUTO: NEGATIVE
BZE UR QL SCN: NEGATIVE
CANNABINOIDS UR QL SCN: POSITIVE
COLOR UR: YELLOW
GLUCOSE UR STRIP.AUTO-MCNC: NEGATIVE MG/DL
KETONES UR STRIP.AUTO-MCNC: 15 MG/DL
LEUKOCYTE ESTERASE UR QL STRIP.AUTO: NEGATIVE
METHADONE UR QL SCN: NEGATIVE
MICRO URNS: ABNORMAL
NITRITE UR QL STRIP.AUTO: NEGATIVE
OPIATES UR QL SCN: NEGATIVE
OXYCODONE UR QL SCN: NEGATIVE
PCP UR QL SCN: NEGATIVE
PH UR STRIP.AUTO: 6 [PH] (ref 5–8)
PROPOXYPH UR QL SCN: NEGATIVE
PROT UR QL STRIP: NEGATIVE MG/DL
RBC UR QL AUTO: NEGATIVE
SP GR UR STRIP.AUTO: 1.01
UROBILINOGEN UR STRIP.AUTO-MCNC: 0.2 MG/DL

## 2021-01-29 PROCEDURE — 99239 HOSP IP/OBS DSCHRG MGMT >30: CPT | Performed by: HOSPITALIST

## 2021-01-29 PROCEDURE — 700102 HCHG RX REV CODE 250 W/ 637 OVERRIDE(OP): Performed by: HOSPITALIST

## 2021-01-29 PROCEDURE — 80307 DRUG TEST PRSMV CHEM ANLYZR: CPT

## 2021-01-29 PROCEDURE — A9270 NON-COVERED ITEM OR SERVICE: HCPCS | Performed by: HOSPITALIST

## 2021-01-29 PROCEDURE — 81003 URINALYSIS AUTO W/O SCOPE: CPT

## 2021-01-29 RX ADMIN — SERTRALINE HYDROCHLORIDE 100 MG: 100 TABLET ORAL at 05:27

## 2021-01-29 RX ADMIN — NICOTINE POLACRILEX 2 MG: 2 GUM, CHEWING BUCCAL at 07:38

## 2021-01-29 ASSESSMENT — PAIN DESCRIPTION - PAIN TYPE: TYPE: ACUTE PAIN

## 2021-01-29 NOTE — PROGRESS NOTES
AVS reviewed comprehensively with patient. Denied any questions related to follow up appointments and oxygen supply obtainment. Stated he has adequate O2 to get home and follow up with [facility] himself.

## 2021-01-29 NOTE — DISCHARGE INSTRUCTIONS
Discharge Instructions    Discharged to home by car with relative. Discharged via wheelchair, hospital escort: Yes.  Special equipment needed: Oxygen    Be sure to schedule a follow-up appointment with your primary care doctor or any specialists as instructed.     Discharge Plan:   Influenza Vaccine Indication: Not indicated: Previously immunized this influenza season and > 8 years of age    I understand that a diet low in cholesterol, fat, and sodium is recommended for good health. Unless I have been given specific instructions below for another diet, I accept this instruction as my diet prescription.   Other diet: N/A    Special Instructions: None    · Is patient discharged on Warfarin / Coumadin?   No     Depression / Suicide Risk    As you are discharged from this [facility] facility, it is important to learn how to keep safe from harming yourself.    Recognize the warning signs:  · Abrupt changes in personality, positive or negative- including increase in energy   · Giving away possessions  · Change in eating patterns- significant weight changes-  positive or negative  · Change in sleeping patterns- unable to sleep or sleeping all the time   · Unwillingness or inability to communicate  · Depression  · Unusual sadness, discouragement and loneliness  · Talk of wanting to die  · Neglect of personal appearance   · Rebelliousness- reckless behavior  · Withdrawal from people/activities they love  · Confusion- inability to concentrate     If you or a loved one observes any of these behaviors or has concerns about self-harm, here's what you can do:  · Talk about it- your feelings and reasons for harming yourself  · Remove any means that you might use to hurt yourself (examples: pills, rope, extension cords, firearm)  · Get professional help from the community (Mental Health, Substance Abuse, psychological counseling)  · Do not be alone:Call your Safe Contact- someone whom you trust who will be there for you.  · Call  your local CRISIS HOTLINE 166-4549 or 185-039-4876  · Call your local Children's Mobile Crisis Response Team [place] (169) 584-4755 or www.URX  · Call the toll free National Suicide Prevention Hotlines   · National Suicide Prevention Lifeline 321-622-AXQP (0819)  · [place] Line Network 800-SUICIDE (629-3925)      Discharge Instructions per Shonna Flores M.D.    Please follow up with your PCP. You may still need a lung nodule biopsy if the fluid pathology from the thoracentesis is inconclusive.     ACTIVITY: as tolerated    DIAGNOSIS: recurrent pleural effusions    Return to ER if you develop new or worsening symptoms

## 2021-01-29 NOTE — DISCHARGE SUMMARY
"Discharge Summary    CHIEF COMPLAINT ON ADMISSION  Chief Complaint   Patient presents with   • Shortness of Breath     Transferred from [facility] for a worsening recurrent pleural effusion \"2/3 of my Lung is full\"        Reason for Admission  ems     Admission Date  1/27/2021    CODE STATUS  Full Code    HPI & HOSPITAL COURSE  This is a 55 y.o. male tobacco user with COPD with chronic respiratory failure, h/o RCC s/p nephrectomy and HTN here with recurrent pleural effusion and acute on chronic respiratory failure. He was recently here and had a left sided thoracentesis on 1/15 with 1.8L removed and he was discharged home for outpatient lung biopsy. He was at [facility] and was going to have the lung biopsy when he was found to have a large right sided pleural effusion so he was sent here for higher level of care. He had 3.1L removed without issue. His oxygen requirements improved back to his baseline 3L NC. His ambulatory O2 saturation was updated with his company, [employer], and since he had oxygen with him, he preferred to follow up with the company himself.     Therefore, he is discharged in fair and stable condition to home with close outpatient follow-up.    The patient met 2-midnight criteria for an inpatient stay at the time of discharge.    Discharge Date  1/29/2021    FOLLOW UP ITEMS POST DISCHARGE  Please follow up with your PCP and the thoracentesis pathology results. Initial thoracentesis from 1/25 showed atypical cells but was inconclusive. He may benefit from scheduled outpatient thoras to help with his symptoms.     DISCHARGE DIAGNOSES  Principal Problem:    Recurrent pleural effusion on left POA: Yes  Active Problems:    Renal carcinoma (HCC) POA: Yes    Hyponatremia POA: Yes    COPD (chronic obstructive pulmonary disease) (HCC) POA: Unknown    Pulmonary nodule POA: Yes    Tobacco abuse POA: Yes  Resolved Problems:    Increased anion gap metabolic acidosis POA: Yes      FOLLOW " UP  Future Appointments   Date Time Provider Department Center   3/15/2021 11:20 AM Estella Barfield M.D. [facility] None     Luis Carlos Wall M.D.  [address] [place] [id number]  [phone]            MEDICATIONS ON DISCHARGE     Medication List      CONTINUE taking these medications      Instructions   docusate sodium 100 MG Caps  Commonly known as: COLACE   Take 1 Cap by mouth 2 times a day.  Dose: 100 mg     levalbuterol 45 MCG/ACT inhaler  Commonly known as: XOPENEX HFA   Inhale 1-2 Puffs every morning as needed for Shortness of Breath.  Dose: 1-2 Puff     nicotine polacrilex 4 MG gum  Commonly known as: NICORETTE   Take 4 mg by mouth as needed.  Dose: 4 mg     sertraline 100 MG Tabs  Commonly known as: Zoloft   Take 100 mg by mouth every day.  Dose: 100 mg     therapeutic multivitamin-minerals Tabs   Take 1 Tab by mouth every day.  Dose: 1 Tab            Allergies  No Known Allergies    DIET  Orders Placed This Encounter   Procedures   • Diet Order Diet: Regular     Standing Status:   Standing     Number of Occurrences:   1     Order Specific Question:   Diet:     Answer:   Regular [1]       ACTIVITY  As tolerated.  Weight bearing as tolerated    CONSULTATIONS  IR    IMAGING AND PROCEDURES  DX-CHEST-LIMITED (1 VIEW)   Final Result         No significant interval change in left-sided pleural effusion which is moderate in size.      Persistent left basilar atelectasis or consolidation.      DX-CHEST-PORTABLE (1 VIEW)   Final Result      1.  Negative for pneumothorax      2.  Left pleural effusion is considerably smaller after removal of 3100 mL of serosanguineous fluid      3.  Left basilar atelectasis      US-THORACENTESIS PUNCTURE LEFT   Final Result      1. Ultrasound guided left sided diagnostic and therapeutic thoracentesis.      2. 3100 mL of fluid withdrawn.      OUTSIDE IMAGES-DX CHEST   Final Result          LABORATORY  Lab Results   Component Value Date    SODIUM 126 (L) 01/28/2021    POTASSIUM 3.9  01/28/2021    CHLORIDE 90 (L) 01/28/2021    CO2 24 01/28/2021    GLUCOSE 101 (H) 01/28/2021    BUN 8 01/28/2021    CREATININE 0.82 01/28/2021        Lab Results   Component Value Date    WBC 10.7 01/28/2021    HEMOGLOBIN 15.6 01/28/2021    HEMATOCRIT 42.5 01/28/2021    PLATELETCT 311 01/28/2021        Total time of the discharge process exceeds 34 minutes.

## 2021-01-29 NOTE — DISCHARGE PLANNING
Received Choice form at 4424  Agency/Facility Name: [facility]   Referral sent per Choice form @ 1349

## 2021-01-29 NOTE — DOCUMENTATION QUERY
Count includes the [facility]                                                                       Query Response Note      PATIENT:               HELEN HAMMOND  ACCT #:                  [id number]  MRN:                     [id number]  :                      1965  ADMIT DATE:       2021 3:32 PM  DISCH DATE:        2021 11:48 AM  RESPONDING  PROVIDER #:        [id number]           QUERY TEXT:    Pt is admitted with SOB, Tachypnea, pursed lip breathing with Chronic Respiratory Failure (home O2 2L) requiring up to 5L oxygen is documented in the Medical Record. Can a diagnosis be provided to support these findings?  NOTE:  If an appropriate response is not listed below, please respond with a new note.    The patient's Clinical Indicators include:  ER RN Triage Note: Hx COPD on home 02 and 2L. +SOB + tripoding. +pursed lip breathing. + tachypnea. Sating at 92% on 5L NC. Lung sounds: diminished throughout, greatly decreased to lower lobes.    MD PN: Feels improved after 3L fluid have been removed. Oxygen requirements down to 4L NC today. COPD (chronic obstructive pulmonary disease) With chronic respiratory failure  Treatment: CXR, Thoracentesis, RT protocol, Smoking cessation encouraged   Risk Factors: COPD on 2L home oxygen, Renal cancer, Left pleural effusion, Smoker      Thank you,  Juan Toro RN, BSN  Clinical   Connect via [employer]  Options provided:   -- Acute on chronic respiratory failure   -- Chronic respiratory failure   -- Findings of no clinical significance   -- Unable to determine      Query created by: Juan Toro on 2021 8:21 AM    RESPONSE TEXT:    Acute on chronic respiratory failure          Electronically signed by:  SHIRA MITTAL MD 2021 2:33 PM

## 2021-01-29 NOTE — FACE TO FACE
"Face to Face Note  -  Durable Medical Equipment    Shonna Flores M.D. - NPI: [id number]  I certify that this patient is under my care and that they had a durable medical equipment(DME)face to face encounter by myself that meets the physician DME face-to-face encounter requirements with this patient on:    Date of encounter:   Patient:                    MRN:                       YOB: 2022  Zachery Heath  [id number]  1965     The encounter with the patient was in whole, or in part, for the following medical condition, which is the primary reason for durable medical equipment:  COPD    I certify that, based on my findings, the following durable medical equipment is medically necessary:  Oxygen.    HOME O2 Saturation Measurements:(Values must be present for Home Oxygen orders)  Room air sat at rest: 93  Room air sat with amb: 87  With liters of O2: 3, O2 sat at rest with O2: 94  With Liters of O2: 4, O2 sat with amb with O2 : 98  Is the patient mobile?: Yes    My Clinical findings support the need for the above equipment due to:  Hypoxia    Supporting Symptoms: The patient requires supplemental oxygen, as the following interventions have been tried with limited or no improvement: \"Ambulation with oximetry and \"Incentive spirometry    If patient feels more short of breath, they can go up to 6 liters per minute and contact healthcare provider.  "

## 2021-01-29 NOTE — ASSESSMENT & PLAN NOTE
Unknown etiology. Blood sugar is normal.   - urinalysis for ketones pending  - urine drug screen pending  - LDH pending

## 2021-01-29 NOTE — PROGRESS NOTES
Assumed care at 0700. All needs addressed and identified. Home O2 evaluation complete. Discharge orders in place. Waiting for O2 supplies and ride to arrive.

## 2021-02-01 ENCOUNTER — PATIENT OUTREACH (OUTPATIENT)
Dept: HEALTH INFORMATION MANAGEMENT | Facility: OTHER | Age: 56
End: 2021-02-01

## 2021-02-01 LAB
BACTERIA FLD AEROBE CULT: NORMAL
GRAM STN SPEC: NORMAL
SIGNIFICANT IND 70042: NORMAL
SITE SITE: NORMAL
SOURCE SOURCE: NORMAL

## 2021-02-01 SDOH — ECONOMIC STABILITY: TRANSPORTATION INSECURITY
IN THE PAST 12 MONTHS, HAS THE LACK OF TRANSPORTATION KEPT YOU FROM MEDICAL APPOINTMENTS OR FROM GETTING MEDICATIONS?: NO

## 2021-02-01 SDOH — ECONOMIC STABILITY: FOOD INSECURITY: WITHIN THE PAST 12 MONTHS, THE FOOD YOU BOUGHT JUST DIDN'T LAST AND YOU DIDN'T HAVE MONEY TO GET MORE.: NEVER TRUE

## 2021-02-01 SDOH — ECONOMIC STABILITY: INCOME INSECURITY: HOW HARD IS IT FOR YOU TO PAY FOR THE VERY BASICS LIKE FOOD, HOUSING, MEDICAL CARE, AND HEATING?: NOT HARD AT ALL

## 2021-02-01 SDOH — ECONOMIC STABILITY: TRANSPORTATION INSECURITY
IN THE PAST 12 MONTHS, HAS LACK OF TRANSPORTATION KEPT YOU FROM MEETINGS, WORK, OR FROM GETTING THINGS NEEDED FOR DAILY LIVING?: NO

## 2021-02-01 SDOH — ECONOMIC STABILITY: FOOD INSECURITY: WITHIN THE PAST 12 MONTHS, YOU WORRIED THAT YOUR FOOD WOULD RUN OUT BEFORE YOU GOT MONEY TO BUY MORE.: NEVER TRUE

## 2021-02-01 NOTE — PROGRESS NOTES
Community Health Worker Intake  • Social determinates of health intake complete.   • Identified no barriers.  • Contact information provided to Zachery Heath  • Outpatient assessment completed.    CHW Vinson called the patient to introduce CCM services. Patient states that he is established with Luis Carlos Wall M.D. for primary care in [place]. Patient states that he is capable of scheduling his own hospital follow up appointment and does not need assistance from CHW. Patient states that he has housing, is self driving, and visits his local food bank often. Patient has no needs or questions for Community Care Management.     Plan: Due to no needs, UGO Muñiz will remove the patient from CCM caseload at this time. Patient wrote down CCM contact information and will call with any needs.

## 2021-02-03 ENCOUNTER — PATIENT OUTREACH (OUTPATIENT)
Dept: HEALTH INFORMATION MANAGEMENT | Facility: OTHER | Age: 56
End: 2021-02-03

## 2021-02-03 NOTE — PROGRESS NOTES
"Patient called Community Care Management requesting assistance is acquiring test results stating that he is having trouble breathing. CHW explained to the patient that [place] [facility] can call 911 or he should call if he is unable to breathe. Patient states that it is not necessary as he \"will call 911 or go to the hospital himself hen he feels he needs to\". UGO Montañoson explained to the patient that he can activate [employer] to view test/ lab results. Patient states that he does not have access to a computer or smart phone. UGO Montañoson also explained that he should schedule an appointment with his primary care provider Luis Carlos Wall M.D.and have their office request a medical record release. UGO Muñiz provided [facility] medical records phone number. It was also recommended by the hospitalist that the patient receive outpatient thoracentesis. CHW called [facility] and asked their imaging/ radiology department if they provide thoracentesis outpatient. They explained that they do with a primary care providers order and a recent chest xray.  CHW relayed this information to the patient. The patient explained that he will call his primary care provider to schedule the soonest available appointment. CHW advised the patient a second time to go to the emergency room but the patient declined.      Plan: CHW will call the patient at a later date to follow up.   "

## 2021-02-04 ENCOUNTER — PATIENT OUTREACH (OUTPATIENT)
Dept: HEALTH INFORMATION MANAGEMENT | Facility: OTHER | Age: 56
End: 2021-02-04

## 2021-02-04 NOTE — PROGRESS NOTES
CHW received a call from Luis Carlos Wall's MA. MA states that the patient needs to establish with a new provider as Luis Carlos Wall M.D. last day is today. MA also states that they need a written consent to request records from the patient. MA states that the patient needs to call himself to establish and would not provide the phone numbers to CHW for other clinics.     CHW called the patient and advised him to call a clinic today and get scheduled. CHW also introduced MTM but will remind the patient again once he has a scheduled primary care appointment. CHW will call the patient at a later date.

## 2021-02-08 ENCOUNTER — PATIENT OUTREACH (OUTPATIENT)
Dept: HEALTH INFORMATION MANAGEMENT | Facility: OTHER | Age: 56
End: 2021-02-08

## 2021-02-08 NOTE — PROGRESS NOTES
Community Health Worker Intake  • Social determinates of health intake complete.   • Identified no barriers.   • Contact information provided to Zachery Timothy Heath   • Has PCP appointment scheduled for 2/22/2021 @ 4:20pm.   • Outpatient assessment completed.    CHW Vinson called the patient to follow up. Patient states that he established with a new Primary care Provider in [place], [place]. Patient has an appointment on 2/22/2021 @ 4:20pm with Michael Sultana M.D. UGO Muñiz reminded the patient that he needs to request an order for outpatient thoracentesis during this appointment. UGO also reminded the patient that he has a pulmonary Telemed appointment on 3/15/2021 @ 11:20am with Estella Barfield M.D. Patient states that he is feeling much better after going into the Emergency room on Friday morning, 2/5/2021. Patient states that hey drained fluid in the ER and he is feeling great. Patient has no other needs or concerns but will call [facility] with any needs in the future.      Plan: UGO Muñiz will remove the patient from CCM caseload.

## 2021-02-24 LAB
FUNGUS SPEC CULT: NORMAL
SIGNIFICANT IND 70042: NORMAL
SITE SITE: NORMAL
SOURCE SOURCE: NORMAL

## 2021-03-25 LAB
FINAL REPORT Q0603: NORMAL
PRELIMINARY RPT Q0601: NORMAL
RHODAMINE-AURAMINE STN SPEC: NORMAL

## 2021-06-11 ENCOUNTER — HOSPITAL ENCOUNTER (OUTPATIENT)
Dept: RADIOLOGY | Facility: MEDICAL CENTER | Age: 56
End: 2021-06-11
Payer: MEDICAID

## 2021-06-14 ENCOUNTER — HOSPITAL ENCOUNTER (OUTPATIENT)
Dept: RADIOLOGY | Facility: MEDICAL CENTER | Age: 56
End: 2021-06-14
Payer: MEDICAID

## 2021-07-19 ENCOUNTER — PRE-ADMISSION TESTING (OUTPATIENT)
Dept: ADMISSIONS | Facility: MEDICAL CENTER | Age: 56
End: 2021-07-19
Attending: INTERNAL MEDICINE
Payer: MEDICAID

## 2021-07-19 RX ORDER — ASPIRIN 81 MG/1
81 TABLET, CHEWABLE ORAL DAILY
Status: ON HOLD | COMMUNITY
End: 2023-01-24

## 2021-07-28 NOTE — OR NURSING
COVID-19 Pre-surgery screenin. Do you have an undiagnosed respiratory illness or symptoms such as coughing or sneezing? No  a. Onset of Sx No  b. Acute vs. chronic respiratory illness No    2. Do you have an unexplained fever greater than 100.4 degrees Fahrenheit or 38 degrees Celsius?     No    3. Have you had direct exposure to a patient who tested positive for Covid-19?    No    4. Have you had any loss of your sense of taste or smell, N/V or sore throat? No    Patient has been informed of visitor policy and asked to wear a mask upon entering the hospital. Yes

## 2021-07-29 ENCOUNTER — HOSPITAL ENCOUNTER (OUTPATIENT)
Facility: MEDICAL CENTER | Age: 56
End: 2021-07-29
Attending: INTERNAL MEDICINE | Admitting: RADIOLOGY
Payer: MEDICAID

## 2021-07-29 ENCOUNTER — APPOINTMENT (OUTPATIENT)
Dept: RADIOLOGY | Facility: MEDICAL CENTER | Age: 56
End: 2021-07-29
Attending: RADIOLOGY
Payer: MEDICAID

## 2021-07-29 ENCOUNTER — APPOINTMENT (OUTPATIENT)
Dept: RADIOLOGY | Facility: MEDICAL CENTER | Age: 56
End: 2021-07-29
Attending: INTERNAL MEDICINE
Payer: MEDICAID

## 2021-07-29 VITALS
WEIGHT: 128.31 LBS | HEART RATE: 84 BPM | TEMPERATURE: 98.1 F | RESPIRATION RATE: 20 BRPM | BODY MASS INDEX: 19.45 KG/M2 | DIASTOLIC BLOOD PRESSURE: 83 MMHG | HEIGHT: 68 IN | OXYGEN SATURATION: 91 % | SYSTOLIC BLOOD PRESSURE: 129 MMHG

## 2021-07-29 DIAGNOSIS — C64.1 MALIGNANT NEOPLASM OF RIGHT KIDNEY, EXCEPT RENAL PELVIS (HCC): ICD-10-CM

## 2021-07-29 LAB
ERYTHROCYTE [DISTWIDTH] IN BLOOD BY AUTOMATED COUNT: 51.9 FL (ref 35.9–50)
HCT VFR BLD AUTO: 44.7 % (ref 42–52)
HGB BLD-MCNC: 15.9 G/DL (ref 14–18)
INR PPP: 0.99 (ref 0.87–1.13)
MCH RBC QN AUTO: 34 PG (ref 27–33)
MCHC RBC AUTO-ENTMCNC: 35.6 G/DL (ref 33.7–35.3)
MCV RBC AUTO: 95.5 FL (ref 81.4–97.8)
PATHOLOGY CONSULT NOTE: NORMAL
PLATELET # BLD AUTO: 322 K/UL (ref 164–446)
PMV BLD AUTO: 8.1 FL (ref 9–12.9)
PROTHROMBIN TIME: 12.8 SEC (ref 12–14.6)
RBC # BLD AUTO: 4.68 M/UL (ref 4.7–6.1)
WBC # BLD AUTO: 5.4 K/UL (ref 4.8–10.8)

## 2021-07-29 PROCEDURE — 71045 X-RAY EXAM CHEST 1 VIEW: CPT

## 2021-07-29 PROCEDURE — 700111 HCHG RX REV CODE 636 W/ 250 OVERRIDE (IP): Performed by: RADIOLOGY

## 2021-07-29 PROCEDURE — 88305 TISSUE EXAM BY PATHOLOGIST: CPT

## 2021-07-29 PROCEDURE — 85027 COMPLETE CBC AUTOMATED: CPT

## 2021-07-29 PROCEDURE — 85610 PROTHROMBIN TIME: CPT

## 2021-07-29 PROCEDURE — 99152 MOD SED SAME PHYS/QHP 5/>YRS: CPT

## 2021-07-29 PROCEDURE — 160002 HCHG RECOVERY MINUTES (STAT)

## 2021-07-29 PROCEDURE — 700111 HCHG RX REV CODE 636 W/ 250 OVERRIDE (IP)

## 2021-07-29 RX ORDER — MORPHINE SULFATE 4 MG/ML
4 INJECTION, SOLUTION INTRAMUSCULAR; INTRAVENOUS
Status: DISCONTINUED | OUTPATIENT
Start: 2021-07-29 | End: 2021-07-29 | Stop reason: HOSPADM

## 2021-07-29 RX ORDER — OXYCODONE HYDROCHLORIDE 5 MG/1
10 TABLET ORAL
Status: DISCONTINUED | OUTPATIENT
Start: 2021-07-29 | End: 2021-07-29 | Stop reason: HOSPADM

## 2021-07-29 RX ORDER — MIDAZOLAM HYDROCHLORIDE 1 MG/ML
.5-2 INJECTION INTRAMUSCULAR; INTRAVENOUS PRN
Status: ACTIVE | OUTPATIENT
Start: 2021-07-29 | End: 2021-07-29

## 2021-07-29 RX ORDER — ONDANSETRON 2 MG/ML
4 INJECTION INTRAMUSCULAR; INTRAVENOUS PRN
Status: ACTIVE | OUTPATIENT
Start: 2021-07-29 | End: 2021-07-29

## 2021-07-29 RX ORDER — MIDAZOLAM HYDROCHLORIDE 1 MG/ML
INJECTION INTRAMUSCULAR; INTRAVENOUS
Status: COMPLETED
Start: 2021-07-29 | End: 2021-07-29

## 2021-07-29 RX ORDER — SODIUM CHLORIDE 9 MG/ML
500 INJECTION, SOLUTION INTRAVENOUS
Status: ACTIVE | OUTPATIENT
Start: 2021-07-29 | End: 2021-07-29

## 2021-07-29 RX ORDER — OXYCODONE HYDROCHLORIDE 5 MG/1
5 TABLET ORAL
Status: DISCONTINUED | OUTPATIENT
Start: 2021-07-29 | End: 2021-07-29 | Stop reason: HOSPADM

## 2021-07-29 RX ORDER — NALOXONE HYDROCHLORIDE 0.4 MG/ML
INJECTION, SOLUTION INTRAMUSCULAR; INTRAVENOUS; SUBCUTANEOUS
Status: COMPLETED
Start: 2021-07-29 | End: 2021-07-29

## 2021-07-29 RX ADMIN — MIDAZOLAM HYDROCHLORIDE 2 MG: 1 INJECTION, SOLUTION INTRAMUSCULAR; INTRAVENOUS at 11:09

## 2021-07-29 RX ADMIN — FENTANYL CITRATE 50 MCG: 50 INJECTION, SOLUTION INTRAMUSCULAR; INTRAVENOUS at 11:09

## 2021-07-29 RX ADMIN — MIDAZOLAM HYDROCHLORIDE 1 MG: 1 INJECTION, SOLUTION INTRAMUSCULAR; INTRAVENOUS at 11:13

## 2021-07-29 RX ADMIN — FENTANYL CITRATE 50 MCG: 50 INJECTION INTRAMUSCULAR; INTRAVENOUS at 11:09

## 2021-07-29 RX ADMIN — FENTANYL CITRATE 25 MCG: 50 INJECTION, SOLUTION INTRAMUSCULAR; INTRAVENOUS at 11:13

## 2021-07-29 ASSESSMENT — FIBROSIS 4 INDEX: FIB4 SCORE: 1.16

## 2021-07-29 ASSESSMENT — PAIN DESCRIPTION - PAIN TYPE
TYPE: SURGICAL PAIN
TYPE: SURGICAL PAIN

## 2021-07-29 NOTE — DISCHARGE INSTRUCTIONS
ACTIVITY: Rest and take it easy for the first 24 hours.  A responsible adult is recommended to remain with you during that time.  It is normal to feel sleepy.  We encourage you to not do anything that requires balance, judgment or coordination.    MILD FLU-LIKE SYMPTOMS ARE NORMAL. YOU MAY EXPERIENCE GENERALIZED MUSCLE ACHES, THROAT IRRITATION, HEADACHE AND/OR SOME NAUSEA.    FOR 24 HOURS DO NOT:  Drive, operate machinery or run household appliances.  Drink beer or alcoholic beverages.   Make important decisions or sign legal documents.    SPECIAL INSTRUCTIONS:     Lung Biopsy, Care After  This sheet gives you information about how to care for yourself after your procedure. Your health care provider may also give you more specific instructions depending on the type of biopsy you had. If you have problems or questions, contact your health care provider.  What can I expect after the procedure?  After the procedure, it is common to have:  · A cough.  · A sore throat.  · Pain where a needle, bronchoscope, or incision was used to collect a biopsy sample (biopsy site).  Follow these instructions at home:  Medicines  · Take over-the-counter and prescription medicines only as told by your health care provider.  · Do not drink alcohol if your health care provider tells you not to drink.  · Ask your health care provider if the medicine prescribed to you:  ? Requires you to avoid driving or using heavy machinery.  ? Can cause constipation. You may need to take these actions to prevent or treat constipation:  § Drink enough fluid to keep your urine pale yellow.  § Take over-the-counter or prescription medicines.  § Eat foods that are high in fiber, such as beans, whole grains, and fresh fruits and vegetables.  § Limit foods that are high in fat and processed sugars, such as fried or sweet foods.  · Do not drive for 24 hours if you were given a sedative.  Biopsy site care    · Follow instructions from your health care provider  about how to take care of your biopsy site. Make sure you:  ? Wash your hands with soap and water before and after you change your bandage (dressing). If soap and water are not available, use hand .  ? Change your dressing as told by your health care provider.  ? Leave stitches (sutures), skin glue, or adhesive strips in place. These skin closures may need to stay in place for 2 weeks or longer. If adhesive strip edges start to loosen and curl up, you may trim the loose edges. Do not remove adhesive strips completely unless your health care provider tells you to do that.  · Do not take baths, swim, or use a hot tub until your health care provider approves. Ask your health care provider if you may take showers. You may only be allowed to take sponge baths.  · Check your biopsy site every day for signs of infection. Check for:  ? Redness, swelling, or more pain.  ? Fluid or blood.  ? Warmth.  ? Pus or a bad smell.  General instructions  · Return to your normal activities as told by your health care provider. Ask your health care provider what activities are safe for you.  · It is up to you to get the results of your procedure. Ask your health care provider, or the department that is doing the procedure, when your results will be ready.  · Keep all follow-up visits as told by your health care provider. This is important.  Contact a health care provider if:  · You have a fever.  · You have redness, swelling, or more pain around your biopsy site.  · You have fluid or blood coming from your biopsy site.  · Your biopsy site feels warm to the touch.  · You have pus or a bad smell coming from your biopsy site.  · You have pain that does not get better with medicine.  Get help right away if:  · You cough up blood.  · You have trouble breathing.  · You have chest pain.  · You lose consciousness.  Summary  · After the procedure, it is common to have a sore throat and a cough.  · Return to your normal activities as told  by your health care provider. Ask your health care provider what activities are safe for you.  · Take over-the-counter and prescription medicines only as told by your health care provider.  · Report any unusual symptoms to your health care provider.  This information is not intended to replace advice given to you by your health care provider. Make sure you discuss any questions you have with your health care provider.  Document Released: 01/16/2018 Document Revised: 01/22/2020 Document Reviewed: 01/16/2018  [employer] Patient Education © 2020 [employer]    DIET: To avoid nausea, slowly advance diet as tolerated, avoiding spicy or greasy foods for the first day.  Add more substantial food to your diet according to your physician's instructions.  INCREASE FLUIDS AND FIBER TO AVOID CONSTIPATION.    SURGICAL DRESSING/BATHING: No soaking in water, bath/hot tub/swimming, etc. Until cleared by your MD.    FOLLOW-UP APPOINTMENT:  A follow-up appointment should be arranged with your doctor in 1-2 weeks; call to schedule.    You should CALL YOUR PHYSICIAN if you develop:  Fever greater than 101 degrees F.  Pain not relieved by medication, or persistent nausea or vomiting.  Excessive bleeding (blood soaking through dressing) or unexpected drainage from the wound.  Extreme redness or swelling around the incision site, drainage of pus or foul smelling drainage.  Inability to urinate or empty your bladder within 8 hours.  Problems with breathing or chest pain.    You should call 911 if you develop problems with breathing or chest pain.  If you are unable to contact your doctor or surgical center, you should go to the nearest emergency room or urgent care center.  Physician's telephone #: Dr. Salas: [phone]    If any questions arise, call your doctor.  If your doctor is not available, please feel free to call the Surgical Center at [phone]. The Contact Center is open Monday through Friday 7AM to 5PM and may speak to a  nurse at [phone], or toll free at [phone].     A registered nurse may call you a few days after your surgery to see how you are doing after your procedure.    MEDICATIONS: Resume taking daily medication.  Take prescribed pain medication with food.  If no medication is prescribed, you may take non-aspirin pain medication if needed.  PAIN MEDICATION CAN BE VERY CONSTIPATING.  Take a stool softener or laxative such as senokot, pericolace, or milk of magnesia if needed.    Prescription given for N/A.  Last pain medication given at N/A.    If your physician has prescribed pain medication that includes Acetaminophen (Tylenol), do not take additional Acetaminophen (Tylenol) while taking the prescribed medication.    Depression / Suicide Risk    As you are discharged from this [facility] facility, it is important to learn how to keep safe from harming yourself.    Recognize the warning signs:  · Abrupt changes in personality, positive or negative- including increase in energy   · Giving away possessions  · Change in eating patterns- significant weight changes-  positive or negative  · Change in sleeping patterns- unable to sleep or sleeping all the time   · Unwillingness or inability to communicate  · Depression  · Unusual sadness, discouragement and loneliness  · Talk of wanting to die  · Neglect of personal appearance   · Rebelliousness- reckless behavior  · Withdrawal from people/activities they love  · Confusion- inability to concentrate     If you or a loved one observes any of these behaviors or has concerns about self-harm, here's what you can do:  · Talk about it- your feelings and reasons for harming yourself  · Remove any means that you might use to hurt yourself (examples: pills, rope, extension cords, firearm)  · Get professional help from the community (Mental Health, Substance Abuse, psychological counseling)  · Do not be alone:Call your Safe Contact- someone whom you trust who will be there  for you.  · Call your local CRISIS HOTLINE 423-5317 or 487-035-3756  · Call your local Children's Mobile Crisis Response Team [place] (536) 154-3460 or www.[employer]  · Call the toll free National Suicide Prevention Hotlines   · National Suicide Prevention Lifeline 194-351-GHEB (7042)  · National [employer] Line Network 800-SUICIDE (771-0802)

## 2021-07-29 NOTE — OR SURGEON
Immediate Post- Operative Note        PostOp Diagnosis: RCC with L lung mass.      Procedure(s): CT guided L lung biopsy.      Estimated Blood Loss: Less than 5 ml        Complications: None            7/29/2021     10:41 AM     Miguelito Salas M.D.

## 2021-07-29 NOTE — PROGRESS NOTES
Pt VSS, pt denies pain, no nausea, tolerating po intake. Pt and family given discharge education/instructions, all questions answered. IV discontinued, site wnl. Puncture site with band aid, site wnl, only scant shadowing noted. 2nd CXR completed, no pneumo per results. Pt discharged home in stable condition with all belongings.

## 2021-07-29 NOTE — PROGRESS NOTES
"History and Physical    Date: 7/29/2021    PCP: Michael Sultana M.D.      CC: Hx RCC with L lung mass.     HPI: This is a 56 y.o. male who is presenting for CT guided biopsy of L lung mass with hx of R RCC s/p nephrectomy.    Past Medical History:   Diagnosis Date   • Bowel habit changes 2021    loose stools   • Breath shortness 07/2021    O2 at night   • Cancer (HCC) 7/2017    right kidney   • COPD (chronic obstructive pulmonary disease) (HCC)     mild   • Emphysema of lung (HCC) 2010    COPD   • Pain     right testicle, bladder   • Pain 07/2021    left side of sternum   • Pneumonia 07/2021    \"lung fills with fluid\"  drained x3   • Psychiatric problem     anxiety w/MRI; claustrophobic   • Renal disorder 2017    right kidney removed for tumor   • Urinary bladder disorder     bloody urine due to cancer       Past Surgical History:   Procedure Laterality Date   • OTHER Left 2021    lung fluid drain x3   • NEPHRECTOMY RADICAL Right 8/23/2017    Procedure: NEPHRECTOMY WITH TUMOR THROMBUS RESECTION ;  Surgeon: Héctor Mims M.D.;  Location: SURGERY [facility];  Service:        No current facility-administered medications for this encounter.        Social History     Socioeconomic History   • Marital status:      Spouse name: Not on file   • Number of children: Not on file   • Years of education: Not on file   • Highest education level: Not on file   Occupational History   • Not on file   Tobacco Use   • Smoking status: Current Every Day Smoker     Packs/day: 0.25     Years: 30.00     Pack years: 7.50     Types: Cigarettes   • Smokeless tobacco: Never Used   • Tobacco comment: 2 cig/day   Vaping Use   • Vaping Use: Never used   Substance and Sexual Activity   • Alcohol use: Yes     Comment: 2-3 per week   • Drug use: Yes     Types: Inhaled     Comment: marijuana daily-last use 1 week ago   • Sexual activity: Not on file   Other Topics Concern   • Not on file   Social History Narrative   • Not on file "     Social Determinants of Health     Financial Resource Strain: Low Risk    • Difficulty of Paying Living Expenses: Not hard at all   Food Insecurity: No Food Insecurity   • Worried About Running Out of Food in the Last Year: Never true   • Ran Out of Food in the Last Year: Never true   Transportation Needs: No Transportation Needs   • Lack of Transportation (Medical): No   • Lack of Transportation (Non-Medical): No   Physical Activity:    • Days of Exercise per Week:    • Minutes of Exercise per Session:    Stress:    • Feeling of Stress :    Social Connections:    • Frequency of Communication with Friends and Family:    • Frequency of Social Gatherings with Friends and Family:    • Attends Mu-ism Services:    • Active Member of Clubs or Organizations:    • Attends Club or Organization Meetings:    • Marital Status:    Intimate Partner Violence:    • Fear of Current or Ex-Partner:    • Emotionally Abused:    • Physically Abused:    • Sexually Abused:        History reviewed. No pertinent family history.    Allergies:  Patient has no known allergies.    Review of Systems:  Negative    Physical Exam    Vital Signs  Blood Pressure: 128/81   Temperature: 36.3 °C (97.4 °F)   Pulse: 96   Respiration: 18   Pulse Oximetry: 96 %        Labs:  Recent Labs     07/29/21  0945   WBC 5.4   RBC 4.68*   HEMOGLOBIN 15.9   HEMATOCRIT 44.7   MCV 95.5   MCH 34.0*   MCHC 35.6*   RDW 51.9*   PLATELETCT 322   MPV 8.1*                   Radiology:  No orders to display             Assessment and Plan:This is a 56 y.o. male who is presenting for CT guided biopsy of L lung mass with hx of R RCC s/p nephrectomy.

## 2021-07-29 NOTE — OR NURSING
Patient recovering well post left lung biopsy. AAOx4, denies any pain or SOB. VSS, afebrile, 2L nasal cannula 95% breathing even and unlabored. Left posterior back biopsy site CDI. Family updated on status and plan of care. First CXR complete.

## 2021-07-29 NOTE — PROGRESS NOTES
CT Nursing Note      Left Lung Biopsy with Possible Chest Tube Placement by MD Salas assisted by RT Dodson, Left Posterior Chest Wall access site.    x2 cores in Formalin collected by Dr. Salas, specimen sent to pathology, delivered to lab by Chidi MAR.    Access site covered with bandaid, C/D/I.    Rport given to TYRESE Rincon; patient transported to [facility] via IR RN monitored then transferred care to report RN.

## 2021-07-30 ASSESSMENT — ENCOUNTER SYMPTOMS
SHORTNESS OF BREATH: 1
FEVER: 0
NAUSEA: 0
BLURRED VISION: 0
COUGH: 1
VOMITING: 0
BRUISES/BLEEDS EASILY: 0
HEADACHES: 0
CHILLS: 0
BACK PAIN: 1
DIZZINESS: 0
HEMOPTYSIS: 0
PALPITATIONS: 0

## 2021-07-30 NOTE — PROGRESS NOTES
Radiology Progress Note- Telephone follow up    Author: ROBERTO CARLOS Kaba Date & Time created: 7/30/2021  12:22 PM   Date of admission  7/29/2021  Note to reader: this note follows the APSO format rather than the historical SOAP format. Assessment and plan located at the top of the note for ease of use.    Chief Complaint  56 y.o. male came in through outpatient on 7/29/2021 for a CT guided Left lung biopsy    5 minutes spent on the phone with patient     Assessment/Plan  Interval History   Patient Active Problem List    Diagnosis Date Noted   • COPD (chronic obstructive pulmonary disease) ([facility]) 01/27/2021   • Tobacco abuse 01/27/2021   • Pulmonary nodule 01/27/2021   • Recurrent pleural effusion on left 01/15/2021   • Hyponatremia 01/15/2021   • Renal carcinoma (HCC) 08/23/2017        Plan IR  - BX resulted, patient to scheduled a follow up appointment with MD Aguilar to discuss BX results  - Education provided on s/s of infection     - No baths/ swimming/ soaking in tub for 5 days. Shower OK. OK to change dressings to band aid as needed.   - Thank you for allowing Interventional Radiology team to participate in the patients care, if any additonal care or requests are needed in the future please do not hesitate call or place IR order        [id number]  IR:             Review of Systems     Review of Systems   Constitutional: Positive for malaise/fatigue. Negative for chills and fever.   Eyes: Negative for blurred vision.   Respiratory: Positive for cough and shortness of breath. Negative for hemoptysis.         History of smoking, home O2    Cardiovascular: Negative for chest pain and palpitations.   Gastrointestinal: Negative for nausea and vomiting.   Musculoskeletal: Positive for back pain.        Chronic    Skin: Negative for itching and rash.   Neurological: Negative for dizziness and headaches.   Endo/Heme/Allergies: Does not bruise/bleed easily.           Labs    Recent Labs     07/29/21  0945   WBC 5.4      RBC 4.68*   HEMOGLOBIN 15.9   HEMATOCRIT 44.7   MCV 95.5   MCH 34.0*   MCHC 35.6*   RDW 51.9*   PLATELETCT 322   MPV 8.1*         DX-CHEST-PORTABLE (1 VIEW)   Final Result      No pneumothorax following left lung biopsy.      DX-CHEST-PORTABLE (1 VIEW)   Final Result      1.  No pneumothorax following left lung biopsy.   2.  Multiple left-sided pulmonary opacities. Small left pleural effusion.      CT-BIOPSY-LUNG/MEDIASTINUM W/GUIDE LEFT   Final Result      1.  CT guided left lower lobe lung biopsy.   2.  Followup serial chest radiographs are forthcoming in 1 and 3 hours.   3. Sampling was terminated after 2 samples were collected due to patient uncontrolled coughing preventing further sampling.        No results for input(s): SODIUM, POTASSIUM, CHLORIDE, CO2, GLUCOSE, BUN, CPKTOTAL in the last 72 hours.  INR   Date Value Ref Range Status   07/29/2021 0.99 0.87 - 1.13 Final     Comment:     INR - Non-therapeutic Reference Range: 0.87-1.13  INR - Therapeutic Reference Range: 2.0-4.0       No results found for: POCINR     Intake/Output Summary (Last 24 hours) at 7/30/2021 1222  Last data filed at 7/29/2021 1359  Gross per 24 hour   Intake 170 ml   Output --   Net 170 ml       Results     ** No results found for the last 168 hours. **             Labs not explicitly included in this progress note were reviewed by the author. Radiology/imaging not explicitly included in this progress note was reviewed by the author.     I have performed a telephone ROS and updated the patient with the plan of care (7/30/2021).     14 minutes in directly providing, coordinating, and educating patient with extensive data review.

## 2021-08-25 ENCOUNTER — PATIENT OUTREACH (OUTPATIENT)
Dept: OTHER | Facility: MEDICAL CENTER | Age: 56
End: 2021-08-25

## 2021-08-25 NOTE — PROGRESS NOTES
Referral received.  Call placed to patient who reports will be starting his immunotherapy tomorrow at [facility].  He said he also has pills he will be taking, discussed a little worried since he has been reading all the side effects of the medication.  He was driving and did not have information on what medication was at this time.  Pt reports his brother will be driving him to his appointment.  Has his brother and mother as support.  When asked about finances pt reports lives on $800 a month, does get $98 in food stamps.  Discussed with him [facility] transportation benefits as well.  Let him know regarding gas reimbursement that may help offset cost.  Pt saying he will look into it.  He denies concerns regarding paying for food while in [place], saying he will just be going back same day to [place].  Denies any immediate financial need, reporting Medicaid is covering cost of medications and treatment.  Let patient know if becomes a concern, to contact navigator.  Pt very grateful.  Unable to give contact information since patient was driving.  Information can be provided tomorrow when he comes for treatment.  Pt very grateful for call and support.

## 2021-08-26 ENCOUNTER — OUTPATIENT INFUSION SERVICES (OUTPATIENT)
Dept: ONCOLOGY | Facility: MEDICAL CENTER | Age: 56
End: 2021-08-26
Attending: INTERNAL MEDICINE
Payer: MEDICAID

## 2021-08-26 VITALS
RESPIRATION RATE: 18 BRPM | HEIGHT: 64 IN | TEMPERATURE: 97.4 F | SYSTOLIC BLOOD PRESSURE: 123 MMHG | HEART RATE: 93 BPM | DIASTOLIC BLOOD PRESSURE: 66 MMHG | WEIGHT: 130.07 LBS | OXYGEN SATURATION: 93 % | BODY MASS INDEX: 22.21 KG/M2

## 2021-08-26 DIAGNOSIS — C64.1 CARCINOMA OF RIGHT KIDNEY (HCC): ICD-10-CM

## 2021-08-26 LAB
ALBUMIN SERPL BCP-MCNC: 3.8 G/DL (ref 3.2–4.9)
ALBUMIN/GLOB SERPL: 1.1 G/DL
ALP SERPL-CCNC: 295 U/L (ref 30–99)
ALT SERPL-CCNC: 39 U/L (ref 2–50)
ANION GAP SERPL CALC-SCNC: 22 MMOL/L (ref 7–16)
AST SERPL-CCNC: 71 U/L (ref 12–45)
BASOPHILS # BLD AUTO: 0.8 % (ref 0–1.8)
BASOPHILS # BLD: 0.04 K/UL (ref 0–0.12)
BILIRUB SERPL-MCNC: 0.5 MG/DL (ref 0.1–1.5)
BUN SERPL-MCNC: 12 MG/DL (ref 8–22)
CALCIUM SERPL-MCNC: 9.2 MG/DL (ref 8.5–10.5)
CHLORIDE SERPL-SCNC: 93 MMOL/L (ref 96–112)
CO2 SERPL-SCNC: 17 MMOL/L (ref 20–33)
CREAT SERPL-MCNC: 0.81 MG/DL (ref 0.5–1.4)
EOSINOPHIL # BLD AUTO: 0.07 K/UL (ref 0–0.51)
EOSINOPHIL NFR BLD: 1.4 % (ref 0–6.9)
ERYTHROCYTE [DISTWIDTH] IN BLOOD BY AUTOMATED COUNT: 50.1 FL (ref 35.9–50)
GLOBULIN SER CALC-MCNC: 3.6 G/DL (ref 1.9–3.5)
GLUCOSE SERPL-MCNC: 50 MG/DL (ref 65–99)
HCT VFR BLD AUTO: 41.9 % (ref 42–52)
HGB BLD-MCNC: 14.6 G/DL (ref 14–18)
IMM GRANULOCYTES # BLD AUTO: 0.04 K/UL (ref 0–0.11)
IMM GRANULOCYTES NFR BLD AUTO: 0.8 % (ref 0–0.9)
LYMPHOCYTES # BLD AUTO: 0.75 K/UL (ref 1–4.8)
LYMPHOCYTES NFR BLD: 15.2 % (ref 22–41)
MCH RBC QN AUTO: 33 PG (ref 27–33)
MCHC RBC AUTO-ENTMCNC: 34.8 G/DL (ref 33.7–35.3)
MCV RBC AUTO: 94.8 FL (ref 81.4–97.8)
MONOCYTES # BLD AUTO: 0.43 K/UL (ref 0–0.85)
MONOCYTES NFR BLD AUTO: 8.7 % (ref 0–13.4)
NEUTROPHILS # BLD AUTO: 3.62 K/UL (ref 1.82–7.42)
NEUTROPHILS NFR BLD: 73.1 % (ref 44–72)
NRBC # BLD AUTO: 0 K/UL
NRBC BLD-RTO: 0 /100 WBC
PLATELET # BLD AUTO: 291 K/UL (ref 164–446)
PMV BLD AUTO: 8 FL (ref 9–12.9)
POTASSIUM SERPL-SCNC: 4.2 MMOL/L (ref 3.6–5.5)
PROT SERPL-MCNC: 7.4 G/DL (ref 6–8.2)
RBC # BLD AUTO: 4.42 M/UL (ref 4.7–6.1)
SODIUM SERPL-SCNC: 132 MMOL/L (ref 135–145)
T4 FREE SERPL-MCNC: 0.95 NG/DL (ref 0.93–1.7)
TSH SERPL DL<=0.005 MIU/L-ACNC: 1.4 UIU/ML (ref 0.38–5.33)
WBC # BLD AUTO: 5 K/UL (ref 4.8–10.8)

## 2021-08-26 PROCEDURE — 700105 HCHG RX REV CODE 258: Performed by: INTERNAL MEDICINE

## 2021-08-26 PROCEDURE — 700111 HCHG RX REV CODE 636 W/ 250 OVERRIDE (IP): Performed by: INTERNAL MEDICINE

## 2021-08-26 PROCEDURE — 84439 ASSAY OF FREE THYROXINE: CPT

## 2021-08-26 PROCEDURE — 85025 COMPLETE CBC W/AUTO DIFF WBC: CPT

## 2021-08-26 PROCEDURE — 84443 ASSAY THYROID STIM HORMONE: CPT

## 2021-08-26 PROCEDURE — 96413 CHEMO IV INFUSION 1 HR: CPT

## 2021-08-26 PROCEDURE — 80053 COMPREHEN METABOLIC PANEL: CPT

## 2021-08-26 RX ORDER — ONDANSETRON 2 MG/ML
4 INJECTION INTRAMUSCULAR; INTRAVENOUS PRN
Status: CANCELLED | OUTPATIENT
Start: 2021-08-26

## 2021-08-26 RX ORDER — 0.9 % SODIUM CHLORIDE 0.9 %
VIAL (ML) INJECTION PRN
Status: CANCELLED | OUTPATIENT
Start: 2021-08-26

## 2021-08-26 RX ORDER — 0.9 % SODIUM CHLORIDE 0.9 %
10 VIAL (ML) INJECTION PRN
Status: CANCELLED | OUTPATIENT
Start: 2021-08-26

## 2021-08-26 RX ORDER — 0.9 % SODIUM CHLORIDE 0.9 %
3 VIAL (ML) INJECTION PRN
Status: CANCELLED | OUTPATIENT
Start: 2021-08-26

## 2021-08-26 RX ORDER — HEPARIN SODIUM (PORCINE) LOCK FLUSH IV SOLN 100 UNIT/ML 100 UNIT/ML
500 SOLUTION INTRAVENOUS PRN
Status: CANCELLED | OUTPATIENT
Start: 2021-08-26

## 2021-08-26 RX ORDER — PROCHLORPERAZINE MALEATE 10 MG
10 TABLET ORAL EVERY 6 HOURS PRN
Status: CANCELLED | OUTPATIENT
Start: 2021-08-26

## 2021-08-26 RX ORDER — AXITINIB 5 MG/1
TABLET, FILM COATED ORAL 2 TIMES DAILY
COMMUNITY

## 2021-08-26 RX ORDER — SODIUM CHLORIDE 9 MG/ML
INJECTION, SOLUTION INTRAVENOUS CONTINUOUS
Status: CANCELLED | OUTPATIENT
Start: 2021-08-26

## 2021-08-26 RX ORDER — ONDANSETRON 8 MG/1
8 TABLET, ORALLY DISINTEGRATING ORAL PRN
Status: CANCELLED | OUTPATIENT
Start: 2021-08-26

## 2021-08-26 RX ADMIN — SODIUM CHLORIDE 200 MG: 9 INJECTION, SOLUTION INTRAVENOUS at 14:45

## 2021-08-26 ASSESSMENT — FIBROSIS 4 INDEX: FIB4 SCORE: 1.12

## 2021-08-26 NOTE — PROGRESS NOTES
"Pharmacy Chemotherapy Calculation    Patient Name: Zachery Heath   Dx:Renal Carcinoma   Protocol: Pembrolizumab     *Dosing Reference*  Pembrolizumab 200 mg IV over 30 minutes on Day 1 concurrent with   Axitinib 5-10 mg PO twice daily on Days 1-28   21 day cycle until DP or UT or until up to 24 months of therapy has been completed  NCCN Guidelines for Kidney Cancer V.1.2022.  Rebecca WOMACK et al. N Engl J Med. 2019;380(12):6793-8779.  Annette GALVEZ et al. Eur J Cancer. 2020;131:68-75.    Allergies:  Patient has no known allergies.     /66   Pulse 93   Temp 36.3 °C (97.4 °F) (Temporal)   Resp 18   Ht 1.62 m (5' 3.78\")   Wt 59 kg (130 lb 1.1 oz)   SpO2 93%   BMI 22.48 kg/m²  Body surface area is 1.63 meters squared.    Labs 8/26/21:  ANC~ 3620 Plt = 291k   Hgb = 14.6     SCr = 0.81 mg/dL CrCl ~ 84 mL/min   AST/ALT/ALK = 71/29/295 TBili = 0.5   TSH = 1.4 Free T4 = 0.95     Drug Order   (Drug name, dose, route, IV Fluid & volume, frequency, number of doses) Cycle 1      Previous treatment: n/a     Medication = Pembrolizumab (Keytruda)  Base Dose= 200 mg  Fixed dose, no calculation required   Final Dose = 200 mg  Route = IV  Fluid & Volume = NS 50 mL  Admin Duration = Over 30 minutes          Fixed dose, no calculation required. Okay to treat with final dose.     By my signature below, I confirm this process was performed independently with the BSA and all final chemotherapy dosing calculations congruent. I have reviewed the above chemotherapy order and that my calculation of the final dose and BSA (when applicable) corroborate those calculations of the  pharmacist. Discrepancies of 10% or greater in the written dose have been addressed and documented within the [facility] Progress notes.    Bharti Lemons, PharmD      "

## 2021-08-26 NOTE — PROGRESS NOTES
into [facility] for Cycle 1 of Keytruda / Labs for Renal carcinoma with Lung mets. Pt denied having any new or acute complaints today. Very anxious about treatment. PIV started, had + blood return flushed briskly. Pt given anticancer therapy as prescribed, tolerated well, denied having any complaints during or after infusion. PIV discontinued, bleeding controlled with gauze and coban. Discharge home to self care in [facility]. Appointment confirm for next treatment.

## 2021-08-26 NOTE — PROGRESS NOTES
Chemotherapy Verification - SECONDARY RN       Height = 162 cm  Weight = 59 kg  BSA = 1.63 m^2       Medication: pembrolizumab (KEYTRUDA)  Dose: 200 mg (set dose) Calculated Dose: 200 mg (set dose)                             (In mg/m2, AUC, mg/kg)     I confirm that this process was performed independently.

## 2021-08-26 NOTE — PROGRESS NOTES
"Pharmacy Chemotherapy calculation:    DX: Renal carcinoma    Cycle 1  Previous treatment = N/a    Regimen: Axitinib (not started yet) + Pembrolizumab    *Dosing Reference*    Pembrolizumab 200 mg IV over 30 minutes on Day 1  21-day cycle until DP or UT or until up to 24 months of therapy has been completed  Concurrent with  Axitinib 5-10 mg PO twice daily on Days 1-28  28-day cycle until DP or UT  NCCN Guidelines for Kidney Cancer V.1.2022.  Rebecca WOMACK et al. N Engl J Med. 2019;380(12):116-1127.  Annette GALVEZ et al. Eur J Cancer. 2020;131:68-75.    Allergies: Patient has no known allergies.  /66   Pulse 93   Temp 36.3 °C (97.4 °F) (Temporal)   Resp 18   Ht 1.62 m (5' 3.78\")   Wt 59 kg (130 lb 1.1 oz)   SpO2 93%   BMI 22.48 kg/m²   Body surface area is 1.63 meters squared.    Labs 8/26/21  ANC~ 3620 Plt = 291k   Hgb = 14.6     SCr = 0.81 mg/dL CrCl ~ 83.8 mL/min   LFT's = 71/39/295 TBili = 0.5   TSH = 1.4 Free T4 = 0.95    Pembrolizumab 200 mg fixed dose = 200 mg   <10% difference, okay to treat with final dose = 200 mg IV    Arvin Elkins, PharmD    "

## 2021-08-26 NOTE — PROGRESS NOTES
Chemotherapy Verification - PRIMARY RN      Height = 63.78 in  Weight = 130 lb  BSA = 1.63 m2       Medication: Keytruda  Dose: 200 mg  Calculated Dose: 200 mg / Fixed dose                             (In mg/m2, AUC, mg/kg)     I confirm this process was performed independently with the BSA and all final chemotherapy dosing calculations congruent.  Any discrepancies of 10% or greater have been addressed with the chemotherapy pharmacist. The resolution of the discrepancy has been documented in the EPIC progress notes.

## 2021-08-31 ENCOUNTER — PATIENT OUTREACH (OUTPATIENT)
Dept: OTHER | Facility: MEDICAL CENTER | Age: 56
End: 2021-08-31

## 2021-08-31 NOTE — PROGRESS NOTES
"On August 31, 2021, Oncology Social Worker Yesenia Saucedo contacted pt. via telephone.  Pt. shared he was \"hanging in there.\"  Pt. shared he will be in [place] two times this upcoming month.  Pt. shared emotionally he is \"doing good.\"  Pt. shared financially he was \"hanging in there.\"  OSW Lewis informed pt. about MTM.  Pt. shared he was aware and stated he as not taken advantage of resource for gas reimbursement as his brother is bringing him in.  ROSE Saucedo encouraged pt. to contact MTM if he would be interested in the gas reimbursement.  No other concerns brought forward by pt. at this time.  ROSE Saucedo provided pt. her contact information.    "

## 2021-09-15 RX ORDER — PROCHLORPERAZINE MALEATE 10 MG
10 TABLET ORAL EVERY 6 HOURS PRN
Status: CANCELLED | OUTPATIENT
Start: 2021-09-17

## 2021-09-15 RX ORDER — 0.9 % SODIUM CHLORIDE 0.9 %
10 VIAL (ML) INJECTION PRN
Status: CANCELLED | OUTPATIENT
Start: 2021-09-16

## 2021-09-15 RX ORDER — HEPARIN SODIUM (PORCINE) LOCK FLUSH IV SOLN 100 UNIT/ML 100 UNIT/ML
500 SOLUTION INTRAVENOUS PRN
Status: CANCELLED | OUTPATIENT
Start: 2021-09-17

## 2021-09-15 RX ORDER — ONDANSETRON 2 MG/ML
4 INJECTION INTRAMUSCULAR; INTRAVENOUS PRN
Status: CANCELLED | OUTPATIENT
Start: 2021-09-17

## 2021-09-15 RX ORDER — 0.9 % SODIUM CHLORIDE 0.9 %
VIAL (ML) INJECTION PRN
Status: CANCELLED | OUTPATIENT
Start: 2021-09-17

## 2021-09-15 RX ORDER — 0.9 % SODIUM CHLORIDE 0.9 %
3 VIAL (ML) INJECTION PRN
Status: CANCELLED | OUTPATIENT
Start: 2021-09-16

## 2021-09-15 RX ORDER — 0.9 % SODIUM CHLORIDE 0.9 %
10 VIAL (ML) INJECTION PRN
Status: CANCELLED | OUTPATIENT
Start: 2021-09-17

## 2021-09-15 RX ORDER — ONDANSETRON 8 MG/1
8 TABLET, ORALLY DISINTEGRATING ORAL PRN
Status: CANCELLED | OUTPATIENT
Start: 2021-09-17

## 2021-09-15 RX ORDER — 0.9 % SODIUM CHLORIDE 0.9 %
3 VIAL (ML) INJECTION PRN
Status: CANCELLED | OUTPATIENT
Start: 2021-09-17

## 2021-09-15 RX ORDER — SODIUM CHLORIDE 9 MG/ML
INJECTION, SOLUTION INTRAVENOUS CONTINUOUS
Status: CANCELLED | OUTPATIENT
Start: 2021-09-17

## 2021-09-15 RX ORDER — HEPARIN SODIUM (PORCINE) LOCK FLUSH IV SOLN 100 UNIT/ML 100 UNIT/ML
500 SOLUTION INTRAVENOUS PRN
Status: CANCELLED | OUTPATIENT
Start: 2021-09-16

## 2021-09-15 RX ORDER — 0.9 % SODIUM CHLORIDE 0.9 %
VIAL (ML) INJECTION PRN
Status: CANCELLED | OUTPATIENT
Start: 2021-09-16

## 2021-09-16 ENCOUNTER — OUTPATIENT INFUSION SERVICES (OUTPATIENT)
Dept: ONCOLOGY | Facility: MEDICAL CENTER | Age: 56
End: 2021-09-16
Attending: INTERNAL MEDICINE
Payer: MEDICAID

## 2021-09-16 VITALS
HEIGHT: 64 IN | WEIGHT: 128.09 LBS | SYSTOLIC BLOOD PRESSURE: 132 MMHG | HEART RATE: 91 BPM | DIASTOLIC BLOOD PRESSURE: 89 MMHG | RESPIRATION RATE: 18 BRPM | TEMPERATURE: 97.6 F | BODY MASS INDEX: 21.87 KG/M2 | OXYGEN SATURATION: 92 %

## 2021-09-16 DIAGNOSIS — C64.1 CARCINOMA OF RIGHT KIDNEY (HCC): ICD-10-CM

## 2021-09-16 LAB
ALBUMIN SERPL BCP-MCNC: 3.8 G/DL (ref 3.2–4.9)
ALBUMIN/GLOB SERPL: 1 G/DL
ALP SERPL-CCNC: 313 U/L (ref 30–99)
ALT SERPL-CCNC: 31 U/L (ref 2–50)
ANION GAP SERPL CALC-SCNC: 17 MMOL/L (ref 7–16)
AST SERPL-CCNC: 81 U/L (ref 12–45)
BASOPHILS # BLD AUTO: 1.2 % (ref 0–1.8)
BASOPHILS # BLD: 0.06 K/UL (ref 0–0.12)
BILIRUB SERPL-MCNC: 0.9 MG/DL (ref 0.1–1.5)
BUN SERPL-MCNC: 8 MG/DL (ref 8–22)
CALCIUM SERPL-MCNC: 9.2 MG/DL (ref 8.5–10.5)
CHLORIDE SERPL-SCNC: 87 MMOL/L (ref 96–112)
CO2 SERPL-SCNC: 23 MMOL/L (ref 20–33)
CREAT SERPL-MCNC: 0.66 MG/DL (ref 0.5–1.4)
EOSINOPHIL # BLD AUTO: 0.41 K/UL (ref 0–0.51)
EOSINOPHIL NFR BLD: 8.3 % (ref 0–6.9)
ERYTHROCYTE [DISTWIDTH] IN BLOOD BY AUTOMATED COUNT: 45.3 FL (ref 35.9–50)
GLOBULIN SER CALC-MCNC: 3.9 G/DL (ref 1.9–3.5)
GLUCOSE SERPL-MCNC: 95 MG/DL (ref 65–99)
HCT VFR BLD AUTO: 45.5 % (ref 42–52)
HGB BLD-MCNC: 16.8 G/DL (ref 14–18)
IMM GRANULOCYTES # BLD AUTO: 0.02 K/UL (ref 0–0.11)
IMM GRANULOCYTES NFR BLD AUTO: 0.4 % (ref 0–0.9)
LYMPHOCYTES # BLD AUTO: 0.72 K/UL (ref 1–4.8)
LYMPHOCYTES NFR BLD: 14.6 % (ref 22–41)
MCH RBC QN AUTO: 33.3 PG (ref 27–33)
MCHC RBC AUTO-ENTMCNC: 36.9 G/DL (ref 33.7–35.3)
MCV RBC AUTO: 90.3 FL (ref 81.4–97.8)
MONOCYTES # BLD AUTO: 0.43 K/UL (ref 0–0.85)
MONOCYTES NFR BLD AUTO: 8.7 % (ref 0–13.4)
NEUTROPHILS # BLD AUTO: 3.29 K/UL (ref 1.82–7.42)
NEUTROPHILS NFR BLD: 66.8 % (ref 44–72)
NRBC # BLD AUTO: 0 K/UL
NRBC BLD-RTO: 0 /100 WBC
PLATELET # BLD AUTO: 243 K/UL (ref 164–446)
PMV BLD AUTO: 8.2 FL (ref 9–12.9)
POTASSIUM SERPL-SCNC: 4.1 MMOL/L (ref 3.6–5.5)
PROT SERPL-MCNC: 7.7 G/DL (ref 6–8.2)
RBC # BLD AUTO: 5.04 M/UL (ref 4.7–6.1)
SODIUM SERPL-SCNC: 127 MMOL/L (ref 135–145)
T4 FREE SERPL-MCNC: 1.16 NG/DL (ref 0.93–1.7)
TSH SERPL DL<=0.005 MIU/L-ACNC: 6.59 UIU/ML (ref 0.38–5.33)
WBC # BLD AUTO: 4.9 K/UL (ref 4.8–10.8)

## 2021-09-16 PROCEDURE — 84443 ASSAY THYROID STIM HORMONE: CPT

## 2021-09-16 PROCEDURE — 84439 ASSAY OF FREE THYROXINE: CPT

## 2021-09-16 PROCEDURE — 96413 CHEMO IV INFUSION 1 HR: CPT

## 2021-09-16 PROCEDURE — 85025 COMPLETE CBC W/AUTO DIFF WBC: CPT

## 2021-09-16 PROCEDURE — 80053 COMPREHEN METABOLIC PANEL: CPT

## 2021-09-16 PROCEDURE — 700111 HCHG RX REV CODE 636 W/ 250 OVERRIDE (IP): Performed by: INTERNAL MEDICINE

## 2021-09-16 PROCEDURE — 700105 HCHG RX REV CODE 258: Performed by: INTERNAL MEDICINE

## 2021-09-16 RX ADMIN — SODIUM CHLORIDE 200 MG: 9 INJECTION, SOLUTION INTRAVENOUS at 12:34

## 2021-09-16 ASSESSMENT — FIBROSIS 4 INDEX: FIB4 SCORE: 2.23

## 2021-09-16 NOTE — PROGRESS NOTES
Chemotherapy Verification - SECONDARY RN       Height = 161cm  Weight = 58.1kg  BSA = 1.61m^2       Medication: Pembrolizumab (Keytruda)  Dose: fixed dose 200mg  Calculated Dose: set dose 200mg                                  I confirm that this process was performed independently.

## 2021-09-16 NOTE — PROGRESS NOTES
"Pharmacy Chemotherapy Calculation    Dx:Renal Carcinoma     Protocol: Pembrolizumab     *Dosing Reference*  Pembrolizumab 200 mg IV over 30 minutes on Day 1 concurrent with   Axitinib 5-10 mg PO twice daily on Days 1-28  21 day cycle until DP or UT or until up to 24 months of therapy has been completed  NCCN Guidelines for Kidney Cancer V.1.2022.  Rebecca WOMACK et al. N Engl J Med. 2019;380(12):0348-9605.  Annette GALVEZ, et al. Eur J Cancer. 2020;131:68-75.    Allergies:  Patient has no known allergies.     /89   Pulse 91   Temp 36.4 °C (97.6 °F) (Temporal)   Resp 18   Ht 1.615 m (5' 3.58\")   Wt 58.1 kg (128 lb 1.4 oz)   SpO2 92%   BMI 22.28 kg/m²  Body surface area is 1.61 meters squared.    All labs (9/16/21) and thyroid panel within treatment plan parameters. Abnormal TSH result will be reported to MD.       Drug Order   (Drug name, dose, route, IV Fluid & volume, frequency, number of doses) Cycle 2  Previous treatment: C1 on 8/26/21     Medication = Pembrolizumab (Keytruda)  Base Dose = 200 mg  Fixed dose, no calculation required   Final Dose = 200 mg  Route = IV  Fluid & Volume = NS 50 mL  Admin Duration = Over 30 minutes          Fixed dose, no calculation required. Okay to treat with final dose.     By my signature below, I confirm this process was performed independently with the BSA and all final chemotherapy dosing calculations congruent. I have reviewed the above chemotherapy order and that my calculation of the final dose and BSA (when applicable) corroborate those calculations of the  pharmacist. Discrepancies of 10% or greater in the written dose have been addressed and documented within the [facility] Progress notes.    Tiana Ricardo, PharmD  "

## 2021-09-16 NOTE — PROGRESS NOTES
Pt arrived to [facility], ambulatory, for Keytruda. Pt voices no new complaints. 24g PIV established in the L-FA, positive blood return noted. Labs drawn and reviewed. Call placed to MD office regarding sodium of 127 and TSH of 6.59, orders received to proceed with treatment today and have patient restrict fluids to 1L per day. Orders sent to [facility] for patient to have repeat labs in 1 week. All information provided to patient, pt verbalized understanding. Keytruda infused with no s/sx of adverse reaction. PIV flushed and removed. Pt left IS with no s/sx of distress. Follow up appointment confirmed.

## 2021-09-16 NOTE — PROGRESS NOTES
"Pharmacy Chemotherapy calculation:    DX: Renal carcinoma    Cycle 2  Previous treatment = 8/26/21    Regimen: Axitinib + Pembrolizumab    *Dosing Reference*    Pembrolizumab 200 mg IV over 30 minutes on Day 1  21-day cycle until DP or UT or until up to 24 months of therapy has been completed  Concurrent with  Axitinib 5-10 mg PO twice daily on Days 1-28  28-day cycle until DP or UT  NCCN Guidelines for Kidney Cancer V.1.2022.  Rebecca WOMACK, et al. N Engl J Med. 2019;380(12):116-1127.  Annette GALVEZ et al. Eur J Cancer. 2020;131:68-75.    Allergies: Patient has no known allergies.  /89   Pulse 91   Temp 36.4 °C (97.6 °F) (Temporal)   Resp 18   Ht 1.615 m (5' 3.58\")   Wt 58.1 kg (128 lb 1.4 oz)   SpO2 92%   BMI 22.28 kg/m²   Body surface area is 1.61 meters squared.    Labs 9/16/21  ANC~ 3290 Plt = 243k   Hgb = 16.8     SCr = 0.66 mg/dL CrCl ~ 95.5 mL/min (minimum SCr of 0.7)   LFT's = 81/31/313 TBili = 0.9   TSH = 6.59 Free T4 = 1.16    Pembrolizumab 200 mg fixed dose = 200 mg   <10% difference, okay to treat with final dose = 200 mg IV    Arvin Elkins, PharmD    "

## 2021-09-16 NOTE — PROGRESS NOTES
Chemotherapy Verification - PRIMARY RN      Height = 161.5 cm  Weight = 58.1 kg  BSA = 1.6 m2       Medication: Keytruda  Dose: 200 mg (set dose)  Calculated Dose: 200 mg                             (In mg/m2, AUC, mg/kg)       I confirm this process was performed independently with the BSA and all final chemotherapy dosing calculations congruent.  Any discrepancies of 10% or greater have been addressed with the chemotherapy pharmacist. The resolution of the discrepancy has been documented in the EPIC progress notes.

## 2021-10-03 RX ORDER — 0.9 % SODIUM CHLORIDE 0.9 %
VIAL (ML) INJECTION PRN
Status: CANCELLED | OUTPATIENT
Start: 2021-10-08

## 2021-10-03 RX ORDER — SODIUM CHLORIDE 9 MG/ML
INJECTION, SOLUTION INTRAVENOUS CONTINUOUS
Status: CANCELLED | OUTPATIENT
Start: 2021-10-08

## 2021-10-03 RX ORDER — ONDANSETRON 2 MG/ML
4 INJECTION INTRAMUSCULAR; INTRAVENOUS PRN
Status: CANCELLED | OUTPATIENT
Start: 2021-10-08

## 2021-10-03 RX ORDER — 0.9 % SODIUM CHLORIDE 0.9 %
10 VIAL (ML) INJECTION PRN
Status: CANCELLED | OUTPATIENT
Start: 2021-10-07

## 2021-10-03 RX ORDER — HEPARIN SODIUM (PORCINE) LOCK FLUSH IV SOLN 100 UNIT/ML 100 UNIT/ML
500 SOLUTION INTRAVENOUS PRN
Status: CANCELLED | OUTPATIENT
Start: 2021-10-07

## 2021-10-03 RX ORDER — 0.9 % SODIUM CHLORIDE 0.9 %
VIAL (ML) INJECTION PRN
Status: CANCELLED | OUTPATIENT
Start: 2021-10-07

## 2021-10-03 RX ORDER — 0.9 % SODIUM CHLORIDE 0.9 %
3 VIAL (ML) INJECTION PRN
Status: CANCELLED | OUTPATIENT
Start: 2021-10-08

## 2021-10-03 RX ORDER — 0.9 % SODIUM CHLORIDE 0.9 %
3 VIAL (ML) INJECTION PRN
Status: CANCELLED | OUTPATIENT
Start: 2021-10-07

## 2021-10-03 RX ORDER — HEPARIN SODIUM (PORCINE) LOCK FLUSH IV SOLN 100 UNIT/ML 100 UNIT/ML
500 SOLUTION INTRAVENOUS PRN
Status: CANCELLED | OUTPATIENT
Start: 2021-10-08

## 2021-10-03 RX ORDER — ONDANSETRON 8 MG/1
8 TABLET, ORALLY DISINTEGRATING ORAL PRN
Status: CANCELLED | OUTPATIENT
Start: 2021-10-08

## 2021-10-03 RX ORDER — 0.9 % SODIUM CHLORIDE 0.9 %
10 VIAL (ML) INJECTION PRN
Status: CANCELLED | OUTPATIENT
Start: 2021-10-08

## 2021-10-03 RX ORDER — PROCHLORPERAZINE MALEATE 10 MG
10 TABLET ORAL EVERY 6 HOURS PRN
Status: CANCELLED | OUTPATIENT
Start: 2021-10-08

## 2021-10-07 ENCOUNTER — OUTPATIENT INFUSION SERVICES (OUTPATIENT)
Dept: ONCOLOGY | Facility: MEDICAL CENTER | Age: 56
End: 2021-10-07
Attending: INTERNAL MEDICINE
Payer: MEDICAID

## 2021-10-07 VITALS
RESPIRATION RATE: 18 BRPM | TEMPERATURE: 98.4 F | OXYGEN SATURATION: 92 % | SYSTOLIC BLOOD PRESSURE: 106 MMHG | BODY MASS INDEX: 22.43 KG/M2 | HEART RATE: 100 BPM | DIASTOLIC BLOOD PRESSURE: 71 MMHG | HEIGHT: 64 IN | WEIGHT: 131.39 LBS

## 2021-10-07 DIAGNOSIS — C64.1 CARCINOMA OF RIGHT KIDNEY (HCC): ICD-10-CM

## 2021-10-07 LAB
ALBUMIN SERPL BCP-MCNC: 3.9 G/DL (ref 3.2–4.9)
ALBUMIN/GLOB SERPL: 1.1 G/DL
ALP SERPL-CCNC: 255 U/L (ref 30–99)
ALT SERPL-CCNC: 15 U/L (ref 2–50)
ANION GAP SERPL CALC-SCNC: 15 MMOL/L (ref 7–16)
AST SERPL-CCNC: 35 U/L (ref 12–45)
BASOPHILS # BLD AUTO: 1.2 % (ref 0–1.8)
BASOPHILS # BLD: 0.08 K/UL (ref 0–0.12)
BILIRUB SERPL-MCNC: 0.4 MG/DL (ref 0.1–1.5)
BUN SERPL-MCNC: 8 MG/DL (ref 8–22)
CALCIUM SERPL-MCNC: 9.7 MG/DL (ref 8.5–10.5)
CHLORIDE SERPL-SCNC: 97 MMOL/L (ref 96–112)
CO2 SERPL-SCNC: 24 MMOL/L (ref 20–33)
CREAT SERPL-MCNC: 0.79 MG/DL (ref 0.5–1.4)
EOSINOPHIL # BLD AUTO: 0.3 K/UL (ref 0–0.51)
EOSINOPHIL NFR BLD: 4.6 % (ref 0–6.9)
ERYTHROCYTE [DISTWIDTH] IN BLOOD BY AUTOMATED COUNT: 55.3 FL (ref 35.9–50)
GLOBULIN SER CALC-MCNC: 3.6 G/DL (ref 1.9–3.5)
GLUCOSE SERPL-MCNC: 116 MG/DL (ref 65–99)
HCT VFR BLD AUTO: 41.2 % (ref 42–52)
HGB BLD-MCNC: 15.2 G/DL (ref 14–18)
IMM GRANULOCYTES # BLD AUTO: 0.03 K/UL (ref 0–0.11)
IMM GRANULOCYTES NFR BLD AUTO: 0.5 % (ref 0–0.9)
LYMPHOCYTES # BLD AUTO: 0.79 K/UL (ref 1–4.8)
LYMPHOCYTES NFR BLD: 12.1 % (ref 22–41)
MCH RBC QN AUTO: 36.5 PG (ref 27–33)
MCHC RBC AUTO-ENTMCNC: 36.9 G/DL (ref 33.7–35.3)
MCV RBC AUTO: 99 FL (ref 81.4–97.8)
MONOCYTES # BLD AUTO: 0.46 K/UL (ref 0–0.85)
MONOCYTES NFR BLD AUTO: 7.1 % (ref 0–13.4)
NEUTROPHILS # BLD AUTO: 4.85 K/UL (ref 1.82–7.42)
NEUTROPHILS NFR BLD: 74.5 % (ref 44–72)
NRBC # BLD AUTO: 0 K/UL
NRBC BLD-RTO: 0 /100 WBC
PLATELET # BLD AUTO: 327 K/UL (ref 164–446)
PMV BLD AUTO: 8.2 FL (ref 9–12.9)
POTASSIUM SERPL-SCNC: 4.4 MMOL/L (ref 3.6–5.5)
PROT SERPL-MCNC: 7.5 G/DL (ref 6–8.2)
RBC # BLD AUTO: 4.16 M/UL (ref 4.7–6.1)
SODIUM SERPL-SCNC: 136 MMOL/L (ref 135–145)
T4 FREE SERPL-MCNC: 0.89 NG/DL (ref 0.93–1.7)
TSH SERPL DL<=0.005 MIU/L-ACNC: 3.02 UIU/ML (ref 0.38–5.33)
WBC # BLD AUTO: 6.5 K/UL (ref 4.8–10.8)

## 2021-10-07 PROCEDURE — 96413 CHEMO IV INFUSION 1 HR: CPT

## 2021-10-07 PROCEDURE — 700111 HCHG RX REV CODE 636 W/ 250 OVERRIDE (IP): Performed by: INTERNAL MEDICINE

## 2021-10-07 PROCEDURE — 84439 ASSAY OF FREE THYROXINE: CPT

## 2021-10-07 PROCEDURE — 84443 ASSAY THYROID STIM HORMONE: CPT

## 2021-10-07 PROCEDURE — 80053 COMPREHEN METABOLIC PANEL: CPT

## 2021-10-07 PROCEDURE — 700105 HCHG RX REV CODE 258: Performed by: INTERNAL MEDICINE

## 2021-10-07 PROCEDURE — 85025 COMPLETE CBC W/AUTO DIFF WBC: CPT

## 2021-10-07 RX ADMIN — SODIUM CHLORIDE 200 MG: 9 INJECTION, SOLUTION INTRAVENOUS at 12:15

## 2021-10-07 ASSESSMENT — FIBROSIS 4 INDEX: FIB4 SCORE: 3.41

## 2021-10-07 NOTE — PROGRESS NOTES
Pt arrived to [facility], ambulatory, for Keytruda infusion. Pt voices no complaints. 22g PIV established in the L-FA, positive blood return noted. Labs drawn and reviewed, pt within parameters to treat today. Keytruda infused with no s/sx of adverse reaction. PIV flushed and removed. Pt left IS with no s/sx of distress. Follow up appointment confirmed.

## 2021-10-07 NOTE — PROGRESS NOTES
"Pharmacy Chemotherapy Calculation    Dx:Renal Carcinoma     Protocol: Pembrolizumab     *Dosing Reference*  Pembrolizumab 200 mg IV over 30 minutes on Day 1 concurrent with   Axitinib 5-10 mg PO twice daily on Days 1-28   21 day cycle until DP or UT or until up to 24 months of therapy has been completed  NCCN Guidelines for Kidney Cancer V.1.2022.  Rebecca WOMACK et al. N Engl J Med. 2019;380(12):9380-3759.  Annette GALVEZ, et al. Eur J Cancer. 2020;131:68-75.    Allergies:  Patient has no known allergies.     /71   Pulse 100   Temp 36.9 °C (98.4 °F) (Temporal)   Resp 18   Ht 1.625 m (5' 3.98\")   Wt 59.6 kg (131 lb 6.3 oz)   SpO2 92%   BMI 22.57 kg/m²  Body surface area is 1.64 meters squared.    Labs 10/7/21:  ANC~ 4850 Plt = 327k   Hgb = 15.2     SCr = 0.79 mg/dL CrCl ~ 87 mL/min   AST/ALT/ALK = 35/15/255 TBili = 0.4  TSH = pending Free T4 = pending     Drug Order   (Drug name, dose, route, IV Fluid & volume, frequency, number of doses) Cycle 3  Previous treatment: C2 on 9/16/21     Medication = Pembrolizumab (Keytruda)  Base Dose = 200 mg  Fixed dose, no calculation required   Final Dose = 200 mg  Route = IV  Fluid & Volume = NS 50 mL  Admin Duration = Over 30 minutes          Fixed dose, no calculation required. Okay to treat with final dose.     By my signature below, I confirm this process was performed independently with the BSA and all final chemotherapy dosing calculations congruent. I have reviewed the above chemotherapy order and that my calculation of the final dose and BSA (when applicable) corroborate those calculations of the  pharmacist. Discrepancies of 10% or greater in the written dose have been addressed and documented within the [facility] Progress notes.    Bharti Lemons, PharmD  "

## 2021-10-07 NOTE — PROGRESS NOTES
Chemotherapy Verification - SECONDARY RN       Height = 162.5 cm  Weight = 59.6 kg  BSA = 1.64 m^2       Medication: Pembrolizumab  Dose: SET DOSE  Calculated Dose: 200 mg                             (In mg/m2, AUC, mg/kg)     I confirm that this process was performed independently.

## 2021-10-07 NOTE — PROGRESS NOTES
"Pharmacy Chemotherapy calculation:    DX: Renal carcinoma    Cycle 3  Previous treatment = 9/16/21    Regimen: Axitinib + Pembrolizumab    *Dosing Reference*  Pembrolizumab 200 mg IV over 30 minutes on Day 1  21-day cycle until DP or UT or until up to 24 months of therapy has been completed  Concurrent with  Axitinib 5-10 mg PO twice daily on Days 1-28  28-day cycle until DP or UT  NCCN Guidelines for Kidney Cancer V.1.2022.  Rebecca WOMACK, et al. N Engl J Med. 2019;380(12):116-1127.  Annette GALVEZ et al. Eur J Cancer. 2020;131:68-75.    Allergies: Patient has no known allergies.  /71   Pulse 100   Temp 36.9 °C (98.4 °F) (Temporal)   Resp 18   Ht 1.625 m (5' 3.98\")   Wt 59.6 kg (131 lb 6.3 oz)   SpO2 92%   BMI 22.57 kg/m²   Body surface area is 1.64 meters squared.    Labs 10/7/21  ANC~ 4850 Plt = 327k   Hgb = 15.2     SCr = 0.79 mg/dL CrCl ~ 86.7 mL/min   LFT's = 35/15/255 TBili = 0.4   TSH = pending Free T4 = pending    Pembrolizumab 200 mg fixed dose = 200 mg   <10% difference, okay to treat with final dose = 200 mg IV    Arvin Elkins, PharmD    "

## 2021-10-07 NOTE — PROGRESS NOTES
Chemotherapy Verification - PRIMARY RN      Height = 162.5 cm  Weight = 59.6 kg  BSA = 1.64 m2       Medication: Keytruda  Dose: 200 mg (set dose) Calculated Dose: 200 mg                             (In mg/m2, AUC, mg/kg)       I confirm this process was performed independently with the BSA and all final chemotherapy dosing calculations congruent.  Any discrepancies of 10% or greater have been addressed with the chemotherapy pharmacist. The resolution of the discrepancy has been documented in the EPIC progress notes.

## 2021-10-28 ENCOUNTER — OUTPATIENT INFUSION SERVICES (OUTPATIENT)
Dept: ONCOLOGY | Facility: MEDICAL CENTER | Age: 56
End: 2021-10-28
Attending: INTERNAL MEDICINE
Payer: MEDICAID

## 2021-10-28 VITALS
HEART RATE: 82 BPM | SYSTOLIC BLOOD PRESSURE: 117 MMHG | BODY MASS INDEX: 22.06 KG/M2 | OXYGEN SATURATION: 95 % | RESPIRATION RATE: 18 BRPM | DIASTOLIC BLOOD PRESSURE: 81 MMHG | HEIGHT: 64 IN | TEMPERATURE: 97.4 F | WEIGHT: 129.19 LBS

## 2021-10-28 DIAGNOSIS — C64.1 CARCINOMA OF RIGHT KIDNEY (HCC): ICD-10-CM

## 2021-10-28 LAB
ALBUMIN SERPL BCP-MCNC: 4.3 G/DL (ref 3.2–4.9)
ALBUMIN/GLOB SERPL: 1.2 G/DL
ALP SERPL-CCNC: 296 U/L (ref 30–99)
ALT SERPL-CCNC: 27 U/L (ref 2–50)
ANION GAP SERPL CALC-SCNC: 18 MMOL/L (ref 7–16)
AST SERPL-CCNC: 74 U/L (ref 12–45)
BASOPHILS # BLD AUTO: 1.2 % (ref 0–1.8)
BASOPHILS # BLD: 0.06 K/UL (ref 0–0.12)
BILIRUB SERPL-MCNC: 0.6 MG/DL (ref 0.1–1.5)
BUN SERPL-MCNC: 5 MG/DL (ref 8–22)
CALCIUM SERPL-MCNC: 9.3 MG/DL (ref 8.5–10.5)
CHLORIDE SERPL-SCNC: 96 MMOL/L (ref 96–112)
CO2 SERPL-SCNC: 19 MMOL/L (ref 20–33)
CREAT SERPL-MCNC: 0.66 MG/DL (ref 0.5–1.4)
EOSINOPHIL # BLD AUTO: 0.33 K/UL (ref 0–0.51)
EOSINOPHIL NFR BLD: 6.4 % (ref 0–6.9)
ERYTHROCYTE [DISTWIDTH] IN BLOOD BY AUTOMATED COUNT: 55.6 FL (ref 35.9–50)
GLOBULIN SER CALC-MCNC: 3.5 G/DL (ref 1.9–3.5)
GLUCOSE SERPL-MCNC: 109 MG/DL (ref 65–99)
HCT VFR BLD AUTO: 46.3 % (ref 42–52)
HGB BLD-MCNC: 16.2 G/DL (ref 14–18)
IMM GRANULOCYTES # BLD AUTO: 0.01 K/UL (ref 0–0.11)
IMM GRANULOCYTES NFR BLD AUTO: 0.2 % (ref 0–0.9)
LYMPHOCYTES # BLD AUTO: 1.16 K/UL (ref 1–4.8)
LYMPHOCYTES NFR BLD: 22.4 % (ref 22–41)
MCH RBC QN AUTO: 33.5 PG (ref 27–33)
MCHC RBC AUTO-ENTMCNC: 35 G/DL (ref 33.7–35.3)
MCV RBC AUTO: 95.9 FL (ref 81.4–97.8)
MONOCYTES # BLD AUTO: 0.57 K/UL (ref 0–0.85)
MONOCYTES NFR BLD AUTO: 11 % (ref 0–13.4)
NEUTROPHILS # BLD AUTO: 3.06 K/UL (ref 1.82–7.42)
NEUTROPHILS NFR BLD: 58.8 % (ref 44–72)
NRBC # BLD AUTO: 0 K/UL
NRBC BLD-RTO: 0 /100 WBC
PLATELET # BLD AUTO: 242 K/UL (ref 164–446)
PMV BLD AUTO: 8 FL (ref 9–12.9)
POTASSIUM SERPL-SCNC: 4 MMOL/L (ref 3.6–5.5)
PROT SERPL-MCNC: 7.8 G/DL (ref 6–8.2)
RBC # BLD AUTO: 4.83 M/UL (ref 4.7–6.1)
SODIUM SERPL-SCNC: 133 MMOL/L (ref 135–145)
T4 FREE SERPL-MCNC: 1.1 NG/DL (ref 0.93–1.7)
TSH SERPL DL<=0.005 MIU/L-ACNC: 2.85 UIU/ML (ref 0.38–5.33)
WBC # BLD AUTO: 5.2 K/UL (ref 4.8–10.8)

## 2021-10-28 PROCEDURE — 84439 ASSAY OF FREE THYROXINE: CPT

## 2021-10-28 PROCEDURE — 84443 ASSAY THYROID STIM HORMONE: CPT

## 2021-10-28 PROCEDURE — 85025 COMPLETE CBC W/AUTO DIFF WBC: CPT

## 2021-10-28 PROCEDURE — 96413 CHEMO IV INFUSION 1 HR: CPT

## 2021-10-28 PROCEDURE — 700111 HCHG RX REV CODE 636 W/ 250 OVERRIDE (IP): Performed by: INTERNAL MEDICINE

## 2021-10-28 PROCEDURE — 700105 HCHG RX REV CODE 258: Performed by: INTERNAL MEDICINE

## 2021-10-28 PROCEDURE — 80053 COMPREHEN METABOLIC PANEL: CPT

## 2021-10-28 RX ORDER — 0.9 % SODIUM CHLORIDE 0.9 %
10 VIAL (ML) INJECTION PRN
Status: CANCELLED | OUTPATIENT
Start: 2021-10-28

## 2021-10-28 RX ORDER — 0.9 % SODIUM CHLORIDE 0.9 %
VIAL (ML) INJECTION PRN
Status: CANCELLED | OUTPATIENT
Start: 2021-10-29

## 2021-10-28 RX ORDER — PROCHLORPERAZINE MALEATE 10 MG
10 TABLET ORAL EVERY 6 HOURS PRN
Status: CANCELLED | OUTPATIENT
Start: 2021-10-29

## 2021-10-28 RX ORDER — HEPARIN SODIUM (PORCINE) LOCK FLUSH IV SOLN 100 UNIT/ML 100 UNIT/ML
500 SOLUTION INTRAVENOUS PRN
Status: CANCELLED | OUTPATIENT
Start: 2021-10-28

## 2021-10-28 RX ORDER — ONDANSETRON 2 MG/ML
4 INJECTION INTRAMUSCULAR; INTRAVENOUS PRN
Status: CANCELLED | OUTPATIENT
Start: 2021-10-29

## 2021-10-28 RX ORDER — 0.9 % SODIUM CHLORIDE 0.9 %
VIAL (ML) INJECTION PRN
Status: CANCELLED | OUTPATIENT
Start: 2021-10-28

## 2021-10-28 RX ORDER — HEPARIN SODIUM (PORCINE) LOCK FLUSH IV SOLN 100 UNIT/ML 100 UNIT/ML
500 SOLUTION INTRAVENOUS PRN
Status: CANCELLED | OUTPATIENT
Start: 2021-10-29

## 2021-10-28 RX ORDER — 0.9 % SODIUM CHLORIDE 0.9 %
10 VIAL (ML) INJECTION PRN
Status: CANCELLED | OUTPATIENT
Start: 2021-10-29

## 2021-10-28 RX ORDER — SODIUM CHLORIDE 9 MG/ML
INJECTION, SOLUTION INTRAVENOUS CONTINUOUS
Status: CANCELLED | OUTPATIENT
Start: 2021-10-29

## 2021-10-28 RX ORDER — 0.9 % SODIUM CHLORIDE 0.9 %
3 VIAL (ML) INJECTION PRN
Status: CANCELLED | OUTPATIENT
Start: 2021-10-29

## 2021-10-28 RX ORDER — ONDANSETRON 8 MG/1
8 TABLET, ORALLY DISINTEGRATING ORAL PRN
Status: CANCELLED | OUTPATIENT
Start: 2021-10-29

## 2021-10-28 RX ORDER — 0.9 % SODIUM CHLORIDE 0.9 %
3 VIAL (ML) INJECTION PRN
Status: CANCELLED | OUTPATIENT
Start: 2021-10-28

## 2021-10-28 RX ADMIN — SODIUM CHLORIDE 200 MG: 9 INJECTION, SOLUTION INTRAVENOUS at 12:35

## 2021-10-28 ASSESSMENT — FIBROSIS 4 INDEX: FIB4 SCORE: 1.58

## 2021-10-28 NOTE — PROGRESS NOTES
"Pharmacy Chemotherapy Calculation    Dx:Renal Carcinoma     Protocol: Pembrolizumab     *Dosing Reference*  Pembrolizumab 200 mg IV over 30 minutes on Day 1 concurrent with   Axitinib 5-10 mg PO twice daily on Days 1-28   21 day cycle until DP or UT or until up to 24 months of therapy has been completed  NCCN Guidelines for Kidney Cancer V.1.2022.  Rebecca WOMACK et al. N Engl J Med. 2019;380(12):6648-5599.  Annette GALVEZ, et al. Eur J Cancer. 2020;131:68-75.    Allergies:  Patient has no known allergies.     /81   Pulse 82   Temp 36.3 °C (97.4 °F) (Temporal)   Resp 18   Ht 1.63 m (5' 4.17\")   Wt 58.6 kg (129 lb 3 oz)   SpO2 95%   BMI 22.06 kg/m²  Body surface area is 1.63 meters squared.    Labs 10/28/21:  ANC~ 3060 Plt = 242k   Hgb = 16.2     SCr = 0.66 mg/dL CrCl ~ 97 mL/min   LFT's = 74/27/296 TBili = 0.6   TSH = 2.85 Free T4 = 1.10     Drug Order   (Drug name, dose, route, IV Fluid & volume, frequency, number of doses) Cycle 4  Previous treatment: C3 on 10/7/21     Medication = Pembrolizumab (Keytruda)  Base Dose = 200 mg  Fixed dose, no calculation required   Final Dose = 200 mg  Route = IV  Fluid & Volume = NS 50 mL  Admin Duration = Over 30 minutes          Fixed dose, no calculation required. Okay to treat with final dose.     By my signature below, I confirm this process was performed independently with the BSA and all final chemotherapy dosing calculations congruent. I have reviewed the above chemotherapy order and that my calculation of the final dose and BSA (when applicable) corroborate those calculations of the  pharmacist. Discrepancies of 10% or greater in the written dose have been addressed and documented within the [facility] Progress notes.    Bharti Lemons, PharmD  "

## 2021-10-28 NOTE — PROGRESS NOTES
"Pharmacy Chemotherapy calculation:    DX: Renal carcinoma    Cycle 4  Previous treatment = 10/7/21    Regimen: Axitinib + Pembrolizumab    *Dosing Reference*  Pembrolizumab 200 mg IV over 30 minutes on Day 1  21-day cycle until DP or UT or until up to 24 months of therapy has been completed  Concurrent with  Axitinib 5-10 mg PO twice daily on Days 1-28  28-day cycle until DP or UT  NCCN Guidelines for Kidney Cancer V.1.2022.  Rebecca WOMACK, et al. N Engl J Med. 2019;380(12):116-1127.  Annette GALVEZ et al. Eur J Cancer. 2020;131:68-75.    Allergies: Patient has no known allergies.  /81   Pulse 82   Temp 36.3 °C (97.4 °F) (Temporal)   Resp 18   Ht 1.63 m (5' 4.17\")   Wt 58.6 kg (129 lb 3 oz)   SpO2 95%   BMI 22.06 kg/m²   Body surface area is 1.63 meters squared.    Labs 10/28/21  ANC~ 3060 Plt = 242k   Hgb = 16.2     SCr = 0.66 mg/dL CrCl ~ 96.1 mL/min (minimum SCr of 0.7)   LFT's = 74/27/296 TBili = 0.6   TSH = 2.85 Free T4 = 1.1    Pembrolizumab 200 mg fixed dose = 200 mg   <10% difference, okay to treat with final dose = 200 mg IV    Arvin Elkins, PharmD    "

## 2021-10-28 NOTE — PROGRESS NOTES
Chemotherapy Verification - SECONDARY RN       Height = 163 cm  Weight = 58.6 kg  BSA = 1.63 m^2       Medication: pembrolizumab (KEYTRUDA)  Dose: 200 mg (set dose)  Calculated Dose: 200 mg (set dose)                             (In mg/m2, AUC, mg/kg)     I confirm that this process was performed independently.

## 2021-10-28 NOTE — PROGRESS NOTES
Pt arrived to [facility] for Keytruda infusion. POC discussed with pt and he agrees with plan. PIV established, brisk blood return noted. Labs drawn as ordered. Results reviewed, ok to proceed with treatment. Pt medicated per MAR. Pt tolerated treatment without s/s adverse reaction. PIV dc'd catheter tip intact, gauze and coban dressing applied. Pt discharged to self care, [facility]. Pt's next appt 11/18/2021.

## 2021-10-28 NOTE — PROGRESS NOTES
Chemotherapy Verification - PRIMARY RN    D1C4    Height = 163cm  Weight = 58.6kg  BSA = 1.63m^2       Medication: Pembrolizumab (Keytruda)  Dose: set dose 200mg  Calculated Dose: set dose 200mg                                 I confirm this process was performed independently with the BSA and all final chemotherapy dosing calculations congruent.  Any discrepancies of 10% or greater have been addressed with the chemotherapy pharmacist. The resolution of the discrepancy has been documented in the EPIC progress notes.

## 2021-11-18 ENCOUNTER — OUTPATIENT INFUSION SERVICES (OUTPATIENT)
Dept: ONCOLOGY | Facility: MEDICAL CENTER | Age: 56
End: 2021-11-18
Attending: INTERNAL MEDICINE
Payer: MEDICAID

## 2021-11-18 VITALS
OXYGEN SATURATION: 91 % | TEMPERATURE: 97.5 F | WEIGHT: 128.97 LBS | HEART RATE: 77 BPM | HEIGHT: 64 IN | RESPIRATION RATE: 18 BRPM | SYSTOLIC BLOOD PRESSURE: 108 MMHG | BODY MASS INDEX: 22.02 KG/M2 | DIASTOLIC BLOOD PRESSURE: 74 MMHG

## 2021-11-18 DIAGNOSIS — C64.1 CARCINOMA OF RIGHT KIDNEY (HCC): ICD-10-CM

## 2021-11-18 LAB
ALBUMIN SERPL BCP-MCNC: 3.9 G/DL (ref 3.2–4.9)
ALBUMIN/GLOB SERPL: 1.1 G/DL
ALP SERPL-CCNC: 284 U/L (ref 30–99)
ALT SERPL-CCNC: 54 U/L (ref 2–50)
ANION GAP SERPL CALC-SCNC: 18 MMOL/L (ref 7–16)
AST SERPL-CCNC: 123 U/L (ref 12–45)
BASOPHILS # BLD AUTO: 1.3 % (ref 0–1.8)
BASOPHILS # BLD: 0.06 K/UL (ref 0–0.12)
BILIRUB SERPL-MCNC: 0.5 MG/DL (ref 0.1–1.5)
BUN SERPL-MCNC: 7 MG/DL (ref 8–22)
CALCIUM SERPL-MCNC: 8.8 MG/DL (ref 8.5–10.5)
CHLORIDE SERPL-SCNC: 95 MMOL/L (ref 96–112)
CO2 SERPL-SCNC: 21 MMOL/L (ref 20–33)
CREAT SERPL-MCNC: 0.72 MG/DL (ref 0.5–1.4)
EOSINOPHIL # BLD AUTO: 0.36 K/UL (ref 0–0.51)
EOSINOPHIL NFR BLD: 7.6 % (ref 0–6.9)
ERYTHROCYTE [DISTWIDTH] IN BLOOD BY AUTOMATED COUNT: 55 FL (ref 35.9–50)
GLOBULIN SER CALC-MCNC: 3.6 G/DL (ref 1.9–3.5)
GLUCOSE SERPL-MCNC: 85 MG/DL (ref 65–99)
HCT VFR BLD AUTO: 46.6 % (ref 42–52)
HGB BLD-MCNC: 16.7 G/DL (ref 14–18)
IMM GRANULOCYTES # BLD AUTO: 0.01 K/UL (ref 0–0.11)
IMM GRANULOCYTES NFR BLD AUTO: 0.2 % (ref 0–0.9)
LYMPHOCYTES # BLD AUTO: 1.07 K/UL (ref 1–4.8)
LYMPHOCYTES NFR BLD: 22.6 % (ref 22–41)
MCH RBC QN AUTO: 35.2 PG (ref 27–33)
MCHC RBC AUTO-ENTMCNC: 35.8 G/DL (ref 33.7–35.3)
MCV RBC AUTO: 98.1 FL (ref 81.4–97.8)
MONOCYTES # BLD AUTO: 0.36 K/UL (ref 0–0.85)
MONOCYTES NFR BLD AUTO: 7.6 % (ref 0–13.4)
NEUTROPHILS # BLD AUTO: 2.87 K/UL (ref 1.82–7.42)
NEUTROPHILS NFR BLD: 60.7 % (ref 44–72)
NRBC # BLD AUTO: 0 K/UL
NRBC BLD-RTO: 0 /100 WBC
PLATELET # BLD AUTO: 203 K/UL (ref 164–446)
PMV BLD AUTO: 8.4 FL (ref 9–12.9)
POTASSIUM SERPL-SCNC: 4.6 MMOL/L (ref 3.6–5.5)
PROT SERPL-MCNC: 7.5 G/DL (ref 6–8.2)
RBC # BLD AUTO: 4.75 M/UL (ref 4.7–6.1)
SODIUM SERPL-SCNC: 134 MMOL/L (ref 135–145)
T4 FREE SERPL-MCNC: 0.81 NG/DL (ref 0.93–1.7)
TSH SERPL DL<=0.005 MIU/L-ACNC: 6.56 UIU/ML (ref 0.38–5.33)
WBC # BLD AUTO: 4.7 K/UL (ref 4.8–10.8)

## 2021-11-18 PROCEDURE — 36415 COLL VENOUS BLD VENIPUNCTURE: CPT

## 2021-11-18 PROCEDURE — 80053 COMPREHEN METABOLIC PANEL: CPT

## 2021-11-18 PROCEDURE — 85025 COMPLETE CBC W/AUTO DIFF WBC: CPT

## 2021-11-18 PROCEDURE — 84443 ASSAY THYROID STIM HORMONE: CPT

## 2021-11-18 PROCEDURE — 84439 ASSAY OF FREE THYROXINE: CPT

## 2021-11-18 RX ORDER — 0.9 % SODIUM CHLORIDE 0.9 %
10 VIAL (ML) INJECTION PRN
Status: CANCELLED | OUTPATIENT
Start: 2021-11-18

## 2021-11-18 RX ORDER — SODIUM CHLORIDE 9 MG/ML
INJECTION, SOLUTION INTRAVENOUS CONTINUOUS
Status: CANCELLED | OUTPATIENT
Start: 2021-11-19

## 2021-11-18 RX ORDER — 0.9 % SODIUM CHLORIDE 0.9 %
VIAL (ML) INJECTION PRN
Status: CANCELLED | OUTPATIENT
Start: 2021-11-19

## 2021-11-18 RX ORDER — HEPARIN SODIUM (PORCINE) LOCK FLUSH IV SOLN 100 UNIT/ML 100 UNIT/ML
500 SOLUTION INTRAVENOUS PRN
Status: CANCELLED | OUTPATIENT
Start: 2021-11-18

## 2021-11-18 RX ORDER — LORATADINE 10 MG/1
TABLET ORAL
COMMUNITY
Start: 2021-11-08

## 2021-11-18 RX ORDER — PEMBROLIZUMAB 25 MG/ML
INJECTION, SOLUTION INTRAVENOUS
COMMUNITY
Start: 2021-08-26

## 2021-11-18 RX ORDER — 0.9 % SODIUM CHLORIDE 0.9 %
3 VIAL (ML) INJECTION PRN
Status: CANCELLED | OUTPATIENT
Start: 2021-11-18

## 2021-11-18 RX ORDER — 0.9 % SODIUM CHLORIDE 0.9 %
10 VIAL (ML) INJECTION PRN
Status: CANCELLED | OUTPATIENT
Start: 2021-11-19

## 2021-11-18 RX ORDER — 0.9 % SODIUM CHLORIDE 0.9 %
VIAL (ML) INJECTION PRN
Status: CANCELLED | OUTPATIENT
Start: 2021-11-18

## 2021-11-18 RX ORDER — 0.9 % SODIUM CHLORIDE 0.9 %
3 VIAL (ML) INJECTION PRN
Status: CANCELLED | OUTPATIENT
Start: 2021-11-19

## 2021-11-18 RX ORDER — ONDANSETRON 2 MG/ML
4 INJECTION INTRAMUSCULAR; INTRAVENOUS PRN
Status: CANCELLED | OUTPATIENT
Start: 2021-11-19

## 2021-11-18 RX ORDER — HEPARIN SODIUM (PORCINE) LOCK FLUSH IV SOLN 100 UNIT/ML 100 UNIT/ML
500 SOLUTION INTRAVENOUS PRN
Status: CANCELLED | OUTPATIENT
Start: 2021-11-19

## 2021-11-18 RX ORDER — ONDANSETRON 8 MG/1
8 TABLET, ORALLY DISINTEGRATING ORAL PRN
Status: CANCELLED | OUTPATIENT
Start: 2021-11-19

## 2021-11-18 RX ORDER — PROCHLORPERAZINE MALEATE 10 MG
10 TABLET ORAL EVERY 6 HOURS PRN
Status: CANCELLED | OUTPATIENT
Start: 2021-11-19

## 2021-11-18 ASSESSMENT — FIBROSIS 4 INDEX: FIB4 SCORE: 3.35

## 2021-11-18 NOTE — PROGRESS NOTES
"Pharmacy Chemotherapy calculation:  Chemotherapy cancelled today due to elevated AST    DX: Renal carcinoma    Cycle 5  Previous treatment = 10/28/21    Regimen: Axitinib + Pembrolizumab    *Dosing Reference*  Pembrolizumab 200 mg IV over 30 minutes on Day 1  21-day cycle until DP or UT or until up to 24 months of therapy has been completed  Concurrent with  Axitinib 5-10 mg PO twice daily on Days 1-28  28-day cycle until DP or UT  NCCN Guidelines for Kidney Cancer V.1.2022.  Rebecca WOMACK et al. N Engl J Med. 2019;380(12):116-1127.  Annette GALVEZ et al. Eur J Cancer. 2020;131:68-75.    Allergies: Patient has no known allergies.  /74   Pulse 77   Temp 36.4 °C (97.5 °F) (Temporal)   Resp 18   Ht 1.615 m (5' 3.58\")   Wt 58.5 kg (128 lb 15.5 oz)   SpO2 91%   BMI 22.43 kg/m²   Body surface area is 1.62 meters squared.     Labs from 11/18/21 reviewed- results within treatment parameters   AST elevated to less than 3x ULN.  D/W Jeff MEADOWS:  HOLD pembrolizumab today, check labs in one week.   Thyroid labs to be reported to MD Coel Stoll, PharmD    "

## 2021-11-18 NOTE — PROGRESS NOTES
Pt presents to [facility] for pembrolizumab infusion. Established PIV in RAC; brisk blood return observed and pt tolerated well. Labs drawn and borderline on treatment parameters. This RN updated Dr. Aguilar about elevated liver and thyroid labs; orders given to Beka duran to hold treatment today and recheck CMP in one week. This RN left message with PRABHJOT Downing to fax over lab orders to [facility]. Pt updated and educated on the need to hold his treatment today; all questions answered at this time. PIV flushed and brisk blood return observed before removing; gauze/coband dressing applied. Next appointment confirmed and education provided. Pt discharged to self care with all personal belongings and in NAD.

## 2021-12-09 ENCOUNTER — OUTPATIENT INFUSION SERVICES (OUTPATIENT)
Dept: ONCOLOGY | Facility: MEDICAL CENTER | Age: 56
End: 2021-12-09
Attending: INTERNAL MEDICINE
Payer: MEDICAID

## 2021-12-09 VITALS
RESPIRATION RATE: 18 BRPM | HEART RATE: 89 BPM | DIASTOLIC BLOOD PRESSURE: 68 MMHG | OXYGEN SATURATION: 93 % | SYSTOLIC BLOOD PRESSURE: 106 MMHG | TEMPERATURE: 97.5 F | HEIGHT: 63 IN | BODY MASS INDEX: 23.52 KG/M2 | WEIGHT: 132.72 LBS

## 2021-12-09 DIAGNOSIS — C64.1 CARCINOMA OF RIGHT KIDNEY (HCC): ICD-10-CM

## 2021-12-09 LAB
ALBUMIN SERPL BCP-MCNC: 4.2 G/DL (ref 3.2–4.9)
ALBUMIN/GLOB SERPL: 1.6 G/DL
ALP SERPL-CCNC: 170 U/L (ref 30–99)
ALT SERPL-CCNC: 22 U/L (ref 2–50)
ANION GAP SERPL CALC-SCNC: 17 MMOL/L (ref 7–16)
AST SERPL-CCNC: 48 U/L (ref 12–45)
BASOPHILS # BLD AUTO: 1.7 % (ref 0–1.8)
BASOPHILS # BLD: 0.12 K/UL (ref 0–0.12)
BILIRUB SERPL-MCNC: 0.4 MG/DL (ref 0.1–1.5)
BUN SERPL-MCNC: 7 MG/DL (ref 8–22)
CALCIUM SERPL-MCNC: 9.2 MG/DL (ref 8.5–10.5)
CHLORIDE SERPL-SCNC: 98 MMOL/L (ref 96–112)
CO2 SERPL-SCNC: 20 MMOL/L (ref 20–33)
CREAT SERPL-MCNC: 0.65 MG/DL (ref 0.5–1.4)
EOSINOPHIL # BLD AUTO: 0.34 K/UL (ref 0–0.51)
EOSINOPHIL NFR BLD: 4.8 % (ref 0–6.9)
ERYTHROCYTE [DISTWIDTH] IN BLOOD BY AUTOMATED COUNT: 53 FL (ref 35.9–50)
GLOBULIN SER CALC-MCNC: 2.7 G/DL (ref 1.9–3.5)
GLUCOSE SERPL-MCNC: 98 MG/DL (ref 65–99)
HCT VFR BLD AUTO: 40.6 % (ref 42–52)
HGB BLD-MCNC: 14.6 G/DL (ref 14–18)
IMM GRANULOCYTES # BLD AUTO: 0.05 K/UL (ref 0–0.11)
IMM GRANULOCYTES NFR BLD AUTO: 0.7 % (ref 0–0.9)
LYMPHOCYTES # BLD AUTO: 0.98 K/UL (ref 1–4.8)
LYMPHOCYTES NFR BLD: 14 % (ref 22–41)
MCH RBC QN AUTO: 35.5 PG (ref 27–33)
MCHC RBC AUTO-ENTMCNC: 36 G/DL (ref 33.7–35.3)
MCV RBC AUTO: 98.8 FL (ref 81.4–97.8)
MONOCYTES # BLD AUTO: 0.55 K/UL (ref 0–0.85)
MONOCYTES NFR BLD AUTO: 7.8 % (ref 0–13.4)
NEUTROPHILS # BLD AUTO: 4.98 K/UL (ref 1.82–7.42)
NEUTROPHILS NFR BLD: 71 % (ref 44–72)
NRBC # BLD AUTO: 0 K/UL
NRBC BLD-RTO: 0 /100 WBC
PLATELET # BLD AUTO: 399 K/UL (ref 164–446)
PMV BLD AUTO: 8 FL (ref 9–12.9)
POTASSIUM SERPL-SCNC: 4.4 MMOL/L (ref 3.6–5.5)
PROT SERPL-MCNC: 6.9 G/DL (ref 6–8.2)
RBC # BLD AUTO: 4.11 M/UL (ref 4.7–6.1)
SODIUM SERPL-SCNC: 135 MMOL/L (ref 135–145)
T4 FREE SERPL-MCNC: 1 NG/DL (ref 0.93–1.7)
TSH SERPL DL<=0.005 MIU/L-ACNC: 3.15 UIU/ML (ref 0.38–5.33)
WBC # BLD AUTO: 7 K/UL (ref 4.8–10.8)

## 2021-12-09 PROCEDURE — 84443 ASSAY THYROID STIM HORMONE: CPT

## 2021-12-09 PROCEDURE — 80053 COMPREHEN METABOLIC PANEL: CPT

## 2021-12-09 PROCEDURE — 85025 COMPLETE CBC W/AUTO DIFF WBC: CPT

## 2021-12-09 PROCEDURE — 700111 HCHG RX REV CODE 636 W/ 250 OVERRIDE (IP): Performed by: INTERNAL MEDICINE

## 2021-12-09 PROCEDURE — 96413 CHEMO IV INFUSION 1 HR: CPT

## 2021-12-09 PROCEDURE — 84439 ASSAY OF FREE THYROXINE: CPT

## 2021-12-09 PROCEDURE — 700105 HCHG RX REV CODE 258: Performed by: INTERNAL MEDICINE

## 2021-12-09 RX ORDER — HEPARIN SODIUM (PORCINE) LOCK FLUSH IV SOLN 100 UNIT/ML 100 UNIT/ML
500 SOLUTION INTRAVENOUS PRN
Status: CANCELLED | OUTPATIENT
Start: 2021-12-09

## 2021-12-09 RX ORDER — 0.9 % SODIUM CHLORIDE 0.9 %
3 VIAL (ML) INJECTION PRN
Status: CANCELLED | OUTPATIENT
Start: 2021-12-09

## 2021-12-09 RX ORDER — 0.9 % SODIUM CHLORIDE 0.9 %
VIAL (ML) INJECTION PRN
Status: CANCELLED | OUTPATIENT
Start: 2021-12-09

## 2021-12-09 RX ORDER — 0.9 % SODIUM CHLORIDE 0.9 %
10 VIAL (ML) INJECTION PRN
Status: CANCELLED | OUTPATIENT
Start: 2021-12-09

## 2021-12-09 RX ORDER — ONDANSETRON 2 MG/ML
4 INJECTION INTRAMUSCULAR; INTRAVENOUS PRN
Status: CANCELLED | OUTPATIENT
Start: 2021-12-09

## 2021-12-09 RX ORDER — ONDANSETRON 8 MG/1
8 TABLET, ORALLY DISINTEGRATING ORAL PRN
Status: CANCELLED | OUTPATIENT
Start: 2021-12-09

## 2021-12-09 RX ORDER — SODIUM CHLORIDE 9 MG/ML
INJECTION, SOLUTION INTRAVENOUS CONTINUOUS
Status: CANCELLED | OUTPATIENT
Start: 2021-12-09

## 2021-12-09 RX ORDER — PROCHLORPERAZINE MALEATE 10 MG
10 TABLET ORAL EVERY 6 HOURS PRN
Status: CANCELLED | OUTPATIENT
Start: 2021-12-09

## 2021-12-09 RX ADMIN — SODIUM CHLORIDE 200 MG: 9 INJECTION, SOLUTION INTRAVENOUS at 12:11

## 2021-12-09 ASSESSMENT — FIBROSIS 4 INDEX: FIB4 SCORE: 4.7

## 2021-12-09 NOTE — PROGRESS NOTES
Chemotherapy Verification - SECONDARY RN       Height = 161 cm  Weight = 60.2 kg  BSA = 1.64 m^2       Medication: pembrolizumab  Dose: 200 mg-set dose  Calculated Dose: 200 mg-set dose                             (In mg/m2, AUC, mg/kg)         I confirm that this process was performed independently.

## 2021-12-09 NOTE — PROGRESS NOTES
"Pharmacy Chemotherapy Calculation:    Dx:Renal Carcinoma     Protocol: Pembrolizumab     *Dosing Reference*  Pembrolizumab 200 mg IV over 30 minutes on Day 1 concurrent with   Axitinib 5-10 mg PO twice daily on Days 1-28   21 day cycle until DP or UT or until up to 24 months of therapy has been completed  NCCN Guidelines for Kidney Cancer V.1.2022.  Rebecca WOMACK et al. N Engl J Med. 2019;380(12):5745-6595.  Annette AGLVEZ, et al. Eur J Cancer. 2020;131:68-75.    Allergies:  Patient has no known allergies.     /68   Pulse 89   Temp 36.4 °C (97.5 °F) (Temporal)   Resp 18   Ht 1.61 m (5' 3.39\")   Wt 60.2 kg (132 lb 11.5 oz)   SpO2 93%   BMI 23.22 kg/m²  Body surface area is 1.64 meters squared.    Labs 12/9/21:  ANC~ 4980 Plt = 399k   Hgb = 14.6     SCr = 0.65 mg/dL CrCl ~ 99 mL/min   LFT's = 48/22/170 TBili = 0.4   TSH = pending Free T4 = pending     Drug Order   (Drug name, dose, route, IV Fluid & volume, frequency, number of doses) Cycle 6 (C5 canclled due to elelvated liver and thyroid labs)  Previous treatment: C4 on 10/28/21     Medication = Pembrolizumab (Keytruda)  Base Dose = 200 mg  Fixed dose, no calculation required   Final Dose = 200 mg  Route = IV  Fluid & Volume = NS 50 mL  Admin Duration = Over 30 minutes          Fixed dose, no calculation required. Okay to treat with final dose.     By my signature below, I confirm this process was performed independently with the BSA and all final chemotherapy dosing calculations congruent. I have reviewed the above chemotherapy order and that my calculation of the final dose and BSA (when applicable) corroborate those calculations of the  pharmacist. Discrepancies of 10% or greater in the written dose have been addressed and documented within the [facility] Progress notes.    Bharti Lemons, PharmD  "

## 2021-12-09 NOTE — PROGRESS NOTES
Chemotherapy Verification - PRIMARY RN      Height = 161 cm  Weight = 60.2 kg  BSA = 1.64 m^2       Medication: pembrolizumab (KEYTRUDA)  Dose: 200 mg (set dose)  Calculated Dose: 200 mg (set dose)                             (In mg/m2, AUC, mg/kg)     I confirm this process was performed independently with the BSA and all final chemotherapy dosing calculations congruent.  Any discrepancies of 10% or greater have been addressed with the chemotherapy pharmacist. The resolution of the discrepancy has been documented in the EPIC progress notes.

## 2021-12-09 NOTE — PROGRESS NOTES
"Pharmacy Chemotherapy calculation:    DX: Renal carcinoma    Cycle 6 (C5 cancelled for elevated liver enzymes)  Previous treatment = C4 on 10/28/21    Regimen: Axitinib + Pembrolizumab    *Dosing Reference*  Pembrolizumab 200 mg IV over 30 minutes on Day 1  21-day cycle until DP or UT or until up to 24 months of therapy has been completed  Concurrent with  Axitinib 5-10 mg PO twice daily on Days 1-28  28-day cycle until DP or UT  NCCN Guidelines for Kidney Cancer V.1.2022.  Rebecca WOMACK et al. N Engl J Med. 2019;380(12):116-1127.  Annette M, et al. Eur J Cancer. 2020;131:68-75.    Allergies: Patient has no known allergies.  /68   Pulse 89   Temp 36.4 °C (97.5 °F) (Temporal)   Resp 18   Ht 1.61 m (5' 3.39\")   Wt 60.2 kg (132 lb 11.5 oz)   SpO2 93%   BMI 23.22 kg/m²   Body surface area is 1.64 meters squared.    Labs 12/9/21  ANC~ 4980 Plt = 399k   Hgb = 14.6     SCr = 0.65 mg/dL CrCl ~ 98.6 mL/min (minimum SCr of 0.7)   LFT's = 48/22/170 TBili = 0.4   TSH = pending Free T4 = pending    Pembrolizumab 200 mg fixed dose = 200 mg   <10% difference, okay to treat with final dose = 200 mg IV    Arvin Elkins, PharmD    "

## 2021-12-09 NOTE — PROGRESS NOTES
Zachery is here for Day 1, Cycle 6 pembrolizumab (KEYTRUDA) for renal carcinoma. PIV established; labs drawn as ordered. Labs reviewed and within parameters to proceed with treatment today. pembrolizumab (KEYTRUDA) given per MAR; in-line filter in place. PIV flushed with normal saline and removed; gauze/coban applied to site. Next appointment scheduled. Discharged to self care; no apparent distress noted.

## 2021-12-18 RX ORDER — SODIUM CHLORIDE 9 MG/ML
INJECTION, SOLUTION INTRAVENOUS CONTINUOUS
Status: CANCELLED | OUTPATIENT
Start: 2022-01-20

## 2021-12-18 RX ORDER — 0.9 % SODIUM CHLORIDE 0.9 %
10 VIAL (ML) INJECTION PRN
Status: CANCELLED | OUTPATIENT
Start: 2022-01-20

## 2021-12-18 RX ORDER — 0.9 % SODIUM CHLORIDE 0.9 %
10 VIAL (ML) INJECTION PRN
Status: CANCELLED | OUTPATIENT
Start: 2021-12-30

## 2021-12-18 RX ORDER — 0.9 % SODIUM CHLORIDE 0.9 %
VIAL (ML) INJECTION PRN
Status: CANCELLED | OUTPATIENT
Start: 2021-12-30

## 2021-12-18 RX ORDER — ONDANSETRON 8 MG/1
8 TABLET, ORALLY DISINTEGRATING ORAL PRN
Status: CANCELLED | OUTPATIENT
Start: 2022-01-20

## 2021-12-18 RX ORDER — PROCHLORPERAZINE MALEATE 10 MG
10 TABLET ORAL EVERY 6 HOURS PRN
Status: CANCELLED | OUTPATIENT
Start: 2022-01-20

## 2021-12-18 RX ORDER — ONDANSETRON 2 MG/ML
4 INJECTION INTRAMUSCULAR; INTRAVENOUS PRN
Status: CANCELLED | OUTPATIENT
Start: 2022-01-20

## 2021-12-18 RX ORDER — 0.9 % SODIUM CHLORIDE 0.9 %
3 VIAL (ML) INJECTION PRN
Status: CANCELLED | OUTPATIENT
Start: 2022-01-20

## 2021-12-18 RX ORDER — 0.9 % SODIUM CHLORIDE 0.9 %
VIAL (ML) INJECTION PRN
Status: CANCELLED | OUTPATIENT
Start: 2022-01-20

## 2021-12-18 RX ORDER — 0.9 % SODIUM CHLORIDE 0.9 %
3 VIAL (ML) INJECTION PRN
Status: CANCELLED | OUTPATIENT
Start: 2021-12-30

## 2021-12-18 RX ORDER — HEPARIN SODIUM (PORCINE) LOCK FLUSH IV SOLN 100 UNIT/ML 100 UNIT/ML
500 SOLUTION INTRAVENOUS PRN
Status: CANCELLED | OUTPATIENT
Start: 2022-01-20

## 2021-12-18 RX ORDER — HEPARIN SODIUM (PORCINE) LOCK FLUSH IV SOLN 100 UNIT/ML 100 UNIT/ML
500 SOLUTION INTRAVENOUS PRN
Status: CANCELLED | OUTPATIENT
Start: 2021-12-30

## 2021-12-30 ENCOUNTER — TELEPHONE (OUTPATIENT)
Dept: ONCOLOGY | Facility: MEDICAL CENTER | Age: 56
End: 2021-12-30

## 2021-12-30 ENCOUNTER — APPOINTMENT (OUTPATIENT)
Dept: ONCOLOGY | Facility: MEDICAL CENTER | Age: 56
End: 2021-12-30
Attending: INTERNAL MEDICINE
Payer: MEDICAID

## 2021-12-30 NOTE — TELEPHONE ENCOUNTER
Call placed to Dr Aguilar's office, message left for Dr Aguilar that pt cancelled today's appt d/t weather and road conditions from [place].  Please have Dr Aguilar contact us if he would like pt r/s sooner than next appt 1-20.

## 2022-01-20 ENCOUNTER — OUTPATIENT INFUSION SERVICES (OUTPATIENT)
Dept: ONCOLOGY | Facility: MEDICAL CENTER | Age: 57
End: 2022-01-20
Attending: INTERNAL MEDICINE
Payer: MEDICAID

## 2022-01-20 VITALS
OXYGEN SATURATION: 96 % | BODY MASS INDEX: 22.62 KG/M2 | HEART RATE: 84 BPM | DIASTOLIC BLOOD PRESSURE: 70 MMHG | WEIGHT: 127.65 LBS | TEMPERATURE: 97.1 F | HEIGHT: 63 IN | SYSTOLIC BLOOD PRESSURE: 103 MMHG | RESPIRATION RATE: 18 BRPM

## 2022-01-20 DIAGNOSIS — C64.1 CARCINOMA OF RIGHT KIDNEY (HCC): ICD-10-CM

## 2022-01-20 LAB
BASOPHILS # BLD AUTO: 1.4 % (ref 0–1.8)
BASOPHILS # BLD: 0.06 K/UL (ref 0–0.12)
EOSINOPHIL # BLD AUTO: 0.21 K/UL (ref 0–0.51)
EOSINOPHIL NFR BLD: 4.8 % (ref 0–6.9)
ERYTHROCYTE [DISTWIDTH] IN BLOOD BY AUTOMATED COUNT: 49.3 FL (ref 35.9–50)
HCT VFR BLD AUTO: 40.3 % (ref 42–52)
HGB BLD-MCNC: 14.6 G/DL (ref 14–18)
IMM GRANULOCYTES # BLD AUTO: 0.02 K/UL (ref 0–0.11)
IMM GRANULOCYTES NFR BLD AUTO: 0.5 % (ref 0–0.9)
LYMPHOCYTES # BLD AUTO: 0.83 K/UL (ref 1–4.8)
LYMPHOCYTES NFR BLD: 18.9 % (ref 22–41)
MCH RBC QN AUTO: 36 PG (ref 27–33)
MCHC RBC AUTO-ENTMCNC: 36.2 G/DL (ref 33.7–35.3)
MCV RBC AUTO: 99.5 FL (ref 81.4–97.8)
MONOCYTES # BLD AUTO: 0.52 K/UL (ref 0–0.85)
MONOCYTES NFR BLD AUTO: 11.8 % (ref 0–13.4)
NEUTROPHILS # BLD AUTO: 2.75 K/UL (ref 1.82–7.42)
NEUTROPHILS NFR BLD: 62.6 % (ref 44–72)
NRBC # BLD AUTO: 0 K/UL
NRBC BLD-RTO: 0 /100 WBC
PLATELET # BLD AUTO: 313 K/UL (ref 164–446)
PMV BLD AUTO: 8.3 FL (ref 9–12.9)
RBC # BLD AUTO: 4.05 M/UL (ref 4.7–6.1)
T4 FREE SERPL-MCNC: 1.03 NG/DL (ref 0.93–1.7)
TSH SERPL DL<=0.005 MIU/L-ACNC: 2.51 UIU/ML (ref 0.38–5.33)
WBC # BLD AUTO: 4.4 K/UL (ref 4.8–10.8)

## 2022-01-20 PROCEDURE — 84443 ASSAY THYROID STIM HORMONE: CPT

## 2022-01-20 PROCEDURE — 85025 COMPLETE CBC W/AUTO DIFF WBC: CPT

## 2022-01-20 PROCEDURE — 84439 ASSAY OF FREE THYROXINE: CPT

## 2022-01-20 PROCEDURE — 36415 COLL VENOUS BLD VENIPUNCTURE: CPT

## 2022-01-20 ASSESSMENT — FIBROSIS 4 INDEX: FIB4 SCORE: 1.46

## 2022-01-20 NOTE — PROGRESS NOTES
"Pt arrives to [facility] for cycle 7 of Keytruda.  Pts last treatment was on 12/09/2021.  Pt reports he was seen at [facility] office this morning and that he had labs drawn at [facility] in [place] a few days ago.  Discussed plan of care with pt and that I would call [facility] office to request lab results be faxed to [facility].  Pt agrees with plan.  Pt denies s/sx of infection or other complaints today.  Received faxed CMP drawn at [facility] on 01/18/2022 from [facility] office.  Called [facility] to confirm that is all the labs they have in patients record and he did not have CBC drawn today at [facility]. Informed pt that he will need CBC and TSH/T4 drawn via his PIV.  Pt agrees to wait for CBC results.  PIV established to L-[facility] and labs drawn. After 30 minutes, this nurse called [facility] hematology to check CBC status.  CBC results were not resulted yet.  Informed pt that CBC is still pending.  Pt stated, \"I am only waiting 10 more minutes and then I'm leaving.  After a few minutes pt states, \"I am ready to go.\"  Discussed the importance of getting his treatment today since he did not come to his last appt due to the weather.  Pt requested to leave and not get treatment today.  PIV site removed.  Pt dc home to self care.  Detailed message sent to PRABHJOT Cruz.  Scheduling dept notified to call pt to re-schedule.   "

## 2022-01-20 NOTE — PROGRESS NOTES
"Pharmacy Chemotherapy Calculation:  No Chemotherapy given - Patient left without treatment    Dx:Renal Carcinoma     Protocol: Pembrolizumab     *Dosing Reference*  Pembrolizumab 200 mg IV over 30 minutes on Day 1 concurrent with   Axitinib 5-10 mg PO twice daily on Days 1-28   21 day cycle until DP or UT or until up to 24 months of therapy has been completed  NCCN Guidelines for Kidney Cancer V.1.2022.  Rebecca WOMACK, et al. N Engl J Med. 2019;380(12):3501-9702.  Annette GALVEZ et al. Eur J Cancer. 2020;131:68-75.    Allergies:  Patient has no known allergies.     /70   Pulse 84   Temp 36.2 °C (97.1 °F) (Temporal)   Resp 18   Ht 1.61 m (5' 3.39\")   Wt 57.9 kg (127 lb 10.3 oz)   SpO2 96%   BMI 22.34 kg/m²  Body surface area is 1.61 meters squared.     Labs from 1/20/22 & 1/18/22 [facility] reviewed- results within treatment parameters      Drug Order   (Drug name, dose, route, IV Fluid & volume, frequency, number of doses) Cycle 7 (delayed 3 weeks due to weather)  Previous treatment: C6 on 12/9/21     Medication = Pembrolizumab (Keytruda)  Base Dose = 200 mg  Fixed dose, no calculation required   Final Dose = 0 mg  Route = IV  Fluid & Volume = NS 50 mL  Admin Duration = Over 30 minutes          Fixed dose, no calculation required. Okay to treat with final dose.     By my signature below, I confirm this process was performed independently with the BSA and all final chemotherapy dosing calculations congruent. I have reviewed the above chemotherapy order and that my calculation of the final dose and BSA (when applicable) corroborate those calculations of the  pharmacist. Discrepancies of 10% or greater in the written dose have been addressed and documented within the [facility] Progress notes.    Cole Stoll, PharmD  "

## 2022-01-27 ENCOUNTER — OUTPATIENT INFUSION SERVICES (OUTPATIENT)
Dept: ONCOLOGY | Facility: MEDICAL CENTER | Age: 57
End: 2022-01-27
Attending: INTERNAL MEDICINE
Payer: MEDICAID

## 2022-01-27 VITALS
HEIGHT: 63 IN | TEMPERATURE: 97.8 F | BODY MASS INDEX: 22.93 KG/M2 | WEIGHT: 129.41 LBS | RESPIRATION RATE: 17 BRPM | HEART RATE: 74 BPM | SYSTOLIC BLOOD PRESSURE: 117 MMHG | OXYGEN SATURATION: 98 % | DIASTOLIC BLOOD PRESSURE: 73 MMHG

## 2022-01-27 DIAGNOSIS — C64.1 CARCINOMA OF RIGHT KIDNEY (HCC): ICD-10-CM

## 2022-01-27 PROCEDURE — 700105 HCHG RX REV CODE 258: Performed by: INTERNAL MEDICINE

## 2022-01-27 PROCEDURE — 96413 CHEMO IV INFUSION 1 HR: CPT

## 2022-01-27 PROCEDURE — 700111 HCHG RX REV CODE 636 W/ 250 OVERRIDE (IP): Performed by: INTERNAL MEDICINE

## 2022-01-27 RX ADMIN — SODIUM CHLORIDE 200 MG: 9 INJECTION, SOLUTION INTRAVENOUS at 14:14

## 2022-01-27 ASSESSMENT — FIBROSIS 4 INDEX: FIB4 SCORE: 1.86

## 2022-01-27 NOTE — PROGRESS NOTES
Pt ambulatory to Infusion for C7 D1 of keytruda for renal cell carcinoma.  Pt w/ no s/s of infection, pt has no complaints at this time.  PIV established in RFA, w/ brisk blood return noted, flushed per protocol.  Pt had labs done last week, ok to use order in tx plan, lab results w/n parameters for tx today.  Keytruda infused w/ no s/s of AE.  PIV w/ brisk blood return post-chemo, flushed, and removed, gauze bandage applied.  Pt left on foot in care of self in NAD.  Confirmed pt's next appt.

## 2022-01-27 NOTE — PROGRESS NOTES
"Pharmacy Chemotherapy calculation:    DX: Renal carcinoma    Cycle 7  Previous treatment = C6 on 12/9/21    Regimen: Axitinib + Pembrolizumab    *Dosing Reference*  Pembrolizumab 200 mg IV over 30 minutes on Day 1  21-day cycle until DP or UT or until up to 24 months of therapy has been completed  Concurrent with  Axitinib 5-10 mg PO twice daily on Days 1-28  28-day cycle until DP or UT  NCCN Guidelines for Kidney Cancer V.1.2022.  Rebecca WOMACK, et al. N Engl J Med. 2019;380(12):116-1127.  Annette GALVEZ et al. Eur J Cancer. 2020;131:68-75.    Allergies: Patient has no known allergies.  /73   Pulse 74   Temp 36.6 °C (97.8 °F) (Temporal)   Resp 17   Ht 1.61 m (5' 3.39\")   Wt 58.7 kg (129 lb 6.6 oz)   SpO2 98%   BMI 22.65 kg/m²   Body surface area is 1.62 meters squared.    Labs 1/20/22 ([facility]) - Okay to use per Dr. Aguilar  ANC~ 2750 Plt = 313k   Hgb = 14.6     TSH = 2.51 Free T4 = 1.03    Labs 1/18/22 ([facility]) - Okay to use per Dr. Aguilar  SCr = 1 mg/dL CrCl ~ 67.2 mL/min   LFT's = 31/24/234 TBili = 0.7     Pembrolizumab 200 mg fixed dose = 200 mg   <10% difference, okay to treat with final dose = 200 mg IV    Arvin Elkins, PharmD    "

## 2022-01-27 NOTE — PROGRESS NOTES
Chemotherapy Verification - SECONDARY RN     C7 D1    Height = 161 cm  Weight = 58.7 kg  BSA = 1.62 m^2       Medication: Pembrolizumab (Keytruda)  Dose: set dose 200 mg  Calculated Dose: set dose 200 mg                             (In mg/m2, AUC, mg/kg)     I confirm that this process was performed independently.

## 2022-01-27 NOTE — PROGRESS NOTES
"Pharmacy Chemotherapy Calculation:    Dx:Renal Carcinoma     Protocol: Pembrolizumab     *Dosing Reference*  Pembrolizumab 200 mg IV over 30 minutes on Day 1 concurrent with   Axitinib 5-10 mg PO twice daily on Days 1-28   21 day cycle until DP or UT or until up to 24 months of therapy has been completed  NCCN Guidelines for Kidney Cancer V.1.2022.  Rebecca WOMACK et al. N Engl J Med. 2019;380(12):5588-7980.  Annette GALVEZ, et al. Eur J Cancer. 2020;131:68-75.    Allergies:  Patient has no known allergies.     /73   Pulse 74   Temp 36.6 °C (97.8 °F) (Temporal)   Resp 17   Ht 1.61 m (5' 3.39\")   Wt 58.7 kg (129 lb 6.6 oz)   SpO2 98%   BMI 22.65 kg/m²  Body surface area is 1.62 meters squared.    Okay to use labs from last week per Dr. Aguilar    Labs 1/20/22 ([facility]):  ANC~ 2750 Plt = 313k   Hgb = 14.6     TSH = 2.51 Free T4 = 1.03    Labs 1/18/22 ([facility]):  SCr = 1mg/dL CrCl ~ 68 mL/min   LFT's = 31/24/234 TBili = 0.7      Drug Order   (Drug name, dose, route, IV Fluid & volume, frequency, number of doses) Cycle 7   Previous treatment: C6 on 12/9/21     Medication = Pembrolizumab (Keytruda)  Base Dose = 200 mg  Fixed dose, no calculation required   Final Dose = 200 mg  Route = IV  Fluid & Volume = NS 50 mL  Admin Duration = Over 30 minutes          Fixed dose, no calculation required. Okay to treat with final dose.     By my signature below, I confirm this process was performed independently with the BSA and all final chemotherapy dosing calculations congruent. I have reviewed the above chemotherapy order and that my calculation of the final dose and BSA (when applicable) corroborate those calculations of the  pharmacist. Discrepancies of 10% or greater in the written dose have been addressed and documented within the [facility] Progress notes.    Bharti Lemons, PharmD  "

## 2022-01-27 NOTE — PROGRESS NOTES
Chemotherapy Verification - PRIMARY RN  C7 D1      Height = 1.61 m  Weight = 58.7 kg  BSA = 1.62 m2       Medication: pambrolizmab  Dose: set dose  Calculated Dose: 200 mg set dose no calc required                             (In mg/m2, AUC, mg/kg)         I confirm this process was performed independently with the BSA and all final chemotherapy dosing calculations congruent.  Any discrepancies of 10% or greater have been addressed with the chemotherapy pharmacist. The resolution of the discrepancy has been documented in the [facility] progress notes.

## 2022-02-02 ENCOUNTER — PATIENT OUTREACH (OUTPATIENT)
Dept: OTHER | Facility: MEDICAL CENTER | Age: 57
End: 2022-02-02

## 2022-02-02 NOTE — PROGRESS NOTES
Follow up call placed to patient for cancer nurse navigation.  Pt reports infusion treatment are going well.  He is just a little tired after.  Pt saying he has appointment on the 10th to see his oncologist.  Pt providing information that he is requesting to get his infusions transferred to [facility] so he won't have to come to [place] for the treatment.  Pt saying they do have infusion center that does immunotherapy.  He will be discussing this more when he sees oncologist.  Pt denies other issues or concerns at this time.  Contact information provided to patient for added resource if needed.

## 2022-02-10 ENCOUNTER — APPOINTMENT (OUTPATIENT)
Dept: RADIOLOGY | Facility: MEDICAL CENTER | Age: 57
End: 2022-02-10
Attending: EMERGENCY MEDICINE
Payer: MEDICAID

## 2022-02-10 ENCOUNTER — HOSPITAL ENCOUNTER (EMERGENCY)
Facility: MEDICAL CENTER | Age: 57
End: 2022-02-10
Attending: EMERGENCY MEDICINE
Payer: MEDICAID

## 2022-02-10 ENCOUNTER — PATIENT OUTREACH (OUTPATIENT)
Dept: OTHER | Facility: MEDICAL CENTER | Age: 57
End: 2022-02-10

## 2022-02-10 VITALS
HEART RATE: 86 BPM | HEIGHT: 63 IN | DIASTOLIC BLOOD PRESSURE: 93 MMHG | RESPIRATION RATE: 20 BRPM | OXYGEN SATURATION: 92 % | SYSTOLIC BLOOD PRESSURE: 137 MMHG | BODY MASS INDEX: 24.63 KG/M2 | WEIGHT: 139 LBS | TEMPERATURE: 96.5 F

## 2022-02-10 DIAGNOSIS — E86.0 DEHYDRATION: ICD-10-CM

## 2022-02-10 DIAGNOSIS — C64.9 MALIGNANT NEOPLASM OF KIDNEY, UNSPECIFIED LATERALITY (HCC): ICD-10-CM

## 2022-02-10 DIAGNOSIS — R56.9 SEIZURE (HCC): ICD-10-CM

## 2022-02-10 LAB
ALBUMIN SERPL BCP-MCNC: 4.2 G/DL (ref 3.2–4.9)
ALBUMIN/GLOB SERPL: 1.4 G/DL
ALP SERPL-CCNC: 390 U/L (ref 30–99)
ALT SERPL-CCNC: 45 U/L (ref 2–50)
ANION GAP SERPL CALC-SCNC: 16 MMOL/L (ref 7–16)
ANION GAP SERPL CALC-SCNC: 24 MMOL/L (ref 7–16)
AST SERPL-CCNC: 123 U/L (ref 12–45)
BASOPHILS # BLD AUTO: 1.1 % (ref 0–1.8)
BASOPHILS # BLD: 0.06 K/UL (ref 0–0.12)
BILIRUB SERPL-MCNC: 0.6 MG/DL (ref 0.1–1.5)
BUN SERPL-MCNC: 7 MG/DL (ref 8–22)
BUN SERPL-MCNC: 7 MG/DL (ref 8–22)
CALCIUM SERPL-MCNC: 8.5 MG/DL (ref 8.5–10.5)
CALCIUM SERPL-MCNC: 8.8 MG/DL (ref 8.5–10.5)
CHLORIDE SERPL-SCNC: 96 MMOL/L (ref 96–112)
CHLORIDE SERPL-SCNC: 97 MMOL/L (ref 96–112)
CO2 SERPL-SCNC: 16 MMOL/L (ref 20–33)
CO2 SERPL-SCNC: 21 MMOL/L (ref 20–33)
CREAT SERPL-MCNC: 0.67 MG/DL (ref 0.5–1.4)
CREAT SERPL-MCNC: 0.75 MG/DL (ref 0.5–1.4)
EOSINOPHIL # BLD AUTO: 0.08 K/UL (ref 0–0.51)
EOSINOPHIL NFR BLD: 1.4 % (ref 0–6.9)
ERYTHROCYTE [DISTWIDTH] IN BLOOD BY AUTOMATED COUNT: 55.4 FL (ref 35.9–50)
GLOBULIN SER CALC-MCNC: 3 G/DL (ref 1.9–3.5)
GLUCOSE SERPL-MCNC: 111 MG/DL (ref 65–99)
GLUCOSE SERPL-MCNC: 84 MG/DL (ref 65–99)
HCT VFR BLD AUTO: 45.5 % (ref 42–52)
HGB BLD-MCNC: 16.1 G/DL (ref 14–18)
IMM GRANULOCYTES # BLD AUTO: 0.02 K/UL (ref 0–0.11)
IMM GRANULOCYTES NFR BLD AUTO: 0.4 % (ref 0–0.9)
LYMPHOCYTES # BLD AUTO: 0.75 K/UL (ref 1–4.8)
LYMPHOCYTES NFR BLD: 13.5 % (ref 22–41)
MCH RBC QN AUTO: 35.3 PG (ref 27–33)
MCHC RBC AUTO-ENTMCNC: 35.4 G/DL (ref 33.7–35.3)
MCV RBC AUTO: 99.8 FL (ref 81.4–97.8)
MONOCYTES # BLD AUTO: 0.48 K/UL (ref 0–0.85)
MONOCYTES NFR BLD AUTO: 8.7 % (ref 0–13.4)
NEUTROPHILS # BLD AUTO: 4.15 K/UL (ref 1.82–7.42)
NEUTROPHILS NFR BLD: 74.9 % (ref 44–72)
NRBC # BLD AUTO: 0 K/UL
NRBC BLD-RTO: 0 /100 WBC
PLATELET # BLD AUTO: 204 K/UL (ref 164–446)
PMV BLD AUTO: 7.6 FL (ref 9–12.9)
POTASSIUM SERPL-SCNC: 4 MMOL/L (ref 3.6–5.5)
POTASSIUM SERPL-SCNC: 4.6 MMOL/L (ref 3.6–5.5)
PROT SERPL-MCNC: 7.2 G/DL (ref 6–8.2)
RBC # BLD AUTO: 4.56 M/UL (ref 4.7–6.1)
SODIUM SERPL-SCNC: 134 MMOL/L (ref 135–145)
SODIUM SERPL-SCNC: 136 MMOL/L (ref 135–145)
WBC # BLD AUTO: 5.5 K/UL (ref 4.8–10.8)

## 2022-02-10 PROCEDURE — 700117 HCHG RX CONTRAST REV CODE 255: Performed by: EMERGENCY MEDICINE

## 2022-02-10 PROCEDURE — A9576 INJ PROHANCE MULTIPACK: HCPCS | Performed by: EMERGENCY MEDICINE

## 2022-02-10 PROCEDURE — 70450 CT HEAD/BRAIN W/O DYE: CPT

## 2022-02-10 PROCEDURE — 80053 COMPREHEN METABOLIC PANEL: CPT

## 2022-02-10 PROCEDURE — 85025 COMPLETE CBC W/AUTO DIFF WBC: CPT

## 2022-02-10 PROCEDURE — 96374 THER/PROPH/DIAG INJ IV PUSH: CPT

## 2022-02-10 PROCEDURE — 700102 HCHG RX REV CODE 250 W/ 637 OVERRIDE(OP): Performed by: EMERGENCY MEDICINE

## 2022-02-10 PROCEDURE — 99285 EMERGENCY DEPT VISIT HI MDM: CPT

## 2022-02-10 PROCEDURE — 36415 COLL VENOUS BLD VENIPUNCTURE: CPT

## 2022-02-10 PROCEDURE — 700105 HCHG RX REV CODE 258: Performed by: EMERGENCY MEDICINE

## 2022-02-10 PROCEDURE — 70553 MRI BRAIN STEM W/O & W/DYE: CPT

## 2022-02-10 PROCEDURE — 700111 HCHG RX REV CODE 636 W/ 250 OVERRIDE (IP): Performed by: EMERGENCY MEDICINE

## 2022-02-10 PROCEDURE — 80048 BASIC METABOLIC PNL TOTAL CA: CPT

## 2022-02-10 PROCEDURE — A9270 NON-COVERED ITEM OR SERVICE: HCPCS | Performed by: EMERGENCY MEDICINE

## 2022-02-10 RX ORDER — LEVETIRACETAM 500 MG/1
500 TABLET ORAL ONCE
Status: COMPLETED | OUTPATIENT
Start: 2022-02-10 | End: 2022-02-10

## 2022-02-10 RX ORDER — ONDANSETRON 2 MG/ML
4 INJECTION INTRAMUSCULAR; INTRAVENOUS ONCE
Status: COMPLETED | OUTPATIENT
Start: 2022-02-10 | End: 2022-02-10

## 2022-02-10 RX ORDER — SODIUM CHLORIDE 9 MG/ML
1000 INJECTION, SOLUTION INTRAVENOUS ONCE
Status: COMPLETED | OUTPATIENT
Start: 2022-02-10 | End: 2022-02-10

## 2022-02-10 RX ORDER — LORAZEPAM 2 MG/ML
INJECTION INTRAMUSCULAR
Status: DISCONTINUED
Start: 2022-02-10 | End: 2022-02-10 | Stop reason: HOSPADM

## 2022-02-10 RX ORDER — LEVETIRACETAM 500 MG/1
500 TABLET ORAL 2 TIMES DAILY
Qty: 60 TABLET | Refills: 0 | Status: SHIPPED | OUTPATIENT
Start: 2022-02-10

## 2022-02-10 RX ORDER — LORAZEPAM 2 MG/ML
2 INJECTION INTRAMUSCULAR ONCE
Status: DISCONTINUED | OUTPATIENT
Start: 2022-02-10 | End: 2022-02-10 | Stop reason: HOSPADM

## 2022-02-10 RX ORDER — ACETAMINOPHEN 325 MG/1
650 TABLET ORAL ONCE
Status: COMPLETED | OUTPATIENT
Start: 2022-02-10 | End: 2022-02-10

## 2022-02-10 RX ADMIN — ONDANSETRON 4 MG: 2 INJECTION INTRAMUSCULAR; INTRAVENOUS at 13:43

## 2022-02-10 RX ADMIN — GADOTERIDOL 13 ML: 279.3 INJECTION, SOLUTION INTRAVENOUS at 15:23

## 2022-02-10 RX ADMIN — SODIUM CHLORIDE 1000 ML: 9 INJECTION, SOLUTION INTRAVENOUS at 13:28

## 2022-02-10 RX ADMIN — LEVETIRACETAM 500 MG: 500 TABLET, FILM COATED ORAL at 16:22

## 2022-02-10 RX ADMIN — ACETAMINOPHEN 650 MG: 325 TABLET, FILM COATED ORAL at 13:44

## 2022-02-10 ASSESSMENT — LIFESTYLE VARIABLES
ON A TYPICAL DAY WHEN YOU DRINK ALCOHOL HOW MANY DRINKS DO YOU HAVE: 0
CONSUMPTION TOTAL: NEGATIVE
TOTAL SCORE: 0
HOW MANY TIMES IN THE PAST YEAR HAVE YOU HAD 5 OR MORE DRINKS IN A DAY: 0
TOTAL SCORE: 0
EVER HAD A DRINK FIRST THING IN THE MORNING TO STEADY YOUR NERVES TO GET RID OF A HANGOVER: NO
HAVE PEOPLE ANNOYED YOU BY CRITICIZING YOUR DRINKING: NO
TOTAL SCORE: 0
AVERAGE NUMBER OF DAYS PER WEEK YOU HAVE A DRINK CONTAINING ALCOHOL: 0
HAVE YOU EVER FELT YOU SHOULD CUT DOWN ON YOUR DRINKING: NO
EVER FELT BAD OR GUILTY ABOUT YOUR DRINKING: NO
DO YOU DRINK ALCOHOL: NO

## 2022-02-10 ASSESSMENT — FIBROSIS 4 INDEX: FIB4 SCORE: 1.86

## 2022-02-10 NOTE — ED NOTES
Pt awake and alert, vomited a small amount. Pt states sometimes it happens when he gets coughing. Gown changed, ice water provided. ERP at bedside.

## 2022-02-10 NOTE — ED TRIAGE NOTES
"Chief Complaint   Patient presents with   • Seizure     At oncology appointment today, hx of renal cancer with mets to lungs, seizure witnessed by oncology MD reported as grand mal X 1, no hx of seizures, pt states no hx of seizure and no recent abnormalities besides feeling 'a little sick' the last two days, currently receiving immunotherapy. Pt has lateral tongue biting but no other trauma from seizure, assisted to ground, denies incontinence. Pt does not recall event      Pt BIB EMS for above complaint, VSS on RA, GCS 15, NAD. Hx renal cancer with mets to lung, COPD with intermittent home oxygen use.     Denies all s/sx of covid, denies recent travel, denies fevers.    /87   Pulse 100   Temp 36.1 °C (97 °F) (Temporal)   Resp 20   Ht 1.6 m (5' 3\")   Wt 63 kg (139 lb)   SpO2 91%   BMI 24.62 kg/m²      "

## 2022-02-10 NOTE — ED PROVIDER NOTES
"ED Provider Note    Scribed for Elidia Baez M.D. by Vamsi Nye. 2/10/2022, 10:49 AM.    Primary care provider: Michael Sultana M.D.  Means of arrival: EMS  History obtained from: Patient  History limited by: None    CHIEF COMPLAINT  Chief Complaint   Patient presents with    Seizure     At oncology appointment today, hx of renal cancer with mets to lungs, seizure witnessed by oncology MD reported as grand mal X 1, no hx of seizures, pt states no hx of seizure and no recent abnormalities besides feeling 'a little sick' the last two days, currently receiving immunotherapy. Pt has lateral tongue biting but no other trauma from seizure, assisted to ground, denies incontinence. Pt does not recall event        HPI  Zachery Heath is a 56 y.o. male who presents to the Emergency Department via EMS for evaluation of a seizure onset prior to arrival. He states that he was at an appointment with Dr. Aguilar his oncologist when he began having the seizure. He has renal cancer with mets to the lungs. His mother notes that the seizure lasted around 20-25 minutes and he was shaking all over and was red. Per EMS the patient had lateral tongue biting during the episode but there was no incontinence or other trauma.  Patient was postictal afterwards.  He denies any history of seizures. He states that he does not remember feeling any symptoms prior to the seizure other than generalized fatigue and \"a little sick\". But he notes that he recently took a long trip and thought this was the reason for his fatigue. He denies any fevers or headaches. He is currently receiving immunotherapy for his cancer, and his next treatment is next week. He denies any recent changes in medication. He has a history of COPD and occasionally uses supplemental O2.       REVIEW OF SYSTEMS  Pertinent positives include seizure, shaking, generalized redness. Pertinent negatives include no fevers or headaches, numbness, one-sided weakness. All other " "systems reviewed and negative.     PAST MEDICAL HISTORY   has a past medical history of Bowel habit changes (2021), Breath shortness (07/2021), Cancer (HCC) (7/2017), COPD (chronic obstructive pulmonary disease) (HCC), Emphysema of lung (HCC) (2010), Pain, Pain (07/2021), Pneumonia (07/2021), Psychiatric problem, Renal disorder (2017), and Urinary bladder disorder.    SURGICAL HISTORY   has a past surgical history that includes nephrectomy radical (Right, 8/23/2017) and other (Left, 2021).    SOCIAL HISTORY  Social History     Tobacco Use    Smoking status: Current Every Day Smoker     Packs/day: 0.25     Years: 30.00     Pack years: 7.50     Types: Cigarettes    Smokeless tobacco: Never Used    Tobacco comment: 2 cig/day   Vaping Use    Vaping Use: Never used   Substance Use Topics    Alcohol use: Yes     Comment: 2-3 per week    Drug use: Yes     Types: Inhaled     Comment: marijuana daily-last use 1 week ago      Social History     Substance and Sexual Activity   Drug Use Yes    Types: Inhaled    Comment: marijuana daily-last use 1 week ago       FAMILY HISTORY  None reported.     CURRENT MEDICATIONS  Home Medications       Reviewed by Erickson Uriarte R.N. (Registered Nurse) on 02/10/22 at 1054  Med List Status: <None>     Medication Last Dose Status   aspirin (ASA) 81 MG Chew Tab chewable tablet  Active   Axitinib (INLYTA) 5 MG Tab  Active   KEYTRUDA 100 MG/4ML Solution injection  Active   levalbuterol (XOPENEX HFA) 45 MCG/ACT inhaler  Active   loratadine (CLARITIN) 10 MG Tab  Active   nicotine polacrilex (NICORETTE) 4 MG gum  Active   sertraline (ZOLOFT) 100 MG Tab  Active                    ALLERGIES  No Known Allergies    PHYSICAL EXAM  VITAL SIGNS: /87   Pulse 100   Temp 36.1 °C (97 °F) (Temporal)   Resp 20   Ht 1.6 m (5' 3\")   Wt 63 kg (139 lb)   SpO2 91%   BMI 24.62 kg/m²     Constitutional: Patient sitting on gurney in trauma bay. Well developed, No acute distress, Non-toxic appearance. "   HENT: Normocephalic, Atraumatic, Bilateral external ears normal, Abrasions to the lateral aspect of the tongue. No active bleeding or saturable lacerations.  Nose normal.   Eyes: PERRL, EOMI, Conjunctiva normal.    Neck: Normal range of motion, No tenderness, Supple.     Cardiovascular: Normal heart rate, Normal rhythm.    Thorax & Lungs: Normal breath sounds, No respiratory distress. No chest tenderness.  Abdomen: Benign abdominal exam, no tenderness, no distention, no guarding, no rebound.    Skin: Warm, Dry, No erythema, No rash.   Back: No tenderness, No CVA tenderness.   Extremities: Intact distal pulses, No edema, No tenderness   Neurologic: Alert & oriented x 3, Normal motor function, Normal sensory function, No focal deficits noted. No pronator drift. No facial droop.  Psychiatric: Appropriate                                                     DIAGNOSTIC STUDIES / PROCEDURES\    LABS  Results for orders placed or performed during the hospital encounter of 02/10/22   CBC WITH DIFFERENTIAL   Result Value Ref Range    WBC 5.5 4.8 - 10.8 K/uL    RBC 4.56 (L) 4.70 - 6.10 M/uL    Hemoglobin 16.1 14.0 - 18.0 g/dL    Hematocrit 45.5 42.0 - 52.0 %    MCV 99.8 (H) 81.4 - 97.8 fL    MCH 35.3 (H) 27.0 - 33.0 pg    MCHC 35.4 (H) 33.7 - 35.3 g/dL    RDW 55.4 (H) 35.9 - 50.0 fL    Platelet Count 204 164 - 446 K/uL    MPV 7.6 (L) 9.0 - 12.9 fL    Neutrophils-Polys 74.90 (H) 44.00 - 72.00 %    Lymphocytes 13.50 (L) 22.00 - 41.00 %    Monocytes 8.70 0.00 - 13.40 %    Eosinophils 1.40 0.00 - 6.90 %    Basophils 1.10 0.00 - 1.80 %    Immature Granulocytes 0.40 0.00 - 0.90 %    Nucleated RBC 0.00 /100 WBC    Neutrophils (Absolute) 4.15 1.82 - 7.42 K/uL    Lymphs (Absolute) 0.75 (L) 1.00 - 4.80 K/uL    Monos (Absolute) 0.48 0.00 - 0.85 K/uL    Eos (Absolute) 0.08 0.00 - 0.51 K/uL    Baso (Absolute) 0.06 0.00 - 0.12 K/uL    Immature Granulocytes (abs) 0.02 0.00 - 0.11 K/uL    NRBC (Absolute) 0.00 K/uL   COMP METABOLIC PANEL    Result Value Ref Range    Sodium 136 135 - 145 mmol/L    Potassium 4.0 3.6 - 5.5 mmol/L    Chloride 96 96 - 112 mmol/L    Co2 16 (L) 20 - 33 mmol/L    Anion Gap 24.0 (H) 7.0 - 16.0    Glucose 111 (H) 65 - 99 mg/dL    Bun 7 (L) 8 - 22 mg/dL    Creatinine 0.75 0.50 - 1.40 mg/dL    Calcium 8.8 8.5 - 10.5 mg/dL    AST(SGOT) 123 (H) 12 - 45 U/L    ALT(SGPT) 45 2 - 50 U/L    Alkaline Phosphatase 390 (H) 30 - 99 U/L    Total Bilirubin 0.6 0.1 - 1.5 mg/dL    Albumin 4.2 3.2 - 4.9 g/dL    Total Protein 7.2 6.0 - 8.2 g/dL    Globulin 3.0 1.9 - 3.5 g/dL    A-G Ratio 1.4 g/dL   ESTIMATED GFR   Result Value Ref Range    GFR If African American >60 >60 mL/min/1.73 m 2    GFR If Non African American >60 >60 mL/min/1.73 m 2       All labs reviewed by me.      RADIOLOGY  CT-HEAD W/O   Final Result      No acute intracranial abnormality.                  MR-BRAIN-WITH & W/O    (Results Pending)     The radiologist's interpretation of all radiological studies have been reviewed by me.    COURSE & MEDICAL DECISION MAKING  Nursing notes, VS, PMSFHx reviewed in chart.     Patient presents emergency department with new onset seizure.  Seizure was witnessed and his oncology doctors office.  Patient is now awake and alert and is returned to baseline.  Patient denies any recent fevers or illness to suggest an infectious etiology for his seizure.  Patient denies any history of metastasis to the brain but states has not had a recent image of his brain.     10:49 AM Patient seen and examined at bedside. The patient presents with a seizure and the differential diagnosis includes but is not limited to electrolyte abnormality, intercranial mass, new onset seizure. Ordered for CT-head without, CBC with differential, and CMP to evaluate.     12:35 PM  I reevaluated the patient at bedside. I discussed the patient's diagnostic study results with the patient. I informed the patient that I would be consulting with Neurology.     12:40 PM Paged  Neurology.    12:48 PM I discussed the patient's case and the above findings with Dr. Barfield (Neurology) who recommends keppra BID and an  MRI.     12:50 PM Paged Dr. Aguilar (Oncology)    1:09 PM I discussed the patient's case and the above findings with Dr. Srivastava (Oncology) who would like the patient to receive an MRI while in the ED today as the treatment will change if mets is discovered on the MRI.    Patient's labs have now returned and do show a CO2 of 16.  I do think he is mildly dehydrated.  He had a normal white count without evidence of bandemia.  Therefore we will give fluids and recheck his BMP.    1:23 PM Ordered MR-Brain with and without for further evaluation.     1:38 PM Nursing staff informed me that the patient is reporting feeling nausea and having mild neck pain.  Patient reports a history of chronic neck pain with no new or different symptoms.  He states he usually lays on his side and has been laying on his back which is exacerbating his pain here.  Patient also reports that when he coughs occasionally he has post office emesis which is what happened here. the patient will be treated with zofran injection 4 mg and tylenol tablet 650 mg for his symptoms.     1:39 PM Patient was reevaluated at bedside. I informed the patient of the plan of care, including an MRI. Patient verbalizes understanding and agreement to this plan of care.     MRI has returned and shows no mass lesions.  Repeat BMP showed a closure of his anion gap and a normal CO2 now.  Normal renal function.  I discussed the case again with Dr. Srivastava.  Patient be monitored overnight by his mom.  Patient will be placed on Keppra until he can be cleared by neurology.  A referral to neurology has been written for.  Seizure precautions were given to mom.  Return precautions were given.    The patient will return for new or worsening symptoms and is stable at the time of discharge.    The patient is referred to a primary physician for  blood pressure management, diabetic screening, and for all other preventative health concerns.      DISPOSITION:  Patient will be discharged home in stable condition.    FOLLOW UP:  Michael Sultana M.D.  [address] [place] [id number] [phone]    Schedule an appointment as soon as possible for a visit in 3 days  If symptoms worsen, return to the er.    OUTPATIENT MEDICATIONS:  New Prescriptions    No medications on file   \     FINAL IMPRESSION  1. Seizure (HCC)    2. Dehydration    3. Malignant neoplasm of kidney, unspecified laterality (HCC)          Vamsi GIL (Tobias), am scribing for, and in the presence of, Elidia Baez M.D..    Electronically signed by: Vamsi Nye (Tobias), 2/10/2022    Elidia GIL M.D. personally performed the services described in this documentation, as scribed by Vamsi Nye in my presence, and it is both accurate and complete.    The note accurately reflects work and decisions made by me.  Elidia Baez M.D.  2/10/2022  4:23 PM

## 2022-02-10 NOTE — PROGRESS NOTES
Pt's mom, Aury, left message yesterday with questions regarding help with getting patient infusions at [facility].  Returned call.  They were on their way to [facility] for a 9:30 appointment.  Let her know that [facility] is a private office and they would need to coordinate this.  She discussed that transportation can be challenging as her other son has to take day off to drive.  Reviewed with her that patient has MTM benefits which assists with transportation.  Provided their number and discussed this could also be a resource for them.  She was unaware of resource and grateful for information.  They plan on talking to Dr Aguilar today about getting his transfusions transferred to closer place.  Encouraged her to call with additional questions or if can help or direct them in any way.

## 2022-02-11 NOTE — DISCHARGE INSTRUCTIONS
Follow-up with neurology for further evaluation of your seizure today.  Also follow-up with your cancer doctor.  I recommend being monitored overnight by your family in case you have another seizure.  I will start you on medications until cleared by neurology.  Drink plenty of fluids and rest. I hope you feel better soon.

## 2022-02-11 NOTE — ED NOTES
Discharge instructions given and explained to pt including f/u. All questions answered and pt verbalized understanding. Discharged via W/C with his mom.

## 2022-02-17 ENCOUNTER — TELEPHONE (OUTPATIENT)
Dept: ONCOLOGY | Facility: MEDICAL CENTER | Age: 57
End: 2022-02-17
Payer: MEDICAID

## 2022-02-17 NOTE — TELEPHONE ENCOUNTER
Per Dr. Aguilar, pt will not be receiving his Keytruda today due to recent seizures. Dr. Aguilar states pt will be off treatment most likely for a month, and provider will let us know when or if the pt will be restarting his treatment. No further orders received at this time.

## 2022-03-10 ENCOUNTER — APPOINTMENT (OUTPATIENT)
Dept: ONCOLOGY | Facility: MEDICAL CENTER | Age: 57
End: 2022-03-10
Attending: INTERNAL MEDICINE
Payer: MEDICAID

## 2022-03-31 ENCOUNTER — OUTPATIENT INFUSION SERVICES (OUTPATIENT)
Dept: ONCOLOGY | Facility: MEDICAL CENTER | Age: 57
End: 2022-03-31
Attending: INTERNAL MEDICINE
Payer: MEDICAID

## 2022-03-31 RX ORDER — PROCHLORPERAZINE MALEATE 10 MG
10 TABLET ORAL EVERY 6 HOURS PRN
Status: CANCELLED | OUTPATIENT
Start: 2022-04-08

## 2022-03-31 RX ORDER — HEPARIN SODIUM (PORCINE) LOCK FLUSH IV SOLN 100 UNIT/ML 100 UNIT/ML
500 SOLUTION INTRAVENOUS PRN
Status: CANCELLED | OUTPATIENT
Start: 2022-04-07

## 2022-03-31 RX ORDER — 0.9 % SODIUM CHLORIDE 0.9 %
VIAL (ML) INJECTION PRN
Status: CANCELLED | OUTPATIENT
Start: 2022-04-08

## 2022-03-31 RX ORDER — 0.9 % SODIUM CHLORIDE 0.9 %
10 VIAL (ML) INJECTION PRN
Status: CANCELLED | OUTPATIENT
Start: 2022-04-07

## 2022-03-31 RX ORDER — ONDANSETRON 2 MG/ML
4 INJECTION INTRAMUSCULAR; INTRAVENOUS PRN
Status: CANCELLED | OUTPATIENT
Start: 2022-04-08

## 2022-03-31 RX ORDER — 0.9 % SODIUM CHLORIDE 0.9 %
VIAL (ML) INJECTION PRN
Status: CANCELLED | OUTPATIENT
Start: 2022-04-07

## 2022-03-31 RX ORDER — 0.9 % SODIUM CHLORIDE 0.9 %
10 VIAL (ML) INJECTION PRN
Status: CANCELLED | OUTPATIENT
Start: 2022-04-08

## 2022-03-31 RX ORDER — 0.9 % SODIUM CHLORIDE 0.9 %
3 VIAL (ML) INJECTION PRN
Status: CANCELLED | OUTPATIENT
Start: 2022-04-08

## 2022-03-31 RX ORDER — 0.9 % SODIUM CHLORIDE 0.9 %
3 VIAL (ML) INJECTION PRN
Status: CANCELLED | OUTPATIENT
Start: 2022-04-07

## 2022-03-31 RX ORDER — HEPARIN SODIUM (PORCINE) LOCK FLUSH IV SOLN 100 UNIT/ML 100 UNIT/ML
500 SOLUTION INTRAVENOUS PRN
Status: CANCELLED | OUTPATIENT
Start: 2022-04-08

## 2022-03-31 RX ORDER — ONDANSETRON 8 MG/1
8 TABLET, ORALLY DISINTEGRATING ORAL PRN
Status: CANCELLED | OUTPATIENT
Start: 2022-04-08

## 2022-03-31 RX ORDER — SODIUM CHLORIDE 9 MG/ML
INJECTION, SOLUTION INTRAVENOUS CONTINUOUS
Status: CANCELLED | OUTPATIENT
Start: 2022-04-08

## 2022-03-31 NOTE — PROGRESS NOTES
Appointment cancelled by physician, because patient didn't show for appointment with oncologist prior treatment at the [facility].. Patient to see Dr. Aguilar on 4/7/2022.

## 2023-01-24 ENCOUNTER — APPOINTMENT (OUTPATIENT)
Dept: RADIOLOGY | Facility: MEDICAL CENTER | Age: 58
DRG: 190 | End: 2023-01-24
Attending: INTERNAL MEDICINE
Payer: MEDICAID

## 2023-01-24 ENCOUNTER — APPOINTMENT (OUTPATIENT)
Dept: CARDIOLOGY | Facility: MEDICAL CENTER | Age: 58
DRG: 190 | End: 2023-01-24
Attending: INTERNAL MEDICINE
Payer: MEDICAID

## 2023-01-24 ENCOUNTER — HOSPITAL ENCOUNTER (INPATIENT)
Facility: MEDICAL CENTER | Age: 58
LOS: 3 days | DRG: 190 | End: 2023-01-27
Attending: INTERNAL MEDICINE | Admitting: INTERNAL MEDICINE
Payer: MEDICAID

## 2023-01-24 DIAGNOSIS — J43.9 PULMONARY EMPHYSEMA, UNSPECIFIED EMPHYSEMA TYPE (HCC): ICD-10-CM

## 2023-01-24 DIAGNOSIS — E27.3 ADRENAL INSUFFICIENCY DUE TO STEROID WITHDRAWAL (HCC): ICD-10-CM

## 2023-01-24 DIAGNOSIS — T38.0X5A ADRENAL INSUFFICIENCY DUE TO STEROID WITHDRAWAL (HCC): ICD-10-CM

## 2023-01-24 PROBLEM — J44.1 ACUTE EXACERBATION OF CHRONIC OBSTRUCTIVE PULMONARY DISEASE (COPD) (HCC): Status: ACTIVE | Noted: 2021-01-27

## 2023-01-24 PROBLEM — J96.90 RESPIRATORY FAILURE (HCC): Status: ACTIVE | Noted: 2023-01-24

## 2023-01-24 PROBLEM — E16.2 HYPOGLYCEMIA: Status: ACTIVE | Noted: 2023-01-24

## 2023-01-24 PROBLEM — J96.01 ACUTE RESPIRATORY FAILURE WITH HYPOXIA (HCC): Status: ACTIVE | Noted: 2023-01-24

## 2023-01-24 PROBLEM — E83.42 HYPOMAGNESEMIA: Status: ACTIVE | Noted: 2023-01-24

## 2023-01-24 PROBLEM — E87.6 HYPOKALEMIA: Status: ACTIVE | Noted: 2023-01-24

## 2023-01-24 PROBLEM — D53.9 MACROCYTIC ANEMIA: Status: ACTIVE | Noted: 2023-01-24

## 2023-01-24 LAB
ALBUMIN SERPL BCP-MCNC: 3.2 G/DL (ref 3.2–4.9)
ALBUMIN/GLOB SERPL: 1.3 G/DL
ALP SERPL-CCNC: 194 U/L (ref 30–99)
ALT SERPL-CCNC: 12 U/L (ref 2–50)
ANION GAP SERPL CALC-SCNC: 23 MMOL/L (ref 7–16)
ANISOCYTOSIS BLD QL SMEAR: ABNORMAL
AST SERPL-CCNC: 46 U/L (ref 12–45)
BASOPHILS # BLD AUTO: 0 % (ref 0–1.8)
BASOPHILS # BLD: 0 K/UL (ref 0–0.12)
BILIRUB SERPL-MCNC: 0.6 MG/DL (ref 0.1–1.5)
BUN SERPL-MCNC: 10 MG/DL (ref 8–22)
BURR CELLS BLD QL SMEAR: NORMAL
CALCIUM ALBUM COR SERPL-MCNC: 8.9 MG/DL (ref 8.5–10.5)
CALCIUM SERPL-MCNC: 8.3 MG/DL (ref 8.5–10.5)
CHLORIDE SERPL-SCNC: 103 MMOL/L (ref 96–112)
CO2 SERPL-SCNC: 14 MMOL/L (ref 20–33)
CREAT SERPL-MCNC: 0.75 MG/DL (ref 0.5–1.4)
EOSINOPHIL # BLD AUTO: 0 K/UL (ref 0–0.51)
EOSINOPHIL NFR BLD: 0 % (ref 0–6.9)
ERYTHROCYTE [DISTWIDTH] IN BLOOD BY AUTOMATED COUNT: 75.7 FL (ref 35.9–50)
GFR SERPLBLD CREATININE-BSD FMLA CKD-EPI: 104 ML/MIN/1.73 M 2
GLOBULIN SER CALC-MCNC: 2.4 G/DL (ref 1.9–3.5)
GLUCOSE BLD STRIP.AUTO-MCNC: 127 MG/DL (ref 65–99)
GLUCOSE BLD STRIP.AUTO-MCNC: 146 MG/DL (ref 65–99)
GLUCOSE BLD STRIP.AUTO-MCNC: 59 MG/DL (ref 65–99)
GLUCOSE SERPL-MCNC: 61 MG/DL (ref 65–99)
HCT VFR BLD AUTO: 34.6 % (ref 42–52)
HGB BLD-MCNC: 11.8 G/DL (ref 14–18)
LV EJECT FRACT  99904: 65
LV EJECT FRACT MOD 2C 99903: 70.87
LV EJECT FRACT MOD 4C 99902: 61.63
LV EJECT FRACT MOD BP 99901: 65.98
LYMPHOCYTES # BLD AUTO: 0.13 K/UL (ref 1–4.8)
LYMPHOCYTES NFR BLD: 3.4 % (ref 22–41)
MACROCYTES BLD QL SMEAR: ABNORMAL
MAGNESIUM SERPL-MCNC: 1.5 MG/DL (ref 1.5–2.5)
MANUAL DIFF BLD: NORMAL
MCH RBC QN AUTO: 38.8 PG (ref 27–33)
MCHC RBC AUTO-ENTMCNC: 34.1 G/DL (ref 33.7–35.3)
MCV RBC AUTO: 113.8 FL (ref 81.4–97.8)
MONOCYTES # BLD AUTO: 0.03 K/UL (ref 0–0.85)
MONOCYTES NFR BLD AUTO: 0.9 % (ref 0–13.4)
MORPHOLOGY BLD-IMP: NORMAL
NEUTROPHILS # BLD AUTO: 3.54 K/UL (ref 1.82–7.42)
NEUTROPHILS NFR BLD: 95.7 % (ref 44–72)
NRBC # BLD AUTO: 0 K/UL
NRBC BLD-RTO: 0 /100 WBC
PHOSPHATE SERPL-MCNC: 5.5 MG/DL (ref 2.5–4.5)
PLATELET # BLD AUTO: 195 K/UL (ref 164–446)
PLATELET BLD QL SMEAR: NORMAL
PMV BLD AUTO: 8.2 FL (ref 9–12.9)
POIKILOCYTOSIS BLD QL SMEAR: NORMAL
POLYCHROMASIA BLD QL SMEAR: NORMAL
POTASSIUM SERPL-SCNC: 3.4 MMOL/L (ref 3.6–5.5)
PROCALCITONIN SERPL-MCNC: 1.01 NG/ML
PROT SERPL-MCNC: 5.6 G/DL (ref 6–8.2)
RBC # BLD AUTO: 3.04 M/UL (ref 4.7–6.1)
RBC BLD AUTO: PRESENT
SODIUM SERPL-SCNC: 140 MMOL/L (ref 135–145)
TROPONIN T SERPL-MCNC: 32 NG/L (ref 6–19)
WBC # BLD AUTO: 3.7 K/UL (ref 4.8–10.8)

## 2023-01-24 PROCEDURE — 85025 COMPLETE CBC W/AUTO DIFF WBC: CPT

## 2023-01-24 PROCEDURE — 93306 TTE W/DOPPLER COMPLETE: CPT | Mod: 26 | Performed by: INTERNAL MEDICINE

## 2023-01-24 PROCEDURE — 700101 HCHG RX REV CODE 250: Performed by: INTERNAL MEDICINE

## 2023-01-24 PROCEDURE — 700102 HCHG RX REV CODE 250 W/ 637 OVERRIDE(OP): Performed by: INTERNAL MEDICINE

## 2023-01-24 PROCEDURE — 99292 CRITICAL CARE ADDL 30 MIN: CPT | Performed by: INTERNAL MEDICINE

## 2023-01-24 PROCEDURE — 84100 ASSAY OF PHOSPHORUS: CPT

## 2023-01-24 PROCEDURE — 700111 HCHG RX REV CODE 636 W/ 250 OVERRIDE (IP): Performed by: INTERNAL MEDICINE

## 2023-01-24 PROCEDURE — 85007 BL SMEAR W/DIFF WBC COUNT: CPT

## 2023-01-24 PROCEDURE — 700105 HCHG RX REV CODE 258: Performed by: INTERNAL MEDICINE

## 2023-01-24 PROCEDURE — 99291 CRITICAL CARE FIRST HOUR: CPT | Performed by: INTERNAL MEDICINE

## 2023-01-24 PROCEDURE — 94760 N-INVAS EAR/PLS OXIMETRY 1: CPT

## 2023-01-24 PROCEDURE — 770022 HCHG ROOM/CARE - ICU (200)

## 2023-01-24 PROCEDURE — 83735 ASSAY OF MAGNESIUM: CPT

## 2023-01-24 PROCEDURE — 84145 PROCALCITONIN (PCT): CPT

## 2023-01-24 PROCEDURE — 94640 AIRWAY INHALATION TREATMENT: CPT

## 2023-01-24 PROCEDURE — 80053 COMPREHEN METABOLIC PANEL: CPT

## 2023-01-24 PROCEDURE — 84484 ASSAY OF TROPONIN QUANT: CPT

## 2023-01-24 PROCEDURE — A9270 NON-COVERED ITEM OR SERVICE: HCPCS | Performed by: INTERNAL MEDICINE

## 2023-01-24 PROCEDURE — 94669 MECHANICAL CHEST WALL OSCILL: CPT

## 2023-01-24 PROCEDURE — 36600 WITHDRAWAL OF ARTERIAL BLOOD: CPT

## 2023-01-24 PROCEDURE — 93306 TTE W/DOPPLER COMPLETE: CPT

## 2023-01-24 PROCEDURE — 71045 X-RAY EXAM CHEST 1 VIEW: CPT

## 2023-01-24 PROCEDURE — 82962 GLUCOSE BLOOD TEST: CPT

## 2023-01-24 RX ORDER — IPRATROPIUM BROMIDE AND ALBUTEROL SULFATE 2.5; .5 MG/3ML; MG/3ML
3 SOLUTION RESPIRATORY (INHALATION)
Status: DISCONTINUED | OUTPATIENT
Start: 2023-01-24 | End: 2023-01-26

## 2023-01-24 RX ORDER — DEXTROSE, SODIUM CHLORIDE, SODIUM LACTATE, POTASSIUM CHLORIDE, AND CALCIUM CHLORIDE 5; .6; .31; .03; .02 G/100ML; G/100ML; G/100ML; G/100ML; G/100ML
INJECTION, SOLUTION INTRAVENOUS CONTINUOUS
Status: DISCONTINUED | OUTPATIENT
Start: 2023-01-24 | End: 2023-01-26

## 2023-01-24 RX ORDER — METHYLPREDNISOLONE SODIUM SUCCINATE 40 MG/ML
40 INJECTION, POWDER, LYOPHILIZED, FOR SOLUTION INTRAMUSCULAR; INTRAVENOUS EVERY 8 HOURS
Status: DISCONTINUED | OUTPATIENT
Start: 2023-01-24 | End: 2023-01-26

## 2023-01-24 RX ORDER — PROMETHAZINE HYDROCHLORIDE 25 MG/1
12.5-25 SUPPOSITORY RECTAL EVERY 4 HOURS PRN
Status: DISCONTINUED | OUTPATIENT
Start: 2023-01-24 | End: 2023-01-27 | Stop reason: HOSPADM

## 2023-01-24 RX ORDER — POLYETHYLENE GLYCOL 3350 17 G/17G
1 POWDER, FOR SOLUTION ORAL
Status: DISCONTINUED | OUTPATIENT
Start: 2023-01-24 | End: 2023-01-27 | Stop reason: HOSPADM

## 2023-01-24 RX ORDER — AZITHROMYCIN 500 MG/5ML
500 INJECTION, POWDER, LYOPHILIZED, FOR SOLUTION INTRAVENOUS EVERY 24 HOURS
Status: DISCONTINUED | OUTPATIENT
Start: 2023-01-25 | End: 2023-01-25

## 2023-01-24 RX ORDER — POTASSIUM CHLORIDE 20 MEQ/1
60 TABLET, EXTENDED RELEASE ORAL ONCE
Status: COMPLETED | OUTPATIENT
Start: 2023-01-24 | End: 2023-01-24

## 2023-01-24 RX ORDER — PROMETHAZINE HYDROCHLORIDE 25 MG/1
12.5-25 TABLET ORAL EVERY 4 HOURS PRN
Status: DISCONTINUED | OUTPATIENT
Start: 2023-01-24 | End: 2023-01-27 | Stop reason: HOSPADM

## 2023-01-24 RX ORDER — PROCHLORPERAZINE EDISYLATE 5 MG/ML
5-10 INJECTION INTRAMUSCULAR; INTRAVENOUS EVERY 4 HOURS PRN
Status: DISCONTINUED | OUTPATIENT
Start: 2023-01-24 | End: 2023-01-27 | Stop reason: HOSPADM

## 2023-01-24 RX ORDER — ONDANSETRON 4 MG/1
4 TABLET, ORALLY DISINTEGRATING ORAL EVERY 4 HOURS PRN
Status: DISCONTINUED | OUTPATIENT
Start: 2023-01-24 | End: 2023-01-27 | Stop reason: HOSPADM

## 2023-01-24 RX ORDER — ACETAMINOPHEN 325 MG/1
650 TABLET ORAL EVERY 6 HOURS PRN
Status: DISCONTINUED | OUTPATIENT
Start: 2023-01-24 | End: 2023-01-27 | Stop reason: HOSPADM

## 2023-01-24 RX ORDER — ALPRAZOLAM 0.25 MG/1
0.25 TABLET ORAL 4 TIMES DAILY PRN
Status: DISCONTINUED | OUTPATIENT
Start: 2023-01-24 | End: 2023-01-27 | Stop reason: HOSPADM

## 2023-01-24 RX ORDER — MAGNESIUM SULFATE HEPTAHYDRATE 40 MG/ML
4 INJECTION, SOLUTION INTRAVENOUS ONCE
Status: COMPLETED | OUTPATIENT
Start: 2023-01-24 | End: 2023-01-24

## 2023-01-24 RX ORDER — ONDANSETRON 2 MG/ML
4 INJECTION INTRAMUSCULAR; INTRAVENOUS EVERY 4 HOURS PRN
Status: DISCONTINUED | OUTPATIENT
Start: 2023-01-24 | End: 2023-01-27 | Stop reason: HOSPADM

## 2023-01-24 RX ORDER — BISACODYL 10 MG
10 SUPPOSITORY, RECTAL RECTAL
Status: DISCONTINUED | OUTPATIENT
Start: 2023-01-24 | End: 2023-01-27 | Stop reason: HOSPADM

## 2023-01-24 RX ORDER — PREDNISONE 10 MG/1
10 TABLET ORAL DAILY
Status: ON HOLD | COMMUNITY
End: 2023-01-27 | Stop reason: SDUPTHER

## 2023-01-24 RX ORDER — MELOXICAM 7.5 MG/1
7.5 TABLET ORAL DAILY
COMMUNITY

## 2023-01-24 RX ORDER — ENOXAPARIN SODIUM 100 MG/ML
40 INJECTION SUBCUTANEOUS DAILY
Status: DISCONTINUED | OUTPATIENT
Start: 2023-01-24 | End: 2023-01-26

## 2023-01-24 RX ORDER — AMOXICILLIN 250 MG
2 CAPSULE ORAL 2 TIMES DAILY
Status: DISCONTINUED | OUTPATIENT
Start: 2023-01-24 | End: 2023-01-27 | Stop reason: HOSPADM

## 2023-01-24 RX ADMIN — ALPRAZOLAM 0.25 MG: 0.25 TABLET ORAL at 16:11

## 2023-01-24 RX ADMIN — METHYLPREDNISOLONE SODIUM SUCCINATE 40 MG: 40 INJECTION, POWDER, FOR SOLUTION INTRAMUSCULAR; INTRAVENOUS at 14:11

## 2023-01-24 RX ADMIN — METHYLPREDNISOLONE SODIUM SUCCINATE 40 MG: 40 INJECTION, POWDER, FOR SOLUTION INTRAMUSCULAR; INTRAVENOUS at 21:30

## 2023-01-24 RX ADMIN — Medication 1 APPLICATOR: at 17:20

## 2023-01-24 RX ADMIN — IPRATROPIUM BROMIDE AND ALBUTEROL SULFATE 3 ML: 2.5; .5 SOLUTION RESPIRATORY (INHALATION) at 20:12

## 2023-01-24 RX ADMIN — POTASSIUM CHLORIDE 60 MEQ: 1500 TABLET, EXTENDED RELEASE ORAL at 11:13

## 2023-01-24 RX ADMIN — MAGNESIUM SULFATE HEPTAHYDRATE 4 G: 40 INJECTION, SOLUTION INTRAVENOUS at 11:13

## 2023-01-24 RX ADMIN — ENOXAPARIN SODIUM 40 MG: 40 INJECTION SUBCUTANEOUS at 17:17

## 2023-01-24 RX ADMIN — METHYLPREDNISOLONE SODIUM SUCCINATE 40 MG: 40 INJECTION, POWDER, FOR SOLUTION INTRAMUSCULAR; INTRAVENOUS at 07:45

## 2023-01-24 RX ADMIN — IPRATROPIUM BROMIDE AND ALBUTEROL SULFATE 3 ML: 2.5; .5 SOLUTION RESPIRATORY (INHALATION) at 09:52

## 2023-01-24 RX ADMIN — SODIUM CHLORIDE, SODIUM LACTATE, POTASSIUM CHLORIDE, CALCIUM CHLORIDE AND DEXTROSE MONOHYDRATE: 5; 600; 310; 30; 20 INJECTION, SOLUTION INTRAVENOUS at 07:48

## 2023-01-24 RX ADMIN — SODIUM CHLORIDE, SODIUM LACTATE, POTASSIUM CHLORIDE, CALCIUM CHLORIDE AND DEXTROSE MONOHYDRATE: 5; 600; 310; 30; 20 INJECTION, SOLUTION INTRAVENOUS at 19:17

## 2023-01-24 RX ADMIN — IPRATROPIUM BROMIDE AND ALBUTEROL SULFATE 3 ML: 2.5; .5 SOLUTION RESPIRATORY (INHALATION) at 22:54

## 2023-01-24 RX ADMIN — DEXTROSE MONOHYDRATE 25 G: 100 INJECTION, SOLUTION INTRAVENOUS at 07:44

## 2023-01-24 ASSESSMENT — ENCOUNTER SYMPTOMS
DIARRHEA: 0
DIZZINESS: 0
CHILLS: 1
VOMITING: 0
BRUISES/BLEEDS EASILY: 0
COUGH: 0
NERVOUS/ANXIOUS: 1
DEPRESSION: 0
NECK PAIN: 0
NAUSEA: 0
FOCAL WEAKNESS: 0
FEVER: 0
HEADACHES: 0
SHORTNESS OF BREATH: 1
EYE REDNESS: 0
EYE DISCHARGE: 0
SORE THROAT: 0
ABDOMINAL PAIN: 0
SENSORY CHANGE: 0
BACK PAIN: 0
SPEECH CHANGE: 0
FLANK PAIN: 0

## 2023-01-24 ASSESSMENT — PAIN DESCRIPTION - PAIN TYPE
TYPE: ACUTE PAIN

## 2023-01-24 ASSESSMENT — FIBROSIS 4 INDEX: FIB4 SCORE: 5.21

## 2023-01-24 NOTE — CARE PLAN
The patient is Watcher - Medium risk of patient condition declining or worsening    Shift Goals  Clinical Goals: Decrease O2 demands, Comfort, anxiety control  Patient Goals: Sleep, comfort  Family Goals: GRETCHEN    Progress made toward(s) clinical / shift goals:  Patient is resting in bed on high flow NC, advance diet as tolerated, monitoring oxygen demands, early mobility.     Problem: Knowledge Deficit - Standard  Goal: Patient and family/care givers will demonstrate understanding of plan of care, disease process/condition, diagnostic tests and medications  Outcome: Progressing     Problem: Knowledge Deficit - COPD  Goal: Patient/significant other demonstrates understanding of disease process, utilization of the Action Plan, medications and discharge instruction  Outcome: Progressing     Problem: Risk for Infection - COPD  Goal: Patient will remain free from signs and symptoms of infection  Outcome: Progressing     Problem: Nutrition - Advanced  Goal: Patient will display progressive weight gain toward goal have adequate food and fluid intake  Outcome: Progressing     Problem: Ineffective Airway Clearance  Goal: Patient will maintain patent airway with clear/clearing breath sounds  Outcome: Progressing     Problem: Impaired Gas Exchange  Goal: Patient will demonstrate improved ventilation and adequate oxygenation and participate in treatment regimen within the level of ability/situation.  Outcome: Progressing     Problem: Risk for Aspiration  Goal: Patient's risk for aspiration will be absent or decrease  Outcome: Progressing     Problem: Self Care  Goal: Patient will have the ability to perform ADLs independently or with assistance (bathe, groom, dress, toilet and feed)  Outcome: Progressing     Problem: Skin Integrity  Goal: Skin integrity is maintained or improved  Outcome: Progressing

## 2023-01-24 NOTE — WOUND TEAM
[facility]  Inpatient Services  Wound and Skin Care Brief Evaluation    Admission Date: 1/24/2023     Last order of IP CONSULT TO WOUND CARE was found on 1/24/2023 from Hospital Encounter on 1/24/2023     HPI, PMH, SH: Reviewed    No chief complaint on file.    Diagnosis: Respiratory failure (HCC) [J96.90]    Unit where seen by Wound Team: S125/00     Wound consult placed regarding buttocks, bilateral heels. Chart and images reviewed. This discussed with bedside RN Luke . This RN in to assess patient. Pt pleasant and agreeable. Patient bilateral heels were red but all areas blanchable, applied heel mepilex to protect. Patient sacrum and buttocks all pink and blanching, Patient did have more redness on left buttock/IT area and complained of some discomfort, but at this time all areas blanchable. Offloaded the sacrum and left buttock with mepilex. No pressure injuries or advanced wound care needs identified. Wound consult completed. No further follow up unless indicated and consulted.                   RSKIN:   CURRENTLY IN PLACE (X), APPLIED THIS VISIT (A), ORDERED (O):   Q shift Josef:  X  Q shift pressure point assessments:  X    Surface/Positioning   Pressure redistribution mattress            Low Airloss     X ICU     Bariatric foam      Bariatric ANALY     Waffle cushion        Waffle Overlay          Reposition q 2 hours    X  TAPs Turning system     Z Mike Pillow     Offloading/Redistribution   Sacral Mepilex (Silicone dressing)   X  Heel Mepilex (Silicone dressing)    A, O     Heel float boots (Prevalon boot)  O           Float Heels off Bed with Pillows     O      Respiratory high flow  Silicone O2 tubing         Gray Foam Ear protectors     Cannula fixation Device (Tender )   O       High flow offloading Clip  O  Elastic head band offloading device      Anchorfast                                                         Trach with Optifoam split foam             Containment/Moisture  Prevention n/a    Rectal tube or BMS    Purwick/Condom Cath        Romeo Catheter    Barrier wipes           Barrier paste       Antifungal tx      Interdry        Mobilization GRETCHEN      Up to chair        Ambulate      PT/OT      Nutrition       Dietician        Diabetes Education      PO   X  TF     TPN     NPO   # days     Other

## 2023-01-24 NOTE — PROGRESS NOTES
0638 Patient arrived to S125      /77  O2 100  RR 30  Temp 98.3f  Weight 52.3kg    4 Eyes Skin Assessment Completed by Pita RN and TYRESE Hodges.    Head R anterior forehead abrasion/redness  Ears WDL  Nose WDL  Mouth WDL  Neck WDL  Breast/Chest WDL  Shoulder Blades WDL  Spine WDL  (R) Arm/Elbow/Hand Redness and Blanching  (L) Arm/Elbow/Hand Redness and Blanching  Abdomen WDL  Groin WDL  Scrotum/Coccyx/Buttocks Redness slow to raina  (R) Leg Abrasions multiple/scattered  (L) Leg Abrasion multiple/scattered  (R) Heel/Foot/Toe Redness and Boggy heels  (L) Heel/Foot/Toe Redness and Boggy heels          Devices In Places ECG, Blood Pressure Cuff, Pulse Ox, SCD's, and Bipap      Interventions In Place Sacral Mepilex, TAP System, Pillows, Q2 Turns, Low Air Loss Mattress, and Heels Loaded W/Pillows    Possible Skin Injury Yes    Pictures Uploaded Into Epic Yes  Wound Consult Placed N/A  RN Wound Prevention Protocol Ordered No

## 2023-01-24 NOTE — ASSESSMENT & PLAN NOTE
Replete to goals > 4   Sarecycline Counseling: Patient advised regarding possible photosensitivity and discoloration of the teeth, skin, lips, tongue and gums.  Patient instructed to avoid sunlight, if possible.  When exposed to sunlight, patients should wear protective clothing, sunglasses, and sunscreen.  The patient was instructed to call the office immediately if the following severe adverse effects occur:  hearing changes, easy bruising/bleeding, severe headache, or vision changes.  The patient verbalized understanding of the proper use and possible adverse effects of sarecycline.  All of the patient's questions and concerns were addressed.

## 2023-01-24 NOTE — H&P
Critical Care History & Physical Note    Date of Service  1/24/2023    Primary Care Physician  Michael Sultana M.D.    Consultants  critical care and pulmonary    Code Status  Full Code    Chief Complaint  Altered mental status      History of Presenting Illness  Mr. Heath is a 57-year-old male with a past medical history significant for metastatic renal cell carcinoma with lung metastases, COPD, ongoing tobacco abuse, hypertension, and a history of nephrectomy who is been off chemotherapy for about 3 months due to side effects from neuro toxicities who was found with altered mental status in his trailer earlier today with a glucose of 27 and hypothermic at 35 °C.  He was brought to [facility], [place], operated by [facility] as the patient lives in [place] where he was given IV glucose with an improved glucose count to 200.  His mental status did improve after rewarming and repeating his glucose.  Unfortunately, the patient had significant respiratory distress and required BiPAP therapy.  The patient was given breathing treatments and antibiotics of azithromycin and ceftriaxone and then sent to [facility] as a direct admit for higher level of care and ongoing BiPAP management.    I discussed the plan of care with patient, bedside RN, charge RN, pharmacy, and RT.    Review of Systems  Review of Systems   Constitutional:  Positive for chills and malaise/fatigue. Negative for fever.   HENT:  Negative for congestion and sore throat.    Eyes:  Negative for discharge and redness.   Respiratory:  Positive for shortness of breath. Negative for cough.    Cardiovascular:  Negative for chest pain and leg swelling.   Gastrointestinal:  Negative for abdominal pain, diarrhea, nausea and vomiting.   Genitourinary:  Negative for flank pain and hematuria.   Musculoskeletal:  Negative for back pain and neck pain.   Skin:  Negative for rash.   Neurological:  Negative for dizziness, sensory change, speech change, focal weakness  and headaches.   Endo/Heme/Allergies:  Does not bruise/bleed easily.   Psychiatric/Behavioral:  Negative for depression. The patient is nervous/anxious.      Past Medical History   has a past medical history of Bowel habit changes (2021), Breath shortness (07/2021), Cancer (HCC) (7/2017), COPD (chronic obstructive pulmonary disease) (HCC), Emphysema of lung (HCC) (2010), Pain, Pain (07/2021), Pneumonia (07/2021), Psychiatric problem, Renal disorder (2017), and Urinary bladder disorder.    Surgical History   has a past surgical history that includes nephrectomy radical (Right, 8/23/2017) and other (Left, 2021).     Family History  Cancer for mother and father, but doesn't know which kind    Social History   reports that he has been smoking cigarettes. He has a 7.50 pack-year smoking history. He has never used smokeless tobacco. He reports current alcohol use. He reports current drug use. Drug: Inhaled.    Allergies  No Known Allergies    Medications  Prior to Admission Medications   Prescriptions Last Dose Informant Patient Reported? Taking?   Axitinib (INLYTA) 5 MG Tab   Yes No   Sig: Take  by mouth 2 times a day.   KEYTRUDA 100 MG/4ML Solution injection   Yes No   aspirin (ASA) 81 MG Chew Tab chewable tablet  Patient Yes No   Sig: Chew 81 mg every day.   Patient not taking: Reported on 1/20/2022   levETIRAcetam (KEPPRA) 500 MG Tab   No No   Sig: Take 1 Tablet by mouth 2 times a day.   levalbuterol (XOPENEX HFA) 45 MCG/ACT inhaler  Patient Yes No   Sig: Inhale 1-2 Puffs every morning as needed for Shortness of Breath.   loratadine (CLARITIN) 10 MG Tab   Yes No   nicotine polacrilex (NICORETTE) 4 MG gum  Patient Yes No   Sig: Take 4 mg by mouth as needed.   sertraline (ZOLOFT) 100 MG Tab  Patient Yes No   Sig: Take 100 mg by mouth every day.      Facility-Administered Medications: None         Physical Exam  Vitals and nursing note reviewed.   Constitutional:       General: He is in acute distress.      Appearance:  He is ill-appearing. He is not toxic-appearing.   HENT:      Head: Normocephalic and atraumatic.      Right Ear: External ear normal.      Left Ear: External ear normal.      Nose: Nose normal. No rhinorrhea.      Mouth/Throat:      Mouth: Mucous membranes are moist.      Comments: BiPAP in place  Eyes:      General: No scleral icterus.     Conjunctiva/sclera: Conjunctivae normal.      Pupils: Pupils are equal, round, and reactive to light.   Cardiovascular:      Rate and Rhythm: Normal rate and regular rhythm.      Pulses:           Radial pulses are 2+ on the right side and 2+ on the left side.        Dorsalis pedis pulses are 1+ on the right side and 1+ on the left side.      Heart sounds: Heart sounds are distant. No murmur heard.  Pulmonary:      Breath sounds: No wheezing.      Comments: Somewhat anxious on BiPAP, diminished throughout  Chest:      Chest wall: No tenderness.   Abdominal:      Palpations: Abdomen is soft. There is no mass.      Tenderness: There is no guarding.   Musculoskeletal:         General: Normal range of motion.      Cervical back: Normal range of motion and neck supple.      Right lower leg: No edema.      Left lower leg: No edema.   Lymphadenopathy:      Cervical: No cervical adenopathy.   Skin:     General: Skin is warm and dry.      Capillary Refill: Capillary refill takes less than 2 seconds.      Findings: No rash.   Neurological:      Mental Status: He is alert and oriented to person, place, and time.      Cranial Nerves: No cranial nerve deficit.      Sensory: No sensory deficit.      Motor: No weakness.   Psychiatric:         Attention and Perception: Attention normal.         Mood and Affect: Mood is anxious and elated.         Speech: Speech normal.         Behavior: Behavior is cooperative.         Thought Content: Thought content normal.         Cognition and Memory: Cognition normal.       Laboratory:    Labs reviewed from [facility]:    WBC 5.5, hemoglobin  13.5, hematocrit 38.1, platelets 304    Sodium 138, potassium 3.9, chloride 102, CO2 18, glucose 110, BUN 12, creatinine 0.8    Alk phos 222, , ALT 15, calcium 8.4, protein 5.9, albumin 2.9, total bili 1.1    Lactic acid 5.5    Lipase 161    Troponin I 10.9    Influenza A negative, influenza B negative, COVID negative    AB.26/38.5/224 on 100% FiO2 with BiPAP at 15/5    Imaging:  EC-ECHOCARDIOGRAM COMPLETE W/O CONT    (Results Pending)   DX-CHEST-PORTABLE (1 VIEW)    (Results Pending)     CXR:    1.  Interval development of nodular opacities within the right lung which are indeterminate, infectious/inflammatory versus neoplastic.  2.  Similar appearance of volume loss in the left lung with interstitial hazy opacity and chronic blunting of the left costophrenic angle.     Assessment/Plan:  Justification for Admission Status  I anticipate this patient will require at least two midnights for appropriate medical management, necessitating inpatient admission because acute hypoxic respiratory failure due to COPD exacerbation.    Patient will need a ICU (Adult and Pediatrics) bed on MEDICAL service .  The need is secondary to acute hypoxic respiratory failure due to COPD exacerbation requiring BiPAP and high flow nasal cannula.    * Acute respiratory failure with hypoxia (HCC)- (present on admission)  Assessment & Plan  Likely due to COPD exacerbation  Continue BiPAP at 12/8 at 60%-->breaks with HFNC at 60/60%  RT/O2 protocols  Diuresis as needed  Low threshold to intubate    Macrocytic anemia- (present on admission)  Assessment & Plan  No acute blood loss noted  Hb goals > 7    Hypomagnesemia- (present on admission)  Assessment & Plan  Replete to goal > 2    Hypokalemia- (present on admission)  Assessment & Plan  Replete to goals > 4    Hypoglycemia- (present on admission)  Assessment & Plan  ?due to metastatic disease  D5LR at 83cc/hr  BG checks 1-2 hours    Acute exacerbation of chronic obstructive pulmonary  disease (COPD) (HCC)  Assessment & Plan  RT/O2 protocols  BiPAP<-->HFNC  O2 sat goals > 88%  Scheduled steroids  Scheduled and PRN bronchodilators  Azithromycin       Renal carcinoma (HCC)- (present on admission)  Assessment & Plan  With metastatic disease to lungs  ?disease to liver or bones  Off chemotherapy for 3 months due to neuro side effects  Palliative care consulted for advance care planning        VTE prophylaxis: SCDs/TEDs and enoxaparin ppx    It is critically ill at this time requiring BiPAP with breaks with high flow nasal cannula due to acute hypoxic respiratory failure due to COPD exacerbation.  I have assessed and reassessed the patient's mental status, respiratory status, and laboratories throughout the day.  Patient remains at high risk of clinical deterioration, worsening vital organ dysfunction, and death without the above critical care interventions.    Critical care time equals 112 minutes.

## 2023-01-24 NOTE — ASSESSMENT & PLAN NOTE
Likely due to COPD exacerbation  Improving and transitioned to NC this morning  RT/O2 protocols  Diuresis as needed

## 2023-01-24 NOTE — ASSESSMENT & PLAN NOTE
With metastatic disease to lungs  ?disease to liver or bones  Off chemotherapy for 3 months due to neuro side effects  Palliative care consulted for advance care planning-->FULL CODE

## 2023-01-24 NOTE — PROGRESS NOTES
[facility] TRIAGE [facility]ICER DIRECT ADMISSION REPORT  Transferring facility: [facility], [place], operated by [facility]  Transferring physician: Dr Boyce  Transferring facility/physician contact number: per RTOC  Chief complaint: AMS  Pertinent history & patient course: 57y M with metastatic renal cell carcinoma with lung metastasis, COPD, tobacco abuse, HTN, hx of nephrectomy. That per report has been off chemo for 3 months due to side effects of neuro toxicities. He was found AMS in his trailer glucose of 27 and hypothermic to 35 degree C. He had significant respiratory distress placed on bipap. His mental status improved repeat glucose was 200. He was given breathing treatments and antibiotics. He is severely malnourished. No ability to admit so needs to transfer to higher level of care.   Pertinent imaging & lab results: unremarkable except above.   Code Status: Full per transferring provider, I personally verified with the transferring provider patient's code status and the transferring provider has confirmed this with the patient.  Further work up or recommendations per triage officer prior to transfer: no  Consultants called prior to transfer and pertinent input from consultants: no  Patient accepted for transfer: Yes  Consultants to be called upon arrival: none  Admission status: Inpatient.   Floor requested: ICU  If ICU transfer, name of intensivist case discussed with and pertinent input from critical care: Tiki    Please inform the triage officer upon arrival of the patient to [facility] for assignment of a hospitalist to perform admission.     For any question or concerns regarding the care of this patient, please reach out to the assigned hospitalist.

## 2023-01-24 NOTE — ASSESSMENT & PLAN NOTE
RT/O2 protocols  HFNC-->transitioned to NC with marked improvement of distress overall  O2 sat goals > 88%  Scheduled steroids  Scheduled and PRN bronchodilators  S/p Azithromycin for 3 days

## 2023-01-25 LAB
ANION GAP SERPL CALC-SCNC: 10 MMOL/L (ref 7–16)
BASOPHILS # BLD AUTO: 0.4 % (ref 0–1.8)
BASOPHILS # BLD: 0.03 K/UL (ref 0–0.12)
BUN SERPL-MCNC: 10 MG/DL (ref 8–22)
CALCIUM SERPL-MCNC: 8 MG/DL (ref 8.5–10.5)
CHLORIDE SERPL-SCNC: 104 MMOL/L (ref 96–112)
CO2 SERPL-SCNC: 21 MMOL/L (ref 20–33)
CREAT SERPL-MCNC: 0.56 MG/DL (ref 0.5–1.4)
EOSINOPHIL # BLD AUTO: 0 K/UL (ref 0–0.51)
EOSINOPHIL NFR BLD: 0 % (ref 0–6.9)
ERYTHROCYTE [DISTWIDTH] IN BLOOD BY AUTOMATED COUNT: 77.5 FL (ref 35.9–50)
GFR SERPLBLD CREATININE-BSD FMLA CKD-EPI: 114 ML/MIN/1.73 M 2
GLUCOSE SERPL-MCNC: 173 MG/DL (ref 65–99)
HCT VFR BLD AUTO: 32.6 % (ref 42–52)
HGB BLD-MCNC: 11.4 G/DL (ref 14–18)
IMM GRANULOCYTES # BLD AUTO: 0.06 K/UL (ref 0–0.11)
IMM GRANULOCYTES NFR BLD AUTO: 0.7 % (ref 0–0.9)
LYMPHOCYTES # BLD AUTO: 0.35 K/UL (ref 1–4.8)
LYMPHOCYTES NFR BLD: 4.1 % (ref 22–41)
MAGNESIUM SERPL-MCNC: 2 MG/DL (ref 1.5–2.5)
MCH RBC QN AUTO: 39.7 PG (ref 27–33)
MCHC RBC AUTO-ENTMCNC: 35 G/DL (ref 33.7–35.3)
MCV RBC AUTO: 113.6 FL (ref 81.4–97.8)
MONOCYTES # BLD AUTO: 0.3 K/UL (ref 0–0.85)
MONOCYTES NFR BLD AUTO: 3.5 % (ref 0–13.4)
NEUTROPHILS # BLD AUTO: 7.83 K/UL (ref 1.82–7.42)
NEUTROPHILS NFR BLD: 91.3 % (ref 44–72)
NRBC # BLD AUTO: 0 K/UL
NRBC BLD-RTO: 0 /100 WBC
PHOSPHATE SERPL-MCNC: 3.7 MG/DL (ref 2.5–4.5)
PLATELET # BLD AUTO: 190 K/UL (ref 164–446)
PMV BLD AUTO: 8.7 FL (ref 9–12.9)
POTASSIUM SERPL-SCNC: 4.6 MMOL/L (ref 3.6–5.5)
RBC # BLD AUTO: 2.87 M/UL (ref 4.7–6.1)
SODIUM SERPL-SCNC: 135 MMOL/L (ref 135–145)
WBC # BLD AUTO: 8.6 K/UL (ref 4.8–10.8)

## 2023-01-25 PROCEDURE — 84100 ASSAY OF PHOSPHORUS: CPT

## 2023-01-25 PROCEDURE — 94640 AIRWAY INHALATION TREATMENT: CPT

## 2023-01-25 PROCEDURE — 99406 BEHAV CHNG SMOKING 3-10 MIN: CPT

## 2023-01-25 PROCEDURE — 700111 HCHG RX REV CODE 636 W/ 250 OVERRIDE (IP): Performed by: INTERNAL MEDICINE

## 2023-01-25 PROCEDURE — 94664 DEMO&/EVAL PT USE INHALER: CPT

## 2023-01-25 PROCEDURE — 85025 COMPLETE CBC W/AUTO DIFF WBC: CPT

## 2023-01-25 PROCEDURE — 83735 ASSAY OF MAGNESIUM: CPT

## 2023-01-25 PROCEDURE — 94669 MECHANICAL CHEST WALL OSCILL: CPT

## 2023-01-25 PROCEDURE — 700101 HCHG RX REV CODE 250: Performed by: INTERNAL MEDICINE

## 2023-01-25 PROCEDURE — 700105 HCHG RX REV CODE 258: Performed by: INTERNAL MEDICINE

## 2023-01-25 PROCEDURE — 99291 CRITICAL CARE FIRST HOUR: CPT | Performed by: INTERNAL MEDICINE

## 2023-01-25 PROCEDURE — 80048 BASIC METABOLIC PNL TOTAL CA: CPT

## 2023-01-25 PROCEDURE — 94760 N-INVAS EAR/PLS OXIMETRY 1: CPT

## 2023-01-25 PROCEDURE — 770022 HCHG ROOM/CARE - ICU (200)

## 2023-01-25 RX ORDER — AZITHROMYCIN 250 MG/1
500 TABLET, FILM COATED ORAL DAILY
Status: COMPLETED | OUTPATIENT
Start: 2023-01-26 | End: 2023-01-26

## 2023-01-25 RX ADMIN — ENOXAPARIN SODIUM 40 MG: 40 INJECTION SUBCUTANEOUS at 17:37

## 2023-01-25 RX ADMIN — Medication 1 APPLICATOR: at 05:30

## 2023-01-25 RX ADMIN — IPRATROPIUM BROMIDE AND ALBUTEROL SULFATE 3 ML: 2.5; .5 SOLUTION RESPIRATORY (INHALATION) at 10:27

## 2023-01-25 RX ADMIN — IPRATROPIUM BROMIDE AND ALBUTEROL SULFATE 3 ML: 2.5; .5 SOLUTION RESPIRATORY (INHALATION) at 01:57

## 2023-01-25 RX ADMIN — IPRATROPIUM BROMIDE AND ALBUTEROL SULFATE 3 ML: 2.5; .5 SOLUTION RESPIRATORY (INHALATION) at 06:35

## 2023-01-25 RX ADMIN — IPRATROPIUM BROMIDE AND ALBUTEROL SULFATE 3 ML: 2.5; .5 SOLUTION RESPIRATORY (INHALATION) at 14:30

## 2023-01-25 RX ADMIN — ONDANSETRON HYDROCHLORIDE 4 MG: 2 SOLUTION INTRAMUSCULAR; INTRAVENOUS at 16:14

## 2023-01-25 RX ADMIN — IPRATROPIUM BROMIDE AND ALBUTEROL SULFATE 3 ML: 2.5; .5 SOLUTION RESPIRATORY (INHALATION) at 22:25

## 2023-01-25 RX ADMIN — IPRATROPIUM BROMIDE AND ALBUTEROL SULFATE 3 ML: 2.5; .5 SOLUTION RESPIRATORY (INHALATION) at 19:28

## 2023-01-25 RX ADMIN — METHYLPREDNISOLONE SODIUM SUCCINATE 40 MG: 40 INJECTION, POWDER, FOR SOLUTION INTRAMUSCULAR; INTRAVENOUS at 05:30

## 2023-01-25 RX ADMIN — AZITHROMYCIN MONOHYDRATE 500 MG: 500 INJECTION, POWDER, LYOPHILIZED, FOR SOLUTION INTRAVENOUS at 06:14

## 2023-01-25 RX ADMIN — METHYLPREDNISOLONE SODIUM SUCCINATE 40 MG: 40 INJECTION, POWDER, FOR SOLUTION INTRAMUSCULAR; INTRAVENOUS at 14:08

## 2023-01-25 RX ADMIN — METHYLPREDNISOLONE SODIUM SUCCINATE 40 MG: 40 INJECTION, POWDER, FOR SOLUTION INTRAMUSCULAR; INTRAVENOUS at 23:08

## 2023-01-25 RX ADMIN — IPRATROPIUM BROMIDE AND ALBUTEROL SULFATE 3 ML: 2.5; .5 SOLUTION RESPIRATORY (INHALATION) at 18:45

## 2023-01-25 ASSESSMENT — PAIN DESCRIPTION - PAIN TYPE
TYPE: ACUTE PAIN

## 2023-01-25 ASSESSMENT — COPD QUESTIONNAIRES
DURING THE PAST 4 WEEKS HOW MUCH DID YOU FEEL SHORT OF BREATH: SOME OF THE TIME
DO YOU EVER COUGH UP ANY MUCUS OR PHLEGM?: NO/ONLY WITH OCCASIONAL COLDS OR INFECTIONS
HAVE YOU SMOKED AT LEAST 100 CIGARETTES IN YOUR ENTIRE LIFE: YES
COPD SCREENING SCORE: 5

## 2023-01-25 ASSESSMENT — ENCOUNTER SYMPTOMS
DIARRHEA: 0
VOMITING: 0
EYE REDNESS: 0
SHORTNESS OF BREATH: 1
BACK PAIN: 0
NERVOUS/ANXIOUS: 1
HEADACHES: 0
FOCAL WEAKNESS: 0
NAUSEA: 0
EYE DISCHARGE: 0
NECK PAIN: 0
COUGH: 0
FEVER: 0
FLANK PAIN: 0
DIZZINESS: 0
CHILLS: 1
ABDOMINAL PAIN: 0
SORE THROAT: 0
SENSORY CHANGE: 0
DEPRESSION: 0
BRUISES/BLEEDS EASILY: 0
SPEECH CHANGE: 0

## 2023-01-25 ASSESSMENT — FIBROSIS 4 INDEX: FIB4 SCORE: 3.95

## 2023-01-25 ASSESSMENT — LIFESTYLE VARIABLES: PACK_YEARS: 30

## 2023-01-25 NOTE — CARE PLAN
Problem: Knowledge Deficit - Standard  Goal: Patient and family/care givers will demonstrate understanding of plan of care, disease process/condition, diagnostic tests and medications  Outcome: Progressing     Problem: Knowledge Deficit - COPD  Goal: Patient/significant other demonstrates understanding of disease process, utilization of the Action Plan, medications and discharge instruction  Outcome: Progressing     Problem: Ineffective Airway Clearance  Goal: Patient will maintain patent airway with clear/clearing breath sounds  Outcome: Progressing     Problem: Impaired Gas Exchange  Goal: Patient will demonstrate improved ventilation and adequate oxygenation and participate in treatment regimen within the level of ability/situation.  Outcome: Progressing     Problem: Risk for Aspiration  Goal: Patient's risk for aspiration will be absent or decrease  Outcome: Progressing     Problem: Self Care  Goal: Patient will have the ability to perform ADLs independently or with assistance (bathe, groom, dress, toilet and feed)  Outcome: Progressing     Problem: Skin Integrity  Goal: Skin integrity is maintained or improved  Outcome: Progressing     Problem: Fall Risk  Goal: Patient will remain free from falls  Outcome: Progressing   The patient is Stable - Low risk of patient condition declining or worsening    Shift Goals  Clinical Goals: decrease O2 demands, pulm toileting, anxiety control  Patient Goals: sleep  Family Goals: joan    Progress made toward(s) clinical / shift goals:      Patient is not progressing towards the following goals:

## 2023-01-25 NOTE — HOSPITAL COURSE
Mr. Heath is a 57-year-old male with a past medical history significant for metastatic renal cell carcinoma with lung metastases, COPD, ongoing tobacco abuse, hypertension, and a history of nephrectomy who is been off chemotherapy for about 3 months due to side effects from neuro toxicities who was found with altered mental status in his trailer earlier today with a glucose of 27 and hypothermic at 35 °C.  He was brought to [facility] as the patient lives in [place] where he was given IV glucose with an improved glucose count to 200.  His mental status did improve after rewarming and repeating his glucose.  Unfortunately, the patient had significant respiratory distress and required BiPAP therapy.  The patient was given breathing treatments and antibiotics of azithromycin and ceftriaxone and then sent to [facility] as a direct admit for higher level of care and ongoing BiPAP management on 1/24.

## 2023-01-25 NOTE — RESPIRATORY CARE
"COPD EDUCATION by COPD CLINICAL EDUCATOR  (Phone: [phone])  1/25/2023 at 2:15 PM by Isabella Ferris RRT     Patient seen by Respiratory Education team to complete Block 1 of a 2 part series. Reference material about the program was given to patient along with our contact information.  Part #1 includes: What is COPD (how the lungs work), common treatments for COPD, the difference between \"rescue\" medications and \"daily\" medications, bronchial hygiene, and explanation of the Action Plan. We discussed appropriate medication technique along with a demonstration, and the correct way to care for and clean equipment.  Advance directives and smoking cessation were discussed as appropriate to this patient. Question and answer session followed. Patient lives 2 hours from [place] and it is difficult for him to travel to [place] for appointments. We will call [facility], [place], operated by [facility] to see if they are able to perform pulmonary function testing. If they are we can reach out to the patients doctor and recommend he get testing done.   Smoking Cessation Intervention and education completed, 10 minutes spent on smoking cessation education with patient.  Patient declined smoking cessation literature.       COPD Screen  COPD Risk Screening  Do you have a history of COPD?: Yes  Do you have a Pulmonologist?: No  COPD Population Screener  During the past 4 weeks, how much did you feel short of breath?: Some of the time  Do you ever cough up any mucus or phlegm?: No/only with occasional colds or infections  In the past 12 months, you do less than you used to because of your breathing problems: Agree  Have you smoked at least 100 cigarettes in your entire life?: Yes  How old are you?: 50-59  COPD Screening Score: 5  COPD Coordinator Recommended: Yes    COPD Assessment  COPD Clinical Specialists ONLY  COPD Education Initiated: Yes--Full Intervention (Given COPD booklet and spacer. Lives in [place], will check on PFT's at [facility].)  COPD Education " "Session 1: Yes  DME Company: has fathers old equipment  DME Equipment Type: O2 concentrator, POC  Physician Follow Up Appointment:  (declined appt assistance, mother makes appt's)  Physician Name: Michael Sultana M.D.  Pulmonologist Name: declined follow up with Pulmonary unless we can arrange for virtual appt (difficultly traveling to [place])  Palliative Care: Yes (referral placed 1/24/2023, has not been seen)  Durable Power of : No  Advance Directive: No  Discussion with others: Yes  Referrals Initiated: Yes  Pulmonary Rehab: Yes  Smoking Cessation: Yes  $ Smoking Cessation 3-10 Minutes: Symptomatic (10 min, declined literature)  Smoking Pack Years: 30  Hospice: N/A  Home Health Care:  (TBD)  [facility] Outreach: N/A  [facility]: N/A  [facility]: N/A  Private In-Home Care Agency: N/A  Is this a COPD exacerbation patient?: Yes (per MD notes, order set used, under critical care)  $ Demo/Eval of SVN's, MDI's and Aerosols: Yes (spacer)  (OP) Pulmonary Function Testing:  (will call to see if PFT can be done at [facility] in [place])    PFT Results    No results found for: PFT    Meds to Beds  Would the patient like to opt in for Bedside Medication Delivery at Discharge?: Yes, interested     MY COPD ACTION PLAN     It is recommended that patients and physicians /healthcare providers complete this action plan together. This plan should be discussed at each physician visit and updated as needed.    The green, yellow and red zones show groups of symptoms of COPD. This list of symptoms is not comprehensive, and you may experience other symptoms. In the \"Actions\" column, your healthcare provider has recommended actions for you to take based on your symptoms.    Patient Name: Zachery Heath   YOB: 1965   Last Updated on:     Green Zone:  I am doing well today Actions     Usual activitiy and exercise level   Take daily medications     Usual amounts of cough and phlegm/mucus   Use " "oxygen as prescribed     Sleep well at night   Continue regular exercise/diet plan     Appetite is good   At all times avoid cigarette smoke, inhaled irritants     Daily Medications (these medications are taken every day):                Yellow Zone:  I am having a bad day or a COPD flare Actions     More breathless than usual   Continue daily medications     I have less energy for my daily activities   Use quick relief inhaler as ordered     Increased or thicker phlegm/mucus   Use oxygen as prescribed     Using quick relief inhaler/nebulizer more often   Get plenty of rest     Swelling of ankles more than usual   Use pursed lip breathing     More coughing than usual   At all times avoid cigarette smoke, inhaled irritants     I feel like I have a \"chest cold\"     Poor sleep and my symptoms woke me up     My appetite is not good     My medicine is not helping      Call provider immediately if symptoms don’t improve     Continue daily medications, add rescue medications:   Levalbuterol (Xopenex) 2 Puffs         Medications to be used during a flare up, (as Discussed with Provider):           Additional Information:  Use the spacer with your rescue inhaler, take as directed.     Red Zone:  I need urgent medical care Actions     Severe shortness of breath even at rest   Call 911 or seek medical care immediately     Not able to do any activity because of breathing      Fever or shaking chills      Feeling confused or very drowsy       Chest pains      Coughing up blood                  "

## 2023-01-25 NOTE — PROGRESS NOTES
4 Eyes Skin Assessment Completed by TYRESE Jones and TYRESE Rivero.    Head WDL  Ears WDL  Nose WDL  Mouth WDL  Neck WDL  Breast/Chest WDL  Shoulder Blades WDL  Spine WDL  (R) Arm/Elbow/Hand skin tear  (L) Arm/Elbow/Hand WDL  Abdomen WDL  Groin WDL  Scrotum/Coccyx/Buttocks Redness and Blanching  (R) Leg Redness, Blanching, and Abrasion  (L) Leg Redness, Blanching, and Abrasion  (R) Heel/Foot/Toe Redness, Blanching, and Boggy  (L) Heel/Foot/Toe Redness, Blanching, and Boggy          Devices In Places ECG, Blood Pressure Cuff, Pulse Ox, SCD's, and HFNC      Interventions In Place Bipap Protecta-Gel, Heel Mepilex, Sacral Mepilex, Pillows, and Low Air Loss Mattress, hip mepilex    Possible Skin Injury Yes    Pictures Uploaded Into Epic Yes  Wound Consult Placed Yes  RN Wound Prevention Protocol Ordered No

## 2023-01-25 NOTE — DIETARY
"Nutrition services: Day 1 of admit.  Zachery Heath is a 57 y.o. male with admitting DX of Respiratory failure.  Consult received for BMI <19.    Assessment:  Height: 172.7 cm (5' 8\")  Weight: 54.2 kg (119 lb 7.8 oz)  Body mass index is 18.17 kg/m²., BMI classification: Underweight  Diet/Intake: Regular    Evaluation:   Pt with history of renal cell cancer with lung metastases.  Pt states he lost weight to 88 lb but has been regaining weight. He states he eats well when on his prednisone. Appetite currently poor. He states he is trying to eat as much as he can. He does drink vanilla boost at home and agreed to this with all meals.  Observed moderate muscle loss at temples.    Malnutrition Risk: Pt likely with recent malnutrition, but he reports good PO intake and recent weight gain.    Recommendations/Plan:  Boost Plus with meals.   Encourage intake of meals and supplements >50%.  Document intake of all meals and supplements as % taken in ADL's to provide interdisciplinary communication across all shifts.   Monitor weight.  Nutrition rep will continue to see patient for ongoing meal and snack preferences.     RD following    "

## 2023-01-25 NOTE — CONSULTS
"Reason for PC Consult:  ACP    Consulted by: Dr. Danielle    Assessment:  General: Zachery is a 57 y.o. male with a past medical history of metastatic renal cell carcinoma with lung mets, COPD, Tobacco, HTN, nephrectomy who ws found altered in his trailer with a blood glucose of 27, and hypothermic. He was taken to [facility] and required BiPAP for respiratory distress and transferred to [facility]. Pt now on HFNC.     Social: Zachery lives alone in his trailer in [place] on the same property a few feet away from his mother, Aury. His brother, Luis Carlos, also lives in [place] and helps drive Zachery to his appointments. Zachery has two children, Anita (lives in [place]) and Saravanan (lives in [place]), and 3 grandchildren. He enjoys cooking and spending time with his cat, Bud. He worked as a wielder in the past and states that he has traveled to "every state except [place] and [place].      Consults: none    Dyspnea: no  Last BM:  PTA  Pain: no  Depression: mood appropriate for situation  Dementia: no    Spiritual:  Is Yazdanism or spirituality important for coping with this illness?  Yes  Has a  or spiritual provider visit been requested?  no    Palliative Performance Scale:  60%    Advance Directive:  PC RN queried if pt has ever discussed healthcare wishes and values or completed an Advance Directive in the past. Pt states that he has not. PC RN provided education on importance of completing an Advance Directive. He agreed and PC RN reviewed the Advanced Directive with him. Pt states he would like to go through it with his mom and brother when he discharges. He shared that he knows a notary in [place]. PC RN left AD at bedside.    DPOA: No  POLST: No    Code Status: Full. Addressed at this encounter with pt. Described the measures employed in a full code (including CPR, medications, and possibly defibrillation) and survival statistics.  Further explained that a DNR would not preclude any " "treatments/interventions offered to pt. Expressed concern that due to patient's acute situation, progressive disease, age, and co-morbidities, he is unlikely to endure invasive code measures well, and even if he was successfully resuscitated, he may come out of it with significantly reduced quality of life from even his current state.  Pt verbalized understanding; however, he wishes to remain Full Code at this time. He shared that he saw his father be successfully resuscitated and he also would want, \"every chance I've got.\"     Outcome:  0940: Consult received and EMR reviewed; case discussed with Dr. Danielle, updates appreciated.     0945: PC RN to pt's bedside. Introduced self and role of Palliative Care.  He declined wanting anyone else present for consultation. Assessed pt's understanding of their current medical status, overall health picture, and options for future care. Pt expressed good understanding of acute situation and chronic issues. He shared that he felt the best during his cancer treatment when he was receiving \"infusion and prednisone.\" He shred that ever since his prednisone has been tapered he has no appetitive or energy. He discussed that he stopped cancer treatment a few months ago because he had seizures. He shared that prior to calling 911 he felt confused and was having a difficult time breathing despite using his inhalers. He understands that he had hypoglycemia and a COPD exacerbation.     Explored pt's values, beliefs, and preferences in order to identify GOC. Pt's sources of strength are that he has family support and he want's to be able to enjoy cooking. Pt's greatest concerns are what next steps are for his cancer treatment and what treatments will he be able to best tolerate. He shared, \"everything else is up to God.\" Ultimately he is hoping to be able to recover and return home with help of his mother and brother.     General discussion of cancer care, including additional " "diagnostics, possible interventions, balancing burden vs benefit, consideration of quality of life issues, and encouraging open conversations between loved ones and with IDT. Discussed smoking cessation and provided education about resources/tools available. 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 However, pt verbalized understanding that smoking will further exacerbate his COPD and lung cancer.     Introduced the philosophy of hospice. PC RN educated that hospice is a group of caregivers who provide end of life care, focused on remaining quality of life rather than quantity of life. Discussed that the focus switches from disease modifying treatment to management of symptoms such as dyspnea, anxiety, nausea, pain, etc. I counseled that they provide EOL care needs and are available 24/7, though they do not provide 24/7 direct care. Zachery shared, \" When I do a deep search in my heart and in my head, I just don't feel end of life.\" And further that, \"but I understand that might be helpful at some point.\"     Zachery shared that he is concerned that he was supposed to have a follow up appointment with his oncologist tomorrow and have a repeat CT scan prior to his appointment. He asked if his imagining could be completed while here. PC RN offered to discuss with the IDT team. Zachery was agreeable.     Active listening, reflection, reminiscing, validation & normalization, empathic support and therapeutic touch utilized throughout this encounter.  All questions answered.  PC contact information given. Acknowledged pt's and family's courage in having this discussion. They expressed appreciation for support.    Updated: Dr. Danielle     Plan: Palliative care to " continue to follow, provide support, and help facilitate decision-making as clinical picture evolves.    Thank you for allowing Palliative Care to participate in this patient's care. Please feel free to call [phone] with any questions or concerns.

## 2023-01-25 NOTE — PROGRESS NOTES
Critical Care Progress Note    Date of admission  1/24/2023    Chief Complaint  57 y.o. male admitted 1/24/2023 with acute hypoxic respiratory failure requiring BiPAP therapy    Hospital Course  Mr. Heath is a 57-year-old male with a past medical history significant for metastatic renal cell carcinoma with lung metastases, COPD, ongoing tobacco abuse, hypertension, and a history of nephrectomy who is been off chemotherapy for about 3 months due to side effects from neuro toxicities who was found with altered mental status in his trailer earlier today with a glucose of 27 and hypothermic at 35 °C.  He was brought to [facility] as the patient lives in [place] where he was given IV glucose with an improved glucose count to 200.  His mental status did improve after rewarming and repeating his glucose.  Unfortunately, the patient had significant respiratory distress and required BiPAP therapy.  The patient was given breathing treatments and antibiotics of azithromycin and ceftriaxone and then sent to [facility] as a direct admit for higher level of care and ongoing BiPAP management on 1/24.    Interval Problem Update  Reviewed last 24 hour events:   - transitioned to HFNC   - no events overnight   - a/ox4   - SR 60-90s   - -110s   - afebrile   - regular diet, low appetite   - adequate UOP   - BM pta   - HFNC 40/50%   - Duonebs   - lovenox   - Azithromycin day 2/3   - electrolytes ok   - scheduled steroids    Review of Systems  Review of Systems   Constitutional:  Positive for chills and malaise/fatigue. Negative for fever.   HENT:  Negative for congestion and sore throat.    Eyes:  Negative for discharge and redness.   Respiratory:  Positive for shortness of breath. Negative for cough.    Cardiovascular:  Negative for chest pain and leg swelling.   Gastrointestinal:  Negative for abdominal pain, diarrhea, nausea and vomiting.   Genitourinary:  Negative for flank pain and  hematuria.   Musculoskeletal:  Negative for back pain and neck pain.   Skin:  Negative for rash.   Neurological:  Negative for dizziness, sensory change, speech change, focal weakness and headaches.   Endo/Heme/Allergies:  Does not bruise/bleed easily.   Psychiatric/Behavioral:  Negative for depression. The patient is nervous/anxious.       Vital Signs for last 24 hours   Temp:  [36.4 °C (97.5 °F)-37.2 °C (99 °F)] 36.6 °C (97.9 °F)  Pulse:  [] 74  Resp:  [12-33] 19  BP: ()/(64-74) 103/70  SpO2:  [97 %-100 %] 97 %    Hemodynamic parameters for last 24 hours       Respiratory Information for the last 24 hours       Physical Exam   Physical Exam  Vitals and nursing note reviewed.   Constitutional:       General: He is not in acute distress.     Appearance: He is ill-appearing. He is not toxic-appearing.   HENT:      Head: Normocephalic and atraumatic.      Right Ear: External ear normal.      Left Ear: External ear normal.      Nose: Nose normal. No rhinorrhea.      Mouth/Throat:      Mouth: Mucous membranes are moist.      Pharynx: Oropharynx is clear. No oropharyngeal exudate.      Comments: HFNC in place  Eyes:      General: No scleral icterus.     Conjunctiva/sclera: Conjunctivae normal.      Pupils: Pupils are equal, round, and reactive to light.   Cardiovascular:      Rate and Rhythm: Normal rate and regular rhythm.      Pulses:           Radial pulses are 2+ on the right side and 2+ on the left side.        Dorsalis pedis pulses are 1+ on the right side and 1+ on the left side.      Heart sounds: Heart sounds are distant. No murmur heard.  Pulmonary:      Breath sounds: No wheezing.      Comments: Breathing comfortably on HFNC, diminished throughout  Chest:      Chest wall: No tenderness.   Abdominal:      Palpations: Abdomen is soft. There is no mass.      Tenderness: There is no guarding.   Musculoskeletal:         General: Normal range of motion.      Cervical back: Normal range of motion and neck  supple.      Right lower leg: No edema.      Left lower leg: No edema.   Lymphadenopathy:      Cervical: No cervical adenopathy.   Skin:     General: Skin is warm and dry.      Capillary Refill: Capillary refill takes less than 2 seconds.      Findings: No rash.   Neurological:      Mental Status: He is alert and oriented to person, place, and time.      Cranial Nerves: No cranial nerve deficit.      Sensory: No sensory deficit.      Motor: No weakness.   Psychiatric:         Attention and Perception: Attention normal.         Mood and Affect: Mood is anxious.         Speech: Speech normal.         Behavior: Behavior normal. Behavior is cooperative.         Thought Content: Thought content normal.         Cognition and Memory: Cognition normal.       Medications  Current Facility-Administered Medications   Medication Dose Route Frequency Provider Last Rate Last Admin    senna-docusate (PERICOLACE or SENOKOT S) 8.6-50 MG per tablet 2 Tablet  2 Tablet Oral BID Nina Danielle M.D.        And    polyethylene glycol/lytes (MIRALAX) PACKET 1 Packet  1 Packet Oral QDAY PRN Nina Danielle M.D.        And    magnesium hydroxide (MILK OF MAGNESIA) suspension 30 mL  30 mL Oral QDAY PRN Nina Danielle M.D.        And    bisacodyl (DULCOLAX) suppository 10 mg  10 mg Rectal QDAY PRN Nina Danielle M.D.        Respiratory Therapy Consult   Nebulization Continuous RT Nina Danielle M.D.        MD Alert...ICU Electrolyte Replacement per Pharmacy   Other PHARMACY TO DOSE Nina Danielle M.D.        enoxaparin (Lovenox) inj 40 mg  40 mg Subcutaneous DAILY AT 1800 Nina Danielle M.D.   40 mg at 01/24/23 1717    acetaminophen (Tylenol) tablet 650 mg  650 mg Oral Q6HRS PRN Nina Danielle M.D.        ondansetron (ZOFRAN) syringe/vial injection 4 mg  4 mg Intravenous Q4HRS PRN Nina Danielle M.D.        ondansetron (ZOFRAN ODT) dispertab 4 mg  4 mg Oral Q4HRS PRN Nina Danielle M.D.        promethazine  (PHENERGAN) tablet 12.5-25 mg  12.5-25 mg Oral Q4HRS PRN Nina Danielle M.D.        promethazine (PHENERGAN) suppository 12.5-25 mg  12.5-25 mg Rectal Q4HRS PRN Nina Danielle M.D.        prochlorperazine (COMPAZINE) injection 5-10 mg  5-10 mg Intravenous Q4HRS PRN Nina Danielle M.D.        Respiratory Therapy Consult   Nebulization Continuous RT Nina Danielle M.D.        ipratropium-albuterol (DUONEB) nebulizer solution  3 mL Nebulization Q4HRS (RT) Nina Danielle M.D.   3 mL at 01/25/23 0635    ipratropium-albuterol (DUONEB) nebulizer solution  3 mL Nebulization Q2HRS PRN (RT) Nina Danielle M.D.        methylPREDNISolone (SOLU-MEDROL) 40 MG injection 40 mg  40 mg Intravenous Q8HRS Nina Danielle M.D.   40 mg at 01/25/23 0530    azithromycin (ZITHROMAX) 500 mg in D5W 250 mL IVPB premix  500 mg Intravenous Q24HRS Nina Danielle M.D. 250 mL/hr at 01/25/23 0614 500 mg at 01/25/23 0614    ALPRAZolam (XANAX) tablet 0.25 mg  0.25 mg Oral 4X/DAY PRN Nina Danielle M.D.   0.25 mg at 01/24/23 1611    D5LR infusion   Intravenous Continuous Nina Danielle M.D. 83 mL/hr at 01/24/23 1917 New Bag at 01/24/23 1917    Nozin nasal  swab  1 Applicator Each Nostril BID Nina Danielle M.D.   1 Applicator at 01/25/23 0530       Fluids    Intake/Output Summary (Last 24 hours) at 1/25/2023 0659  Last data filed at 1/25/2023 0600  Gross per 24 hour   Intake 2782.03 ml   Output 500 ml   Net 2282.03 ml       Laboratory          Recent Labs     01/24/23  0727 01/25/23  0535   SODIUM 140 135   POTASSIUM 3.4* 4.6   CHLORIDE 103 104   CO2 14* 21   BUN 10 10   CREATININE 0.75 0.56   MAGNESIUM 1.5 2.0   PHOSPHORUS 5.5* 3.7   CALCIUM 8.3* 8.0*     Recent Labs     01/24/23 0727 01/25/23  0535   ALTSGPT 12  --    ASTSGOT 46*  --    ALKPHOSPHAT 194*  --    TBILIRUBIN 0.6  --    GLUCOSE 61* 173*     Recent Labs     01/24/23  0727   WBC 3.7*   NEUTSPOLYS 95.70*   LYMPHOCYTES 3.40*   MONOCYTES 0.90    EOSINOPHILS 0.00   BASOPHILS 0.00   ASTSGOT 46*   ALTSGPT 12   ALKPHOSPHAT 194*   TBILIRUBIN 0.6     Recent Labs     01/24/23  0727   RBC 3.04*   HEMOGLOBIN 11.8*   HEMATOCRIT 34.6*   PLATELETCT 195       Imaging  ECHO:  No prior study is available for comparison.   The left ventricular ejection fraction is visually estimated to be 65%.  Unable to estimate right ventricular systolic pressure due to an   inadequate tricuspid regurgitant jet    Assessment/Plan  * Acute respiratory failure with hypoxia (HCC)- (present on admission)  Assessment & Plan  Likely due to COPD exacerbation  Transitioned of BiPAP to HFNC at 40-60/60%, wean FiO2 as tolerated  RT/O2 protocols  Diuresis as needed  Low threshold to intubate    Macrocytic anemia- (present on admission)  Assessment & Plan  No acute blood loss noted  Hb goals > 7    Hypomagnesemia- (present on admission)  Assessment & Plan  Replete to goal > 2    Hypokalemia- (present on admission)  Assessment & Plan  Replete to goals > 4    Hypoglycemia- (present on admission)  Assessment & Plan  ?due to metastatic disease  D5LR at 83cc/hr  Improved    Acute exacerbation of chronic obstructive pulmonary disease (COPD) (HCC)  Assessment & Plan  RT/O2 protocols  HFNC  O2 sat goals > 88%  Scheduled steroids  Scheduled and PRN bronchodilators  Azithromycin for 3 days       Renal carcinoma (HCC)- (present on admission)  Assessment & Plan  With metastatic disease to lungs  ?disease to liver or bones  Off chemotherapy for 3 months due to neuro side effects  Palliative care consulted for advance care planning-->FULL CODE         VTE:  Lovenox  Ulcer: Not Indicated  Lines:  PIVs    I have performed a physical exam and reviewed and updated ROS and Plan today (1/25/2023). In review of yesterday's note (1/24/2023), there are no changes except as documented above.     Discussed patient condition and risk of morbidity and/or mortality with RN, RT, Therapies, Pharmacy, Code status disscussed,  and Patient    The patient remains critically ill requiring active titration of high flow nasal cannula for acute hypoxic respiratory failure likely due to acute COPD exacerbation.  I have assessed and reassessed the patient's respiratory status, hemodynamics, and mental status.  Patient remains at high risk of clinical deterioration, worsening vital organ dysfunction, and death without the above critical care interventions.    Critical care time = 44 minutes in directly providing and coordinating critical care and extensive data review.  No time overlap and excludes procedures.

## 2023-01-25 NOTE — CARE PLAN
The patient is Stable - Low risk of patient condition declining or worsening    Shift Goals  Clinical Goals: decrease O2 demands, pulm toileting, increase mobility  Patient Goals: sleep  Family Goals: joan      Problem: Knowledge Deficit - Standard  Goal: Patient and family/care givers will demonstrate understanding of plan of care, disease process/condition, diagnostic tests and medications  Outcome: Progressing     Problem: Knowledge Deficit - COPD  Goal: Patient/significant other demonstrates understanding of disease process, utilization of the Action Plan, medications and discharge instruction  Outcome: Progressing     Problem: Risk for Infection - COPD  Goal: Patient will remain free from signs and symptoms of infection  Outcome: Progressing     Problem: Nutrition - Advanced  Goal: Patient will display progressive weight gain toward goal have adequate food and fluid intake  Outcome: Progressing     Problem: Ineffective Airway Clearance  Goal: Patient will maintain patent airway with clear/clearing breath sounds  Outcome: Progressing     Problem: Impaired Gas Exchange  Goal: Patient will demonstrate improved ventilation and adequate oxygenation and participate in treatment regimen within the level of ability/situation.  Outcome: Progressing     Problem: Self Care  Goal: Patient will have the ability to perform ADLs independently or with assistance (bathe, groom, dress, toilet and feed)  Outcome: Progressing     Problem: Skin Integrity  Goal: Skin integrity is maintained or improved  Outcome: Progressing     Problem: Fall Risk  Goal: Patient will remain free from falls  Outcome: Progressing

## 2023-01-26 PROBLEM — T38.0X5A ADRENAL INSUFFICIENCY DUE TO STEROID WITHDRAWAL (HCC): Status: ACTIVE | Noted: 2023-01-26

## 2023-01-26 PROBLEM — E43 PROTEIN-CALORIE MALNUTRITION, SEVERE (HCC): Status: ACTIVE | Noted: 2023-01-26

## 2023-01-26 PROBLEM — E27.3 ADRENAL INSUFFICIENCY DUE TO STEROID WITHDRAWAL (HCC): Status: ACTIVE | Noted: 2023-01-26

## 2023-01-26 LAB
ANION GAP SERPL CALC-SCNC: 11 MMOL/L (ref 7–16)
BASOPHILS # BLD AUTO: 0 % (ref 0–1.8)
BASOPHILS # BLD: 0 K/UL (ref 0–0.12)
BUN SERPL-MCNC: 7 MG/DL (ref 8–22)
CALCIUM SERPL-MCNC: 8.4 MG/DL (ref 8.5–10.5)
CHLORIDE SERPL-SCNC: 99 MMOL/L (ref 96–112)
CO2 SERPL-SCNC: 24 MMOL/L (ref 20–33)
CREAT SERPL-MCNC: 0.54 MG/DL (ref 0.5–1.4)
EOSINOPHIL # BLD AUTO: 0 K/UL (ref 0–0.51)
EOSINOPHIL NFR BLD: 0 % (ref 0–6.9)
ERYTHROCYTE [DISTWIDTH] IN BLOOD BY AUTOMATED COUNT: 80.1 FL (ref 35.9–50)
GFR SERPLBLD CREATININE-BSD FMLA CKD-EPI: 115 ML/MIN/1.73 M 2
GLUCOSE SERPL-MCNC: 158 MG/DL (ref 65–99)
HCT VFR BLD AUTO: 33.2 % (ref 42–52)
HGB BLD-MCNC: 11.2 G/DL (ref 14–18)
IMM GRANULOCYTES # BLD AUTO: 0.11 K/UL (ref 0–0.11)
IMM GRANULOCYTES NFR BLD AUTO: 1.5 % (ref 0–0.9)
LYMPHOCYTES # BLD AUTO: 0.13 K/UL (ref 1–4.8)
LYMPHOCYTES NFR BLD: 1.8 % (ref 22–41)
MAGNESIUM SERPL-MCNC: 1.8 MG/DL (ref 1.5–2.5)
MCH RBC QN AUTO: 39 PG (ref 27–33)
MCHC RBC AUTO-ENTMCNC: 33.7 G/DL (ref 33.7–35.3)
MCV RBC AUTO: 115.7 FL (ref 81.4–97.8)
MONOCYTES # BLD AUTO: 0.14 K/UL (ref 0–0.85)
MONOCYTES NFR BLD AUTO: 1.9 % (ref 0–13.4)
NEUTROPHILS # BLD AUTO: 6.99 K/UL (ref 1.82–7.42)
NEUTROPHILS NFR BLD: 94.8 % (ref 44–72)
NRBC # BLD AUTO: 0 K/UL
NRBC BLD-RTO: 0 /100 WBC
PHOSPHATE SERPL-MCNC: 2.8 MG/DL (ref 2.5–4.5)
PLATELET # BLD AUTO: 202 K/UL (ref 164–446)
PMV BLD AUTO: 8.7 FL (ref 9–12.9)
POTASSIUM SERPL-SCNC: 4.4 MMOL/L (ref 3.6–5.5)
RBC # BLD AUTO: 2.87 M/UL (ref 4.7–6.1)
SODIUM SERPL-SCNC: 134 MMOL/L (ref 135–145)
WBC # BLD AUTO: 7.4 K/UL (ref 4.8–10.8)

## 2023-01-26 PROCEDURE — 80048 BASIC METABOLIC PNL TOTAL CA: CPT

## 2023-01-26 PROCEDURE — 700102 HCHG RX REV CODE 250 W/ 637 OVERRIDE(OP): Performed by: HOSPITALIST

## 2023-01-26 PROCEDURE — 700102 HCHG RX REV CODE 250 W/ 637 OVERRIDE(OP): Performed by: INTERNAL MEDICINE

## 2023-01-26 PROCEDURE — 94664 DEMO&/EVAL PT USE INHALER: CPT

## 2023-01-26 PROCEDURE — 84100 ASSAY OF PHOSPHORUS: CPT

## 2023-01-26 PROCEDURE — 99233 SBSQ HOSP IP/OBS HIGH 50: CPT | Performed by: INTERNAL MEDICINE

## 2023-01-26 PROCEDURE — 700111 HCHG RX REV CODE 636 W/ 250 OVERRIDE (IP): Performed by: HOSPITALIST

## 2023-01-26 PROCEDURE — 94760 N-INVAS EAR/PLS OXIMETRY 1: CPT

## 2023-01-26 PROCEDURE — A9270 NON-COVERED ITEM OR SERVICE: HCPCS | Performed by: HOSPITALIST

## 2023-01-26 PROCEDURE — 83735 ASSAY OF MAGNESIUM: CPT

## 2023-01-26 PROCEDURE — A9270 NON-COVERED ITEM OR SERVICE: HCPCS | Performed by: INTERNAL MEDICINE

## 2023-01-26 PROCEDURE — 94640 AIRWAY INHALATION TREATMENT: CPT

## 2023-01-26 PROCEDURE — 700105 HCHG RX REV CODE 258: Performed by: INTERNAL MEDICINE

## 2023-01-26 PROCEDURE — 99255 IP/OBS CONSLTJ NEW/EST HI 80: CPT | Performed by: HOSPITALIST

## 2023-01-26 PROCEDURE — 94669 MECHANICAL CHEST WALL OSCILL: CPT

## 2023-01-26 PROCEDURE — 770020 HCHG ROOM/CARE - TELE (206)

## 2023-01-26 PROCEDURE — 700101 HCHG RX REV CODE 250: Performed by: INTERNAL MEDICINE

## 2023-01-26 PROCEDURE — 85025 COMPLETE CBC W/AUTO DIFF WBC: CPT

## 2023-01-26 PROCEDURE — 700111 HCHG RX REV CODE 636 W/ 250 OVERRIDE (IP): Performed by: INTERNAL MEDICINE

## 2023-01-26 RX ORDER — MAGNESIUM SULFATE HEPTAHYDRATE 40 MG/ML
2 INJECTION, SOLUTION INTRAVENOUS ONCE
Status: COMPLETED | OUTPATIENT
Start: 2023-01-26 | End: 2023-01-26

## 2023-01-26 RX ORDER — IPRATROPIUM BROMIDE AND ALBUTEROL SULFATE 2.5; .5 MG/3ML; MG/3ML
3 SOLUTION RESPIRATORY (INHALATION)
Status: DISCONTINUED | OUTPATIENT
Start: 2023-01-26 | End: 2023-01-27 | Stop reason: HOSPADM

## 2023-01-26 RX ORDER — PREDNISONE 20 MG/1
40 TABLET ORAL DAILY
Status: DISCONTINUED | OUTPATIENT
Start: 2023-01-26 | End: 2023-01-27 | Stop reason: HOSPADM

## 2023-01-26 RX ADMIN — AZITHROMYCIN MONOHYDRATE 500 MG: 250 TABLET ORAL at 06:10

## 2023-01-26 RX ADMIN — IPRATROPIUM BROMIDE AND ALBUTEROL SULFATE 3 ML: 2.5; .5 SOLUTION RESPIRATORY (INHALATION) at 02:08

## 2023-01-26 RX ADMIN — Medication 1 APPLICATOR: at 06:10

## 2023-01-26 RX ADMIN — PREDNISONE 40 MG: 20 TABLET ORAL at 12:42

## 2023-01-26 RX ADMIN — SODIUM CHLORIDE, SODIUM LACTATE, POTASSIUM CHLORIDE, CALCIUM CHLORIDE AND DEXTROSE MONOHYDRATE: 5; 600; 310; 30; 20 INJECTION, SOLUTION INTRAVENOUS at 07:26

## 2023-01-26 RX ADMIN — METHYLPREDNISOLONE SODIUM SUCCINATE 40 MG: 40 INJECTION, POWDER, FOR SOLUTION INTRAMUSCULAR; INTRAVENOUS at 06:10

## 2023-01-26 RX ADMIN — MAGNESIUM SULFATE HEPTAHYDRATE 2 G: 40 INJECTION, SOLUTION INTRAVENOUS at 10:15

## 2023-01-26 RX ADMIN — IPRATROPIUM BROMIDE AND ALBUTEROL SULFATE 3 ML: 2.5; .5 SOLUTION RESPIRATORY (INHALATION) at 06:14

## 2023-01-26 RX ADMIN — IPRATROPIUM BROMIDE AND ALBUTEROL SULFATE 3 ML: 2.5; .5 SOLUTION RESPIRATORY (INHALATION) at 14:51

## 2023-01-26 RX ADMIN — RIVAROXABAN 10 MG: 10 TABLET, FILM COATED ORAL at 17:38

## 2023-01-26 RX ADMIN — IPRATROPIUM BROMIDE AND ALBUTEROL SULFATE 3 ML: 2.5; .5 SOLUTION RESPIRATORY (INHALATION) at 18:20

## 2023-01-26 ASSESSMENT — LIFESTYLE VARIABLES
PACK_YEARS: 30
EVER FELT BAD OR GUILTY ABOUT YOUR DRINKING: NO
HAVE YOU EVER FELT YOU SHOULD CUT DOWN ON YOUR DRINKING: NO
HAVE PEOPLE ANNOYED YOU BY CRITICIZING YOUR DRINKING: NO
HOW MANY TIMES IN THE PAST YEAR HAVE YOU HAD 5 OR MORE DRINKS IN A DAY: 0
TOTAL SCORE: 0
DOES PATIENT WANT TO STOP DRINKING: NO
TOTAL SCORE: 0
ON A TYPICAL DAY WHEN YOU DRINK ALCOHOL HOW MANY DRINKS DO YOU HAVE: 1
CONSUMPTION TOTAL: NEGATIVE
AVERAGE NUMBER OF DAYS PER WEEK YOU HAVE A DRINK CONTAINING ALCOHOL: 3
ALCOHOL_USE: YES
EVER HAD A DRINK FIRST THING IN THE MORNING TO STEADY YOUR NERVES TO GET RID OF A HANGOVER: NO
TOTAL SCORE: 0

## 2023-01-26 ASSESSMENT — COGNITIVE AND FUNCTIONAL STATUS - GENERAL
SUGGESTED CMS G CODE MODIFIER DAILY ACTIVITY: CI
SUGGESTED CMS G CODE MODIFIER DAILY ACTIVITY: CI
WALKING IN HOSPITAL ROOM: A LITTLE
MOBILITY SCORE: 24
DAILY ACTIVITIY SCORE: 23
CLIMB 3 TO 5 STEPS WITH RAILING: A LITTLE
SUGGESTED CMS G CODE MODIFIER MOBILITY: CJ
STANDING UP FROM CHAIR USING ARMS: A LITTLE
MOBILITY SCORE: 21
SUGGESTED CMS G CODE MODIFIER MOBILITY: CH
HELP NEEDED FOR BATHING: A LITTLE
DRESSING REGULAR LOWER BODY CLOTHING: A LITTLE
DAILY ACTIVITIY SCORE: 23

## 2023-01-26 ASSESSMENT — FIBROSIS 4 INDEX
FIB4 SCORE: 3.81
FIB4 SCORE: 3.81

## 2023-01-26 ASSESSMENT — ENCOUNTER SYMPTOMS
EYE REDNESS: 0
BRUISES/BLEEDS EASILY: 0
NAUSEA: 0
DEPRESSION: 0
VOMITING: 0
NERVOUS/ANXIOUS: 1
NECK PAIN: 0
FOCAL WEAKNESS: 0
SORE THROAT: 0
WEIGHT LOSS: 1
HEADACHES: 0
COUGH: 1
FLANK PAIN: 0
SPUTUM PRODUCTION: 0
EYE DISCHARGE: 0
WHEEZING: 0
SPEECH CHANGE: 0
SENSORY CHANGE: 0
SHORTNESS OF BREATH: 1
FEVER: 0
HEMOPTYSIS: 0
COUGH: 0
CHILLS: 1
DIZZINESS: 0
ABDOMINAL PAIN: 0
DIARRHEA: 0
BACK PAIN: 0

## 2023-01-26 ASSESSMENT — PATIENT HEALTH QUESTIONNAIRE - PHQ9
7. TROUBLE CONCENTRATING ON THINGS, SUCH AS READING THE NEWSPAPER OR WATCHING TELEVISION: NOT AT ALL
9. THOUGHTS THAT YOU WOULD BE BETTER OFF DEAD, OR OF HURTING YOURSELF: NOT AT ALL
5. POOR APPETITE OR OVEREATING: SEVERAL DAYS
4. FEELING TIRED OR HAVING LITTLE ENERGY: SEVERAL DAYS
SUM OF ALL RESPONSES TO PHQ9 QUESTIONS 1 AND 2: 1
3. TROUBLE FALLING OR STAYING ASLEEP OR SLEEPING TOO MUCH: SEVERAL DAYS
2. FEELING DOWN, DEPRESSED, IRRITABLE, OR HOPELESS: NOT AT ALL
1. LITTLE INTEREST OR PLEASURE IN DOING THINGS: SEVERAL DAYS
6. FEELING BAD ABOUT YOURSELF - OR THAT YOU ARE A FAILURE OR HAVE LET YOURSELF OR YOUR FAMILY DOWN: NOT AL ALL
8. MOVING OR SPEAKING SO SLOWLY THAT OTHER PEOPLE COULD HAVE NOTICED. OR THE OPPOSITE, BEING SO FIGETY OR RESTLESS THAT YOU HAVE BEEN MOVING AROUND A LOT MORE THAN USUAL: NOT AT ALL
SUM OF ALL RESPONSES TO PHQ QUESTIONS 1-9: 4

## 2023-01-26 ASSESSMENT — COPD QUESTIONNAIRES
HAVE YOU SMOKED AT LEAST 100 CIGARETTES IN YOUR ENTIRE LIFE: YES
DURING THE PAST 4 WEEKS HOW MUCH DID YOU FEEL SHORT OF BREATH: SOME OF THE TIME
DO YOU EVER COUGH UP ANY MUCUS OR PHLEGM?: NO/ONLY WITH OCCASIONAL COLDS OR INFECTIONS
COPD SCREENING SCORE: 5

## 2023-01-26 ASSESSMENT — PAIN DESCRIPTION - PAIN TYPE
TYPE: ACUTE PAIN

## 2023-01-26 NOTE — CARE PLAN
The patient is Stable - Low risk of patient condition declining or worsening    Shift Goals  Clinical Goals: wean O2, pulmonary hygiene, rest  Patient Goals: rest  Family Goals: joan      Problem: Knowledge Deficit - Standard  Goal: Patient and family/care givers will demonstrate understanding of plan of care, disease process/condition, diagnostic tests and medications  Outcome: Progressing  Note: Discussed POC and goals. Educated on smoking cessation. Answered all pt and family questions. No further questions at this time.       Problem: Respiratory  Goal: Patient will achieve/maintain optimum respiratory ventilation and gas exchange  Outcome: Progressing  Note: Collaborating with RT and Interdisciplinary team on pt's treatment measures to improve respiratory function.

## 2023-01-26 NOTE — ASSESSMENT & PLAN NOTE
There is a clinical diagnosis present upon admission in the setting of unintended weight loss secondary to cancer induced cachexia.  BMI is 18

## 2023-01-26 NOTE — ASSESSMENT & PLAN NOTE
He had been on prednisone outpatient by Dr. Aguilar oncology but had run of for nearly a week  He has a prescription for 10 mg prednisone ready in [place] but will likely need to taper down to that level.

## 2023-01-26 NOTE — PROGRESS NOTES
Critical Care Progress Note    Date of admission  1/24/2023    Chief Complaint  57 y.o. male admitted 1/24/2023 with acute hypoxic respiratory failure requiring BiPAP therapy    Hospital Course  Mr. Heath is a 57-year-old male with a past medical history significant for metastatic renal cell carcinoma with lung metastases, COPD, ongoing tobacco abuse, hypertension, and a history of nephrectomy who is been off chemotherapy for about 3 months due to side effects from neuro toxicities who was found with altered mental status in his trailer earlier today with a glucose of 27 and hypothermic at 35 °C.  He was brought to [facility] as the patient lives in [place] where he was given IV glucose with an improved glucose count to 200.  His mental status did improve after rewarming and repeating his glucose.  Unfortunately, the patient had significant respiratory distress and required BiPAP therapy.  The patient was given breathing treatments and antibiotics of azithromycin and ceftriaxone and then sent to [facility] as a direct admit for higher level of care and ongoing BiPAP management on 1/24.  1/25 - HFNC overnight at 60/50%-->down to 30/50%, feeling better, met with Palliative and wants to keep fighting his cancer:  full code.    Interval Problem Update  Reviewed last 24 hour events:   - no events overnight   - pt reports he is feeling a lot better with improved breathing   - a/ox4   - SR 70-90s   - -130s   - afebrile   - regular diet, improved appetite   - up to chair   - UOP of 350cc overnight, urinal   - HFNC at 30/40%-->3 lpm NC   - Duonebs   - steroids   - Azithromycin 3/3 today   - K 4.4   - creat 0.54       Yesterday's Events:   - transitioned to HFNC   - no events overnight   - a/ox4   - SR 60-90s   - -110s   - afebrile   - regular diet, low appetite   - adequate UOP   - BM pta   - HFNC 40/50%   - Duonebs   - lovenox   - Azithromycin day 2/3   - electrolytes  ok   - scheduled steroids    Review of Systems  Review of Systems   Constitutional:  Positive for chills and malaise/fatigue. Negative for fever.   HENT:  Negative for congestion and sore throat.    Eyes:  Negative for discharge and redness.   Respiratory:  Positive for shortness of breath. Negative for cough.    Cardiovascular:  Negative for chest pain and leg swelling.   Gastrointestinal:  Negative for abdominal pain, diarrhea, nausea and vomiting.   Genitourinary:  Negative for flank pain and hematuria.   Musculoskeletal:  Negative for back pain and neck pain.   Skin:  Negative for rash.   Neurological:  Negative for dizziness, sensory change, speech change, focal weakness and headaches.   Endo/Heme/Allergies:  Does not bruise/bleed easily.   Psychiatric/Behavioral:  Negative for depression. The patient is nervous/anxious.     Anxiety has improved since admission    Vital Signs for last 24 hours   Temp:  [36.7 °C (98 °F)-36.9 °C (98.4 °F)] 36.9 °C (98.4 °F)  Pulse:  [71-98] 88  Resp:  [13-61] 31  BP: ()/(63-89) 120/65  SpO2:  [90 %-99 %] 94 %    Hemodynamic parameters for last 24 hours       Respiratory Information for the last 24 hours       Physical Exam   Physical Exam  Vitals and nursing note reviewed.   Constitutional:       General: He is not in acute distress.     Appearance: He is not ill-appearing or toxic-appearing.   HENT:      Head: Normocephalic and atraumatic.      Right Ear: External ear normal.      Left Ear: External ear normal.      Nose: Nose normal. No rhinorrhea.      Mouth/Throat:      Mouth: Mucous membranes are moist.      Pharynx: Oropharynx is clear. No oropharyngeal exudate.      Comments: HFNC in place  Eyes:      General: No scleral icterus.     Conjunctiva/sclera: Conjunctivae normal.      Pupils: Pupils are equal, round, and reactive to light.   Cardiovascular:      Rate and Rhythm: Normal rate and regular rhythm.      Pulses:           Radial pulses are 2+ on the right side  and 2+ on the left side.        Dorsalis pedis pulses are 2+ on the right side and 2+ on the left side.      Heart sounds: Heart sounds are distant. No murmur heard.  Pulmonary:      Breath sounds: No wheezing.      Comments: Breathing comfortably on HFNC, diminished throughout  Chest:      Chest wall: No tenderness.   Abdominal:      Palpations: Abdomen is soft. There is no mass.      Tenderness: There is no guarding.   Musculoskeletal:         General: Normal range of motion.      Cervical back: Normal range of motion and neck supple.      Right lower leg: No edema.      Left lower leg: No edema.   Lymphadenopathy:      Cervical: No cervical adenopathy.   Skin:     General: Skin is warm and dry.      Capillary Refill: Capillary refill takes less than 2 seconds.      Findings: No rash.   Neurological:      Mental Status: He is alert and oriented to person, place, and time.      Cranial Nerves: No cranial nerve deficit.      Sensory: No sensory deficit.      Motor: No weakness.   Psychiatric:         Attention and Perception: Attention normal.         Mood and Affect: Mood and affect normal.         Speech: Speech normal.         Behavior: Behavior normal. Behavior is cooperative.         Thought Content: Thought content normal.         Cognition and Memory: Cognition normal.       Medications  Current Facility-Administered Medications   Medication Dose Route Frequency Provider Last Rate Last Admin    senna-docusate (PERICOLACE or SENOKOT S) 8.6-50 MG per tablet 2 Tablet  2 Tablet Oral BID Nina Danielle M.D.        And    polyethylene glycol/lytes (MIRALAX) PACKET 1 Packet  1 Packet Oral QDAY PRN Nina Danielle M.D.        And    magnesium hydroxide (MILK OF MAGNESIA) suspension 30 mL  30 mL Oral QDAY PRN Nina Danielle M.D.        And    bisacodyl (DULCOLAX) suppository 10 mg  10 mg Rectal QDAY PRN Nina Danielle M.D.        Respiratory Therapy Consult   Nebulization Continuous RT Nina Danielle  M.D.        MD Alert...ICU Electrolyte Replacement per Pharmacy   Other PHARMACY TO DOSE Nina Danielle M.D.        enoxaparin (Lovenox) inj 40 mg  40 mg Subcutaneous DAILY AT 1800 Nina Danielle M.D.   40 mg at 01/25/23 1737    acetaminophen (Tylenol) tablet 650 mg  650 mg Oral Q6HRS PRN Nina Danielle M.D.        ondansetron (ZOFRAN) syringe/vial injection 4 mg  4 mg Intravenous Q4HRS PRN Nina Danielle M.D.   4 mg at 01/25/23 1614    ondansetron (ZOFRAN ODT) dispertab 4 mg  4 mg Oral Q4HRS PRN Nina Danielle M.D.        promethazine (PHENERGAN) tablet 12.5-25 mg  12.5-25 mg Oral Q4HRS PRN Nina Danielle M.D.        promethazine (PHENERGAN) suppository 12.5-25 mg  12.5-25 mg Rectal Q4HRS PRN Nina Danielle M.D.        prochlorperazine (COMPAZINE) injection 5-10 mg  5-10 mg Intravenous Q4HRS PRN Nina Danielle M.D.        Respiratory Therapy Consult   Nebulization Continuous RT Nina Danielle M.D.        ipratropium-albuterol (DUONEB) nebulizer solution  3 mL Nebulization Q4HRS (RT) Nina Danielle M.D.   3 mL at 01/26/23 0614    ipratropium-albuterol (DUONEB) nebulizer solution  3 mL Nebulization Q2HRS PRN (RT) Nina Danielle M.D.        methylPREDNISolone (SOLU-MEDROL) 40 MG injection 40 mg  40 mg Intravenous Q8HRS Nina Danielle M.D.   40 mg at 01/26/23 0610    ALPRAZolam (XANAX) tablet 0.25 mg  0.25 mg Oral 4X/DAY PRN Nina Danielle M.D.   0.25 mg at 01/24/23 1611    D5LR infusion   Intravenous Continuous Nina Danielle M.D. 83 mL/hr at 01/25/23 1800 Rate Verify at 01/25/23 1800    Nozin nasal  swab  1 Applicator Each Nostril BID Nina Danielle M.D.   1 Applicator at 01/26/23 0610       Fluids    Intake/Output Summary (Last 24 hours) at 1/26/2023 0700  Last data filed at 1/26/2023 0600  Gross per 24 hour   Intake 2262.16 ml   Output 350 ml   Net 1912.16 ml         Laboratory          Recent Labs     01/24/23  0727 01/25/23  0535 01/26/23  0405   SODIUM 140  135 134*   POTASSIUM 3.4* 4.6 4.4   CHLORIDE 103 104 99   CO2 14* 21 24   BUN 10 10 7*   CREATININE 0.75 0.56 0.54   MAGNESIUM 1.5 2.0 1.8   PHOSPHORUS 5.5* 3.7 2.8   CALCIUM 8.3* 8.0* 8.4*       Recent Labs     01/24/23  0727 01/25/23  0535 01/26/23  0405   ALTSGPT 12  --   --    ASTSGOT 46*  --   --    ALKPHOSPHAT 194*  --   --    TBILIRUBIN 0.6  --   --    GLUCOSE 61* 173* 158*       Recent Labs     01/24/23  0727 01/25/23  0535 01/26/23  0405   WBC 3.7* 8.6 7.4   NEUTSPOLYS 95.70* 91.30* 94.80*   LYMPHOCYTES 3.40* 4.10* 1.80*   MONOCYTES 0.90 3.50 1.90   EOSINOPHILS 0.00 0.00 0.00   BASOPHILS 0.00 0.40 0.00   ASTSGOT 46*  --   --    ALTSGPT 12  --   --    ALKPHOSPHAT 194*  --   --    TBILIRUBIN 0.6  --   --        Recent Labs     01/24/23 0727 01/25/23  0535 01/26/23  0405   RBC 3.04* 2.87* 2.87*   HEMOGLOBIN 11.8* 11.4* 11.2*   HEMATOCRIT 34.6* 32.6* 33.2*   PLATELETCT 195 190 202         Imaging  ECHO:  No prior study is available for comparison.   The left ventricular ejection fraction is visually estimated to be 65%.  Unable to estimate right ventricular systolic pressure due to an   inadequate tricuspid regurgitant jet    Assessment/Plan  * Acute respiratory failure with hypoxia (HCC)- (present on admission)  Assessment & Plan  Likely due to COPD exacerbation  Improving and transitioned to NC this morning  RT/O2 protocols  Diuresis as needed      Macrocytic anemia- (present on admission)  Assessment & Plan  No acute blood loss noted  Hb goals > 7    Hypomagnesemia- (present on admission)  Assessment & Plan  Replete to goal > 2    Hypokalemia- (present on admission)  Assessment & Plan  Replete to goals > 4    Hypoglycemia- (present on admission)  Assessment & Plan  ?due to metastatic disease  S/p D5 infusion as this has resolved    Acute exacerbation of chronic obstructive pulmonary disease (COPD) (HCC)- (present on admission)  Assessment & Plan  RT/O2 protocols  HFNC-->transitioned to NC with marked  improvement of distress overall  O2 sat goals > 88%  Scheduled steroids  Scheduled and PRN bronchodilators  S/p Azithromycin for 3 days       Renal carcinoma (HCC)- (present on admission)  Assessment & Plan  With metastatic disease to lungs  ?disease to liver or bones  Off chemotherapy for 3 months due to neuro side effects  Palliative care consulted for advance care planning-->FULL CODE         VTE:  Lovenox  Ulcer: Not Indicated  Lines:  PIVs    I have performed a physical exam and reviewed and updated ROS and Plan today (1/26/2023). In review of yesterday's note (1/25/2023), there are no changes except as documented above.     Discussed patient condition and risk of morbidity and/or mortality with RN, RT, Therapies, Pharmacy, Code status disscussed, and Patient    The patient has remarkably improved from his COPD exacerbation with acute hypoxic respiratory failure with weaning of his FiO2 and now on only nasal cannula.  He is also remarkably less anxious.  The patient no longer requires the ICU and will be transferred to the telemetry floor today.  Case was discussed with Dr. Saenz who will take over care at this time.  The critical care team will sign off at this point.

## 2023-01-26 NOTE — ASSESSMENT & PLAN NOTE
Requiring rescue BiPAP and high flow oxygen  Acute on chronic condition as he is on 1-2 liters of oxygen at home

## 2023-01-26 NOTE — PROGRESS NOTES
4 Eyes Skin Assessment Completed by TYRESE Pereyra and Zachery, ISMAEL    Head WDL  Ears WDL  Nose WDL  Mouth WDL  Neck WDL  Breast/Chest WDL  Shoulder Blades WDL  Spine WDL  (R) Arm/Elbow/Hand Redness, Blanching, and Bruising, scabs  (L) Arm/Elbow/Hand Redness, Blanching, and Bruising, scabs  Abdomen WDL, healed surgical scar  Groin WDL  Scrotum/Coccyx/Buttocks Redness and Blanching, swelling in scrotum  (R) Leg Redness, Blanching, and Bruising, scabs on leg  (L) Leg Redness, Blanching, Bruising, and Abrasion on left hip, scabs on leg  (R) Heel/Foot/Toe WDL, dry flaky  (L) Heel/Foot/Toe WDL, dry flaky          Devices In Places Tele Box, Pulse Ox, and Nasal Cannula      Interventions In Place Gray Ear Foams, Heel Mepilex, Sacral Mepilex, and Pillows    Possible Skin Injury No    Pictures Uploaded Into Epic N/A  Wound Consult Placed N/A  RN Wound Prevention Protocol Ordered Yes

## 2023-01-26 NOTE — PROGRESS NOTES
Pt arrived to unit via wheelchair. Pt oriented to room, unit, and plan of care. Tele-monitor placed and monitor room notified. All questions answered at this time. Call light within reach; fall precautions in place.

## 2023-01-26 NOTE — ASSESSMENT & PLAN NOTE
Initial glucose was 27  He had been off of prednisone for a week and not eating  Steroids and a diet

## 2023-01-26 NOTE — RESPIRATORY CARE
"COPD EDUCATION by COPD CLINICAL EDUCATOR  (Phone: [phone])  1/26/2023 at 1:59 PM by Isabella Ferris RRT    Patient seen by Respiratory Education team to complete the final block of education.  This session discussed signs and symptoms of an exacerbation (flare-up), triggers that can create flare-ups, reiteration of the \"Action Plan\" to refer to daily which will help categorize their symptoms in order to utilize the appropriate therapy, breathing techniques used to treat acute symptoms, and oxygen safety. Smoking Cessation was discussed as appropriate to this patient. Question and answer session followed.   Recommended patient get pulmonary function testing, this can be performed at [facility] in [place], [place]. Requested order for outpatient pulmonary function testing from attending MD.     COPD Screen  COPD Risk Screening  Do you have a history of COPD?: Yes  Do you have a Pulmonologist?: No  COPD Population Screener  During the past 4 weeks, how much did you feel short of breath?: Some of the time  Do you ever cough up any mucus or phlegm?: No/only with occasional colds or infections  In the past 12 months, you do less than you used to because of your breathing problems: Agree  Have you smoked at least 100 cigarettes in your entire life?: Yes  How old are you?: 50-59  COPD Screening Score: 5  COPD Coordinator Recommended: Yes    COPD Assessment  COPD Clinical Specialists ONLY  COPD Education Initiated: Yes--Full Intervention  COPD Education Session 1: Yes  COPD Education Session 2: Yes (Requested order for OP PFT, can be done at [facility] in [place])  DME Company: previously established with [employer] Equipment Type: POC only, CM made aware  Physician Follow Up Appointment:  (declined appt assistance, mother makes appt's)  Physician Name: Michael Sultana M.D.  Pulmonologist Name: declined follow up with Pulmonary unless we can arrange for virtual appt (difficultly traveling to [place])  Palliative Care: " "Yes (consult 1/25/2023)  Durable Power of : No  Advance Directive: No  Discussion with others: Yes  Referrals Initiated: Yes  Pulmonary Rehab: Yes  Smoking Cessation: Yes  $ Smoking Cessation 3-10 Minutes: Symptomatic (10 min, declined literature)  Smoking Pack Years: 30  Palliative Care: Yes  Hospice: N/A  Home Health Care: N/A  [facility] Outreach: N/A  [facility]: N/A  [facility]: N/A  Private In-Home Care Agency: N/A  Is this a COPD exacerbation patient?: Yes (per MD notes, order set used, IP Pulmonary notified)  $ Demo/Eval of SVN's, MDI's and Aerosols: Yes  (OP) Pulmonary Function Testing:  (order requested for outpatient PFT, able to schedule at [facility] in [place])    PFT Results    No results found for: PFT    Meds to Beds  Would the patient like to opt in for Bedside Medication Delivery at Discharge?: Yes, interested     MY COPD ACTION PLAN     It is recommended that patients and physicians /healthcare providers complete this action plan together. This plan should be discussed at each physician visit and updated as needed.    The green, yellow and red zones show groups of symptoms of COPD. This list of symptoms is not comprehensive, and you may experience other symptoms. In the \"Actions\" column, your healthcare provider has recommended actions for you to take based on your symptoms.    Patient Name: Zachery Heath   YOB: 1965   Last Updated on:     Green Zone:  I am doing well today Actions     Usual activitiy and exercise level   Take daily medications     Usual amounts of cough and phlegm/mucus   Use oxygen as prescribed     Sleep well at night   Continue regular exercise/diet plan     Appetite is good   At all times avoid cigarette smoke, inhaled irritants     Daily Medications (these medications are taken every day):                Yellow Zone:  I am having a bad day or a COPD flare Actions     More breathless than usual   Continue daily medications    " " I have less energy for my daily activities   Use quick relief inhaler as ordered     Increased or thicker phlegm/mucus   Use oxygen as prescribed     Using quick relief inhaler/nebulizer more often   Get plenty of rest     Swelling of ankles more than usual   Use pursed lip breathing     More coughing than usual   At all times avoid cigarette smoke, inhaled irritants     I feel like I have a \"chest cold\"     Poor sleep and my symptoms woke me up     My appetite is not good     My medicine is not helping      Call provider immediately if symptoms don’t improve     Continue daily medications, add rescue medications:   Levalbuterol (Xopenex) 2 Puffs         Medications to be used during a flare up, (as Discussed with Provider):           Additional Information:  Use the spacer with your rescue inhaler, take as directed.     Red Zone:  I need urgent medical care Actions     Severe shortness of breath even at rest   Call 911 or seek medical care immediately     Not able to do any activity because of breathing      Fever or shaking chills      Feeling confused or very drowsy       Chest pains      Coughing up blood                  "

## 2023-01-26 NOTE — ASSESSMENT & PLAN NOTE
Stage IV renal cell carcinoma status post nephrectomy with metastasis to lungs  He is followed by Dr. Aguilar oncology.  He is undergone chemotherapy and immunotherapy

## 2023-01-26 NOTE — CARE PLAN
Problem: Knowledge Deficit - Standard  Goal: Patient and family/care givers will demonstrate understanding of plan of care, disease process/condition, diagnostic tests and medications  Outcome: Progressing     Problem: Knowledge Deficit - COPD  Goal: Patient/significant other demonstrates understanding of disease process, utilization of the Action Plan, medications and discharge instruction  Outcome: Progressing     Problem: Risk for Infection - COPD  Goal: Patient will remain free from signs and symptoms of infection  Outcome: Progressing     Problem: Nutrition - Advanced  Goal: Patient will display progressive weight gain toward goal have adequate food and fluid intake  Outcome: Progressing     Problem: Impaired Gas Exchange  Goal: Patient will demonstrate improved ventilation and adequate oxygenation and participate in treatment regimen within the level of ability/situation.  Outcome: Progressing     Problem: Risk for Aspiration  Goal: Patient's risk for aspiration will be absent or decrease  Outcome: Progressing     Problem: Self Care  Goal: Patient will have the ability to perform ADLs independently or with assistance (bathe, groom, dress, toilet and feed)  Outcome: Progressing     Problem: Skin Integrity  Goal: Skin integrity is maintained or improved  Outcome: Progressing     Problem: Fall Risk  Goal: Patient will remain free from falls  Outcome: Progressing   The patient is Stable - Low risk of patient condition declining or worsening    Shift Goals  Clinical Goals: wean O2, pulmonary hygiene, rest  Patient Goals: rest  Family Goals: joan    Progress made toward(s) clinical / shift goals:      Patient is not progressing towards the following goals:

## 2023-01-26 NOTE — CONSULTS
Hospital Medicine Consultation    Date of Service  1/26/2023    Referring Physician  Tre Saenz M.D.    Consulting Physician  Nina Danielle M.D.    Reason for Consultation  Respiratory failure    History of Presenting Illness  57 y.o. male who presented 1/24/2023 with decreased level of consciousness.  Mr. Heath has a past medical history of metastatic renal cell carcinoma status post nephrectomy and metastasis to lungs but has undergone chemotherapy last dose about 3 months ago.  He also has a history of oxygen dependent COPD.  He was brought to the emergency room in [facility] with decreased level of consciousness was found to have a glucose of 27 despite not being on diabetic medications was hypothermic at 35 degrees.  Was brought to the emergency room where he was given glucose and rewarming.  He required rescue BiPAP and was transferred here for ICU admission for rescue positive pressure ventilation.  He was de-escalated to high flow oxygen was treated with empiric antibiotics and steroids.    1/26/2023: Patient seen and evaluated in the ICU.  He was on high flow oxygen this morning and has been tapered down to 3 L.  He is on IV steroids and feels much better on the steroids.  Upon questioning he had been out of the prednisone outpatient for about 6 to 7 days and was found to have a glucose of 27.  Apparently when he is off his prednisone he stops eating.  He does have a prescription for 10 mg tablets of prednisone from Dr. Aguilar his oncologist but had not been able to pick them up and start them yet.    Review of Systems  Review of Systems   Constitutional:  Positive for weight loss. Negative for fever.   Respiratory:  Positive for cough and shortness of breath. Negative for hemoptysis, sputum production and wheezing.    Cardiovascular:  Negative for chest pain.   Gastrointestinal:  Negative for nausea and vomiting.        Poor appetite though he is tolerating protein supplements   All other  systems reviewed and are negative.    Past Medical History   has a past medical history of Bowel habit changes (2021), Breath shortness (07/2021), Cancer (HCC) (7/2017), COPD (chronic obstructive pulmonary disease) (HCC), Emphysema of lung (HCC) (2010), Pain, Pain (07/2021), Pneumonia (07/2021), Psychiatric problem, Renal disorder (2017), and Urinary bladder disorder.    Surgical History   has a past surgical history that includes nephrectomy radical (Right, 8/23/2017) and other (Left, 2021).    Family History  No family history of blood clots to his knowledge    Social History   reports that he has been smoking cigarettes. He has a 7.50 pack-year smoking history. He has never used smokeless tobacco. He reports current alcohol use. He reports current drug use. Drug: Inhaled.    Medications  Prior to Admission Medications   Prescriptions Last Dose Informant Patient Reported? Taking?   Axitinib (INLYTA) 5 MG Tab   Yes No   Sig: Take  by mouth 2 times a day.   KEYTRUDA 100 MG/4ML Solution injection   Yes No   levETIRAcetam (KEPPRA) 500 MG Tab   No No   Sig: Take 1 Tablet by mouth 2 times a day.   levalbuterol (XOPENEX HFA) 45 MCG/ACT inhaler  Patient Yes No   Sig: Inhale 1-2 Puffs every morning as needed for Shortness of Breath.   loratadine (CLARITIN) 10 MG Tab   Yes No   meloxicam (MOBIC) 7.5 MG Tab  Patient's Home Pharmacy Yes Yes   Sig: Take 7.5 mg by mouth every day. Take one tablet by mouth as needed for breakthrough pain  Indications: Breakthrough pain   nicotine polacrilex (NICORETTE) 4 MG gum  Patient Yes No   Sig: Take 4 mg by mouth as needed.   predniSONE (DELTASONE) 10 MG Tab  Patient's Home Pharmacy Yes Yes   Sig: Take 10 mg by mouth every day. Take one tablet by mouth once daily   sertraline (ZOLOFT) 100 MG Tab  Patient Yes No   Sig: Take 100 mg by mouth every day.      Facility-Administered Medications: None       Allergies  No Known Allergies    Physical Exam  Temp:  [36.7 °C (98 °F)-36.9 °C (98.4 °F)]  36.9 °C (98.4 °F)  Pulse:  [71-98] 97  Resp:  [13-61] 19  BP: ()/(63-89) 102/79  SpO2:  [90 %-98 %] 94 %    Physical Exam  Vitals and nursing note reviewed.   Constitutional:       General: He is not in acute distress.     Comments: Quite thin and cachectic appearing  Chronically ill-appearing   HENT:      Head:      Comments: Temporal wasting     Mouth/Throat:      Mouth: Mucous membranes are dry.      Pharynx: Oropharynx is clear.   Eyes:      General: No scleral icterus.     Conjunctiva/sclera: Conjunctivae normal.   Cardiovascular:      Rate and Rhythm: Normal rate and regular rhythm.   Pulmonary:      Effort: Pulmonary effort is normal.      Comments: Prolonged expiratory phase though no wheezing normal work of breathing 3 L nasal cannula  Abdominal:      General: There is no distension.      Tenderness: There is no abdominal tenderness.   Musculoskeletal:      Cervical back: Normal range of motion and neck supple.      Right lower leg: No edema.      Left lower leg: No edema.   Skin:     General: Skin is warm and dry.   Neurological:      General: No focal deficit present.      Mental Status: He is alert and oriented to person, place, and time.   Psychiatric:         Mood and Affect: Mood normal.         Behavior: Behavior normal.         Thought Content: Thought content normal.         Judgment: Judgment normal.       Fluids      Laboratory  Recent Labs     01/24/23 0727 01/25/23  0535 01/26/23  0405   WBC 3.7* 8.6 7.4   RBC 3.04* 2.87* 2.87*   HEMOGLOBIN 11.8* 11.4* 11.2*   HEMATOCRIT 34.6* 32.6* 33.2*   .8* 113.6* 115.7*   MCH 38.8* 39.7* 39.0*   MCHC 34.1 35.0 33.7   RDW 75.7* 77.5* 80.1*   PLATELETCT 195 190 202   MPV 8.2* 8.7* 8.7*     Recent Labs     01/24/23 0727 01/25/23  0535 01/26/23  0405   SODIUM 140 135 134*   POTASSIUM 3.4* 4.6 4.4   CHLORIDE 103 104 99   CO2 14* 21 24   GLUCOSE 61* 173* 158*   BUN 10 10 7*   CREATININE 0.75 0.56 0.54   CALCIUM 8.3* 8.0* 8.4*                      Imaging  EC-ECHOCARDIOGRAM COMPLETE W/O CONT   Final Result      DX-CHEST-PORTABLE (1 VIEW)   Final Result      1.  Interval development of nodular opacities within the right lung which are indeterminate, infectious/inflammatory versus neoplastic.   2.  Similar appearance of volume loss in the left lung with interstitial hazy opacity and chronic blunting of the left costophrenic angle.          Assessment/Plan  * Acute respiratory failure with hypoxia (HCC)- (present on admission)  Assessment & Plan  Requiring rescue BiPAP and high flow oxygen  Acute on chronic condition as he is on 1-2 liters of oxygen at home      Hypoglycemia- (present on admission)  Assessment & Plan  Initial glucose was 27  He had been off of prednisone for a week and not eating  Steroids and a diet    Renal carcinoma (HCC)- (present on admission)  Assessment & Plan  Stage IV renal cell carcinoma status post nephrectomy with metastasis to lungs  He is followed by Dr. Aguilar oncology.  He is undergone chemotherapy and immunotherapy    Acute exacerbation of chronic obstructive pulmonary disease (COPD) (HCC)- (present on admission)  Assessment & Plan  Steroids and bronchodilators   Smoking cessation encouraged    Protein-calorie malnutrition, severe (HCC)- (present on admission)  Assessment & Plan  There is a clinical diagnosis present upon admission in the setting of unintended weight loss secondary to cancer induced cachexia.  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 Aguilar oncology but had run of for nearly a week  He has a prescription for 10 mg prednisone ready in [place] but will likely need to taper down to that level.    Hypomagnesemia- (present on admission)  Assessment & Plan  Replacement was given    Hypokalemia- (present on admission)  Assessment & Plan  Replacement was given

## 2023-01-27 ENCOUNTER — PHARMACY VISIT (OUTPATIENT)
Dept: PHARMACY | Facility: MEDICAL CENTER | Age: 58
End: 2023-01-27
Payer: COMMERCIAL

## 2023-01-27 ENCOUNTER — PATIENT OUTREACH (OUTPATIENT)
Dept: SCHEDULING | Facility: IMAGING CENTER | Age: 58
End: 2023-01-27
Payer: MEDICAID

## 2023-01-27 VITALS
HEART RATE: 84 BPM | RESPIRATION RATE: 18 BRPM | WEIGHT: 139.55 LBS | OXYGEN SATURATION: 94 % | SYSTOLIC BLOOD PRESSURE: 112 MMHG | HEIGHT: 68 IN | BODY MASS INDEX: 21.15 KG/M2 | DIASTOLIC BLOOD PRESSURE: 79 MMHG | TEMPERATURE: 97.9 F

## 2023-01-27 PROBLEM — J96.01 ACUTE RESPIRATORY FAILURE WITH HYPOXIA (HCC): Status: RESOLVED | Noted: 2023-01-24 | Resolved: 2023-01-27

## 2023-01-27 PROBLEM — E83.42 HYPOMAGNESEMIA: Status: RESOLVED | Noted: 2023-01-24 | Resolved: 2023-01-27

## 2023-01-27 PROBLEM — J44.1 ACUTE EXACERBATION OF CHRONIC OBSTRUCTIVE PULMONARY DISEASE (COPD) (HCC): Status: RESOLVED | Noted: 2021-01-27 | Resolved: 2023-01-27

## 2023-01-27 PROBLEM — E87.6 HYPOKALEMIA: Status: RESOLVED | Noted: 2023-01-24 | Resolved: 2023-01-27

## 2023-01-27 PROBLEM — E16.2 HYPOGLYCEMIA: Status: RESOLVED | Noted: 2023-01-24 | Resolved: 2023-01-27

## 2023-01-27 PROCEDURE — 94640 AIRWAY INHALATION TREATMENT: CPT

## 2023-01-27 PROCEDURE — 94669 MECHANICAL CHEST WALL OSCILL: CPT

## 2023-01-27 PROCEDURE — 99239 HOSP IP/OBS DSCHRG MGMT >30: CPT | Performed by: HOSPITALIST

## 2023-01-27 PROCEDURE — RXMED WILLOW AMBULATORY MEDICATION CHARGE: Performed by: HOSPITALIST

## 2023-01-27 PROCEDURE — 94760 N-INVAS EAR/PLS OXIMETRY 1: CPT

## 2023-01-27 PROCEDURE — 700101 HCHG RX REV CODE 250: Performed by: HOSPITALIST

## 2023-01-27 PROCEDURE — 700111 HCHG RX REV CODE 636 W/ 250 OVERRIDE (IP): Performed by: HOSPITALIST

## 2023-01-27 RX ORDER — PREDNISONE 10 MG/1
TABLET ORAL
Qty: 70 TABLET | Refills: 0 | Status: SHIPPED | OUTPATIENT
Start: 2023-01-27 | End: 2023-04-02

## 2023-01-27 RX ADMIN — PREDNISONE 40 MG: 20 TABLET ORAL at 05:13

## 2023-01-27 RX ADMIN — IPRATROPIUM BROMIDE AND ALBUTEROL SULFATE 3 ML: 2.5; .5 SOLUTION RESPIRATORY (INHALATION) at 06:07

## 2023-01-27 RX ADMIN — IPRATROPIUM BROMIDE AND ALBUTEROL SULFATE 3 ML: 2.5; .5 SOLUTION RESPIRATORY (INHALATION) at 10:09

## 2023-01-27 NOTE — PROGRESS NOTES
D/c instructions given, educated on worsening s/s. Pt understands and questions answered. Pt has own oxygen

## 2023-01-27 NOTE — CARE PLAN
Problem: Bronchoconstriction  Goal: Improve in air movement and diminished wheezing  Description: Target End Date:  2 to 3 days    1.  Implement inhaled treatments  2.  Evaluate and manage medication effects  Outcome: Progressing  Flowsheets (Taken 1/25/2023 1400 by Isabella Ferris, MAHESH)  Bronchodilator Indications: History / Diagnosis  Note:     Respiratory Update    Treatment modality: DUO neb   Frequency: Q4    Pt tolerating current treatments well with no adverse reactions.       Problem: Bronchopulmonary Hygiene  Goal: Increase mobilization of retained secretions  Description: Target End Date:  2 to 3 days    1.  Perform bronchopulmonary therapy as indicated by assessment  2.  Perform airway suctioning  3.  Perform actions to maintain patient airway  Outcome: Progressing  Flowsheets (Taken 1/25/2023 1400 by Isabella Ferris, MAHESH)  Bronchopulmonary Hygiene Indications: Patient with Chronic Pulmonary Disease on Home Frequency  Note:     Respiratory Update    Treatment modality: Flutter    Frequency: QID    Pt tolerating current treatments well with no adverse reactions.

## 2023-01-27 NOTE — CARE PLAN
The patient is Stable - Low risk of patient condition declining or worsening    Shift Goals  Clinical Goals: monitor O2  Patient Goals: rest  Family Goals: joan    Progress made toward(s) clinical / shift goals:  progressing    Problem: Knowledge Deficit - Standard  Goal: Patient and family/care givers will demonstrate understanding of plan of care, disease process/condition, diagnostic tests and medications  1/27/2023 0000 by Zoe Leblanc R.N.  Outcome: Progressing  1/26/2023 2343 by Zoe Leblanc R.N.  Outcome: Progressing        Problem: Risk for Infection - COPD  Goal: Patient will remain free from signs and symptoms of infection  1/27/2023 0000 by Zoe Leblanc R.N.  Outcome: Progressing  1/26/2023 2343 by Zoe Leblanc R.N.  Outcome: Progressing        Problem: Ineffective Airway Clearance  Goal: Patient will maintain patent airway with clear/clearing breath sounds  1/27/2023 0000 by Zoe Leblanc R.N.  Outcome: Progressing  1/26/2023 2343 by Zoe Leblanc R.N.  Outcome: Progressing     Problem: Impaired Gas Exchange  Goal: Patient will demonstrate improved ventilation and adequate oxygenation and participate in treatment regimen within the level of ability/situation.  1/27/2023 0000 by Zoe Leblanc R.N.  Outcome: Progressing  1/26/2023 2343 by Zoe Leblanc R.N.  Outcome: Progressing     Problem: Risk for Aspiration  Goal: Patient's risk for aspiration will be absent or decrease  1/27/2023 0000 by Zoe Leblanc R.N.  Outcome: Progressing  1/26/2023 2343 by Zoe Leblanc R.N.  Outcome: Progressing     Problem: Self Care  Goal: Patient will have the ability to perform ADLs independently or with assistance (bathe, groom, dress, toilet and feed)  1/27/2023 0000 by Zoe Leblanc R.N.  Outcome: Progressing  1/26/2023 2343 by Zoe Leblanc R.N.  Outcome: Progressing     Problem: Skin Integrity  Goal: Skin integrity is maintained or improved  1/27/2023 0000 by  Zoe Leblanc R.N.  Outcome: Progressing  1/26/2023 2343 by Zoe Leblanc R.N.  Outcome: Progressing     Problem: Fall Risk  Goal: Patient will remain free from falls  1/27/2023 0000 by Zoe Leblanc R.N.  Outcome: Progressing  1/26/2023 2343 by Zoe Leblanc R.N.  Outcome: Progressing     Problem: Respiratory  Goal: Patient will achieve/maintain optimum respiratory ventilation and gas exchange  1/27/2023 0000 by Zoe Leblanc R.N.  Outcome: Progressing  1/26/2023 2343 by Zoe Leblanc R.N.  Outcome: Progressing       Patient is not progressing towards the following goals:

## 2023-01-27 NOTE — DISCHARGE INSTRUCTIONS
Hypoglycemia  Hypoglycemia is when the sugar (glucose) level in your blood is too low. Signs of low blood sugar may include:  Feeling:  Hungry.  Worried or nervous (anxious).  Sweaty and clammy.  Confused.  Dizzy.  Sleepy.  Sick to your stomach (nauseous).  Having:  A fast heartbeat.  A headache.  A change in your vision.  Tingling or no feeling (numbness) around your mouth, lips, or tongue.  Jerky movements that you cannot control (seizure).  Having trouble with:  Moving (coordination).  Sleeping.  Passing out (fainting).  Getting upset easily (irritability).  Low blood sugar can happen to people who have diabetes and people who do not have diabetes. Low blood sugar can happen quickly, and it can be an emergency.  Treating low blood sugar  Low blood sugar is often treated by eating or drinking something sugary right away, such as:  Fruit juice, 4-6 oz (120-150 mL).  Regular soda (not diet soda), 4-6 oz (120-150 mL).  Low-fat milk, 4 oz (120 mL).  Several pieces of hard candy.  Sugar or honey, 1 Tbsp (15 mL).  Treating low blood sugar if you have diabetes  If you can think clearly and swallow safely, follow the 15:15 rule:  Take 15 grams of a fast-acting carb (carbohydrate). Talk with your doctor about how much you should take.  Always keep a source of fast-acting carb with you, such as:  Sugar tablets (glucose pills). Take 3-4 pills.  6-8 pieces of hard candy.  4-6 oz (120-150 mL) of fruit juice.  4-6 oz (120-150 mL) of regular (not diet) soda.  1 Tbsp (15 mL) honey or sugar.  Check your blood sugar 15 minutes after you take the carb.  If your blood sugar is still at or below 70 mg/dL (3.9 mmol/L), take 15 grams of a carb again.  If your blood sugar does not go above 70 mg/dL (3.9 mmol/L) after 3 tries, get help right away.  After your blood sugar goes back to normal, eat a meal or a snack within 1 hour.    Treating very low blood sugar  If your blood sugar is at or below 54 mg/dL (3 mmol/L), you have very low  blood sugar (severe hypoglycemia). This may also cause:  Passing out.  Jerky movements you cannot control (seizure).  Losing consciousness (coma).  This is an emergency. Do not wait to see if the symptoms will go away. Get medical help right away. Call your local emergency services (911 in the [place] Do not drive yourself to the hospital.  If you have very low blood sugar and you cannot eat or drink, you may need a glucagon shot (injection). A family member or friend should learn how to check your blood sugar and how to give you a glucagon shot. Ask your doctor if you need to have a glucagon shot kit at home.  Follow these instructions at home:  General instructions  Take over-the-counter and prescription medicines only as told by your doctor.  Stay aware of your blood sugar as told by your doctor.  Limit alcohol intake to no more than 1 drink a day for nonpregnant women and 2 drinks a day for men. One drink equals 12 oz of beer (355 mL), 5 oz of wine (148 mL), or 1½ oz of hard liquor (44 mL).  Keep all follow-up visits as told by your doctor. This is important.  If you have diabetes:    Follow your diabetes care plan as told by your doctor. Make sure you:  Know the signs of low blood sugar.  Take your medicines as told.  Follow your exercise and meal plan.  Eat on time. Do not skip meals.  Check your blood sugar as often as told by your doctor. Always check it before and after exercise.  Follow your sick day plan when you cannot eat or drink normally. Make this plan ahead of time with your doctor.  Share your diabetes care plan with:  Your work or school.  People you live with.  Check your pee (urine) for ketones:  When you are sick.  As told by your doctor.  Carry a card or wear jewelry that says you have diabetes.  Contact a doctor if:  You have trouble keeping your blood sugar in your target range.  You have low blood sugar often.  Get help right away if:  You still have symptoms after you eat or drink something  sugary.  Your blood sugar is at or below 54 mg/dL (3 mmol/L).  You have jerky movements that you cannot control.  You pass out.  These symptoms may be an emergency. Do not wait to see if the symptoms will go away. Get medical help right away. Call your local emergency services (911 in the [place] Do not drive yourself to the hospital.  Summary  Hypoglycemia happens when the level of sugar (glucose) in your blood is too low.  Low blood sugar can happen to people who have diabetes and people who do not have diabetes. Low blood sugar can happen quickly, and it can be an emergency.  Make sure you know the signs of low blood sugar and know how to treat it.  Always keep a source of sugar (fast-acting carb) with you to treat low blood sugar.  This information is not intended to replace advice given to you by your health care provider. Make sure you discuss any questions you have with your health care provider.  Document Released: 03/14/2011 Document Revised: 04/09/2020 Document Reviewed: 01/20/2017  [employer] Patient Education © 2020 [employer]    Altered Mental Status  Altered mental status most often refers to an abnormal change in your responsiveness and awareness. It can affect your speech, thought, mobility, memory, attention span, or alertness. It can range from slight confusion to complete unresponsiveness (coma). Altered mental status can be a sign of a serious underlying medical condition. Rapid evaluation and medical treatment is necessary for patients having an altered mental status.  CAUSES   Low blood sugar (hypoglycemia) or diabetes.  Severe loss of body fluids (dehydration) or a body salt (electrolyte) imbalance.  A stroke or other neurologic problem, such as dementia or delirium.  A head injury or tumor.  A drug or alcohol overdose.  Exposure to toxins or poisons.  Depression, anxiety, and stress.  A low oxygen level (hypoxia).  An infection.  Blood loss.  Twitching or shaking (seizure).  Heart problems,  such as heart attack or heart rhythm problems (arrhythmias).  A body temperature that is too low or too high (hypothermia or hyperthermia).  DIAGNOSIS   A diagnosis is based on your history, symptoms, physical and neurologic examinations, and diagnostic tests. Diagnostic tests may include:  Measurement of your blood pressure, pulse, breathing, and oxygen levels (vital signs).  Blood tests.  Urine tests.  X-ray exams.  A computerized magnetic scan (magnetic resonance imaging, MRI).  A computerized X-ray scan (computed tomography, CT scan).  TREATMENT   Treatment will depend on the cause. Treatment may include:  Management of an underlying medical or mental health condition.  Critical care or support in the hospital.  HOME CARE INSTRUCTIONS   Only take over-the-counter or prescription medicines for pain, discomfort, or fever as directed by your caregiver.  Manage underlying conditions as directed by your caregiver.  Eat a healthy, well-balanced diet to maintain strength.  Join a support group or prevention program to cope with the condition or trauma that caused the altered mental status. Ask your caregiver to help choose a program that works for you.  Follow up with your caregiver for further examination, therapy, or testing as directed.  SEEK MEDICAL CARE IF:   You feel unwell or have chills.  You or your family notice a change in your behavior or your alertness.  You have trouble following your caregiver's treatment plan.  You have questions or concerns.  SEEK IMMEDIATE MEDICAL CARE IF:   You have a rapid heartbeat or have chest pain.  You have difficulty breathing.  You have a fever.  You have a headache with a stiff neck.  You cough up blood.  You have blood in your urine or stool.  You have severe agitation or confusion.  MAKE SURE YOU:   Understand these instructions.  Will watch your condition.  Will get help right away if you are not doing well or get worse.     This information is not intended to replace  advice given to you by your health care provider. Make sure you discuss any questions you have with your health care provider.     Document Released: 06/07/2011 Document Revised: 03/11/2013 Document Reviewed: 02/11/2016  [employer] Interactive Patient Education ©2016 [employer]

## 2023-01-27 NOTE — DISCHARGE SUMMARY
Discharge Summary    CHIEF COMPLAINT ON ADMISSION  No chief complaint on file.      Reason for Admission  Respiratory failure, hypothermia     Admission Date  1/24/2023    CODE STATUS  Full Code    HPI & HOSPITAL COURSE  As per dr meza consult    57 y.o. male who presented 1/24/2023 with decreased level of consciousness.  Mr. Heath has a past medical history of metastatic renal cell carcinoma status post nephrectomy and metastasis to lungs but has undergone chemotherapy last dose about 3 months ago.  He also has a history of oxygen dependent COPD.  He was brought to the emergency room in [facility] with decreased level of consciousness was found to have a glucose of 27 despite not being on diabetic medications was hypothermic at 35 degrees.  Was brought to the emergency room where he was given glucose and rewarming.  He required rescue BiPAP and was transferred here for ICU admission for rescue positive pressure ventilation.  He was de-escalated to high flow oxygen was treated with empiric antibiotics and steroids.     1/26/2023: Patient seen and evaluated in the ICU.  He was on high flow oxygen this morning and has been tapered down to 3 L.  He is on IV steroids and feels much better on the steroids.  Upon questioning he had been out of the prednisone outpatient for about 6 to 7 days and was found to have a glucose of 27.  Apparently when he is off his prednisone he stops eating.  He does have a prescription for 10 mg tablets of prednisone from Dr. Aguilar his oncologist but had not been able to pick them up and start them yet.    ============================================    On the day discharge patient felt well.  He had no new complaints.  Patient's blood pressure was stable .patient's breathing was stable.  Patient already has home oxygen.  Patient was sent home on a tapering dose of prednisone and maintenance of 10 mg p.o. daily.  Patient to follow-up with PCP for further care and  management    Therefore, he is discharged in good and stable condition to home with close outpatient follow-up.    The patient recovered much more quickly than anticipated on admission.    Discharge Date  1/27    FOLLOW UP ITEMS POST DISCHARGE  PCP as needed    DISCHARGE DIAGNOSES  Principal Problem (Resolved):    Acute respiratory failure with hypoxia (HCC) POA: Yes  Active Problems:    Renal carcinoma (HCC) POA: Yes    Macrocytic anemia POA: Yes    Adrenal insufficiency due to steroid withdrawal (HCC) POA: Yes    Protein-calorie malnutrition, severe (HCC) POA: Yes  Resolved Problems:    Acute exacerbation of chronic obstructive pulmonary disease (COPD) (HCC) POA: Yes    Hypoglycemia POA: Yes    Hypokalemia POA: Yes    Hypomagnesemia POA: Yes      FOLLOW UP  No future appointments.  Michael Sultana M.D.  [address] [place] [id number]  [phone]    Follow up  Please call your primary care provider to schedule a hospital follow up. Thank you.      MEDICATIONS ON DISCHARGE     Medication List        CHANGE how you take these medications        Instructions   predniSONE 10 MG Tabs  Start taking on: January 27, 2023  What changed: See the new instructions.  Commonly known as: DELTASONE   Take 2 Tablets by mouth every day for 5 days, THEN 1 Tablet every day for 60 days. Take one tablet by mouth once daily            CONTINUE taking these medications        Instructions   Inlyta 5 MG Tabs  Generic drug: Axitinib   Take  by mouth 2 times a day.     Keytruda 100 MG/4ML Soln injection  Generic drug: pembrolizumab      levalbuterol 45 MCG/ACT inhaler  Commonly known as: XOPENEX HFA   Inhale 1-2 Puffs every morning as needed for Shortness of Breath.  Dose: 1-2 Puff     levETIRAcetam 500 MG Tabs  Commonly known as: KEPPRA   Take 1 Tablet by mouth 2 times a day.  Dose: 500 mg     loratadine 10 MG Tabs  Commonly known as: CLARITIN      meloxicam 7.5 MG Tabs  Commonly known as: MOBIC   Take 7.5 mg by mouth every  day. Take one tablet by mouth as needed for breakthrough pain  Indications: Breakthrough pain  Dose: 7.5 mg     nicotine polacrilex 4 MG gum  Commonly known as: NICORETTE   Take 4 mg by mouth as needed.  Dose: 4 mg     sertraline 100 MG Tabs  Commonly known as: Zoloft   Take 100 mg by mouth every day.  Dose: 100 mg              Allergies  No Known Allergies    DIET  Orders Placed This Encounter   Procedures    Diet Order Diet: Regular     Standing Status:   Standing     Number of Occurrences:   1     Order Specific Question:   Diet:     Answer:   Regular [1]       ACTIVITY  As tolerated.  Weight bearing as tolerated    CONSULTATIONS    Dr. Danielle intensivist  PROCEDURES      LABORATORY  Lab Results   Component Value Date    SODIUM 134 (L) 01/26/2023    POTASSIUM 4.4 01/26/2023    CHLORIDE 99 01/26/2023    CO2 24 01/26/2023    GLUCOSE 158 (H) 01/26/2023    BUN 7 (L) 01/26/2023    CREATININE 0.54 01/26/2023        Lab Results   Component Value Date    WBC 7.4 01/26/2023    HEMOGLOBIN 11.2 (L) 01/26/2023    HEMATOCRIT 33.2 (L) 01/26/2023    PLATELETCT 202 01/26/2023        Total time of the discharge process exceeds 38  minutes.

## 2023-01-27 NOTE — DIETARY
Nutrition Services Brief Update:    Day 3 of admit.  Zachery Heath is a 57 y.o. male with admitting DX of Respiratory failure.    Current Diet: Regular diet with supplements. Very limited documentation of meals in chart <25% x1 recorded meal. Met with pt at bedside. Per pt he has had an adequate intake and appetite and notes drinking % of 3 meals per day. RD continued to encourage intake of all meals and supplements     Problem: Nutritional:  Goal: Achieve adequate nutritional intake  Description: Patient will consume >50% of meals  Outcome: Progressing     RD following

## 2023-02-01 ENCOUNTER — TELEPHONE (OUTPATIENT)
Dept: RESPIRATORY THERAPY | Facility: MEDICAL CENTER | Age: 58
End: 2023-02-01
Payer: MEDICAID

## 2023-02-01 NOTE — TELEPHONE ENCOUNTER
COPD Program Discharge follow up phone call:    Do you remember your education with Isabella ?  Do you have the Action Plan she filled out for you? Yes  Did you refill your medications? Yes, needs maintenance inhaler and is going to ask for that.  Did you take them every day as prescribed? Yes  Do you have any questions regarding your medications? No  Have you seen your PCP since discharging from the hospital? Seeing his PCP tomorrow, 2/2. He is asking for a referral to pulmonary while there.  Did you stop smoking because of the program? No  Not smoking at time of follow up? No

## 2023-02-09 NOTE — DOCUMENTATION QUERY
"                                                                         [facility]                                                                       Query Response Note      PATIENT:               HELEN HAMMOND  ACCT #:                  [id number]  MRN:                     [id number]  :                      1965  ADMIT DATE:       2023 6:38 AM  DISCH DATE:        2023 12:32 PM  RESPONDING  PROVIDER #:        [id number]           QUERY TEXT:    There is conflicting documentation in the medical record.       \"Protein-calorie malnutrition, severe is documented\"  by Dr. Lawrence.      \"Recent malnutrition,  but he reports good PO intake and recent weight gain\" is documented  by RD Consult.    Based on treatment, clinical findings and risk factors, can this documentation be further clarified?    Note: If you agree with a diagnosis listed above, please remember to include it in your concurrent daily documentation and onto the Discharge Summary.      The patient's Clinical Indicators include:  57 year old male admitted for a COPD exacerbation.     D/C Summary Howard \"Protein-calorie malnutrition, severe (HCC) POA: Yes\"     Rodney LAW \"Malnutrition Risk: Pt likely with recent malnutrition, but he reports good PO intake and recent weight gain.\"  \" Pt states he lost weight to 88 lb but has been regaining weight. He states he eats well when on his prednisone. Appetite currently poor. He states he is trying to eat as much as he can. He does drink vanilla boost at home and agreed to this with all meals.   Observed moderate muscle loss at temples.\"  BMI 18.17    Risk factors: COPD, recent malnutrition, hx of renal cancer with mets   Treatment: labs RD consult, supplements    If you have any questions please contact:  Francisca Miles RN CDI [facility]  [email or web address]  Francisca Miles [address]  Options provided:   -- Severe protein-calorie malnutrition is a valid diagnosis " treated this admission   -- Severe protein-calorie malnutrition is a recent diagnosis but not currently being treated   -- Severe protein-calorie malnutrition is ruled out   -- Other explanation, please specify   -- Unable to determine      Query created by: Francisca Miles on 2/9/2023 5:40 AM    RESPONSE TEXT:    Severe protein-calorie malnutrition is a valid diagnosis treated this admission          Electronically signed by:  KIKO BARDALES MD 2/9/2023 9:19 AM

## 2023-02-28 ENCOUNTER — TELEPHONE (OUTPATIENT)
Dept: RESPIRATORY THERAPY | Facility: MEDICAL CENTER | Age: 58
End: 2023-02-28
Payer: MEDICAID

## (undated) DEVICE — DRAPE LARGE 3 QUARTER - (20/CA)

## (undated) DEVICE — SENSOR SPO2 NEO LNCS ADHESIVE (20/BX) SEE USER NOTES

## (undated) DEVICE — CLIP MED LG INTNL HRZN TI ESCP - (20/BX)

## (undated) DEVICE — SET LEADWIRE 5 LEAD BEDSIDE DISPOSABLE ECG (1SET OF 5/EA)

## (undated) DEVICE — SUTURE 3-0 VICRYL PLUS SH - 8X 18 INCH (12/BX)

## (undated) DEVICE — GLOVE BIOGEL SZ 7.5 SURGICAL PF LTX - (50PR/BX 4BX/CA)

## (undated) DEVICE — GOWN SURGEONS X-LARGE - DISP. (30/CA)

## (undated) DEVICE — LACTATED RINGERS INJ 1000 ML - (14EA/CA 60CA/PF)

## (undated) DEVICE — TUBE CONNECT SUCTION CLEAR 120 X 1/4" (50EA/CA)"

## (undated) DEVICE — SPONGE RADIOPAQUE CTN X-LG - STERILE (50PK/CA) MADE TO ORDER ITEM AND HAS A 4-6 WEEK LEAD TIME

## (undated) DEVICE — TUBE E-T HI-LO CUFF 7.5MM (10EA/PK)

## (undated) DEVICE — CANISTER SUCTION 3000ML MECHANICAL FILTER AUTO SHUTOFF MEDI-VAC NONSTERILE LF DISP  (40EA/CA)

## (undated) DEVICE — ELECTRODE 850 FOAM ADHESIVE - HYDROGEL RADIOTRNSPRNT (50/PK)

## (undated) DEVICE — TRAY ANESTHESIA - CONTINUOUS EPIDURAL (10EA/CA) THIS IS A CUSTOM PACK

## (undated) DEVICE — BLADE SURGICAL #15 - (50/BX 3BX/CA)

## (undated) DEVICE — SUCTION INSTRUMENT YANKAUER BULBOUS TIP W/O VENT (50EA/CA)

## (undated) DEVICE — TUBING CLEARLINK DUO-VENT - C-FLO (48EA/CA)

## (undated) DEVICE — BOVIE  BLADE 6 EXTENDED - (50/PK)

## (undated) DEVICE — PROTECTOR ULNA NERVE - (36PR/CA)

## (undated) DEVICE — CLIP MED INTNL HRZN TI ESCP - (25/BX)

## (undated) DEVICE — SUTURE 1 VICRYL PLUS CT-1 - 18 INCH (12/BX)

## (undated) DEVICE — STAPLER SKIN DISP - (6/BX 10BX/CA) VISISTAT

## (undated) DEVICE — SUTURE 2-0 COATED VICRYL PLUS - 12 X 18 INCH (12/BX)

## (undated) DEVICE — BLADE SURGICAL CLIPPER - (50EA/CA)

## (undated) DEVICE — NEPTUNE 4 PORT MANIFOLD - (20/PK)

## (undated) DEVICE — SLEEVE, VASO, THIGH, MED

## (undated) DEVICE — SHEET TRANSVERSE LAP - (12EA/CA)

## (undated) DEVICE — GOWN WARMING STANDARD FLEX - (30/CA)

## (undated) DEVICE — SODIUM CHL IRRIGATION 0.9% 1000ML (12EA/CA)

## (undated) DEVICE — KIT ROOM DECONTAMINATION

## (undated) DEVICE — DRAPE MAYO STAND - (30/CA)

## (undated) DEVICE — VESSELOOP MAXI BLUE STERILE- SURG-I-LOOP (10EA/BX)

## (undated) DEVICE — SUTURE 0 VICRYL PLUS CT-1 - 8 X 18 INCH (12/BX)

## (undated) DEVICE — SUTURE 3-0 SILK 12 X 18 IN - (36/BX)

## (undated) DEVICE — DRAPE MAGNETIC (INSTRA-MAG) - (30/CA)

## (undated) DEVICE — SPONGE GAUZESTER 4 X 4 4PLY - (128PK/CA)

## (undated) DEVICE — SUTURE 1 SILK 2 X 60 (36PK/BX)

## (undated) DEVICE — CLIP LG INTNL HRZN TI ESCP LGT - (20/BX)

## (undated) DEVICE — CHLORAPREP 26 ML APPLICATOR - ORANGE TINT(25/CA)

## (undated) DEVICE — PAD LAP STERILE 18 X 18 - (5/PK 40PK/CA)

## (undated) DEVICE — TOWELS CLOTH SURGICAL - (4/PK 20PK/CA)

## (undated) DEVICE — HEAD HOLDER JUNIOR/ADULT

## (undated) DEVICE — MASK ANESTHESIA ADULT  - (100/CA)

## (undated) DEVICE — ELECTRODE DUAL RETURN W/ CORD - (50/PK)

## (undated) DEVICE — SUTURE GENERAL

## (undated) DEVICE — SOD. CHL. INJ. 0.9% 1000 ML - (14EA/CA 60CA/PF)

## (undated) DEVICE — PACK MAJOR BASIN - (2EA/CA)

## (undated) DEVICE — BLANKET WARMING LOWER BODY - (10/CA) INACTIVE USE #8585

## (undated) DEVICE — EVICEL 5ML - (1/BX)REFRIGERATE UPON RECEIPT

## (undated) DEVICE — SPONGE DRAIN 4 X 4IN 6-PLY - (2/PK25PK/BX12BX/CS)

## (undated) DEVICE — DRAPE MEDI-SLUSH STERILE  - (40/CA)

## (undated) DEVICE — SUTURE 4-0 PROLENE SH 36 (36PK/BX)"

## (undated) DEVICE — SUTURE 1 VICRYL PLUSCT-1 27IN - (36/BX)

## (undated) DEVICE — TRAY SURESTEP FOLEY TEMP SENSING 16FR (10EA/CA) ORDER  #18764 FOR TEMP FOLEY ONLY

## (undated) DEVICE — KIT ANESTHESIA W/CIRCUIT & 3/LT BAG W/FILTER (20EA/CA)

## (undated) DEVICE — LIGASURE TISSUE FUSION  - SINGLE USE (6/CA)

## (undated) DEVICE — WATER IRRIGATION STERILE 1000ML (12EA/CA)

## (undated) DEVICE — SYRINGE TOOMEY (50EA/CA)

## (undated) DEVICE — SPONGE XRAY 8X4 STERL. 12PL - (10EA/TY 80TY/CA)

## (undated) DEVICE — SET EXTENSION WITH 2 PORTS (48EA/CA) ***PART #2C8610 IS A SUBSTITUTE*****

## (undated) DEVICE — SPONGE PEANUT - (5/PK 50PK/CA)